# Patient Record
Sex: FEMALE | Race: BLACK OR AFRICAN AMERICAN | NOT HISPANIC OR LATINO | URBAN - METROPOLITAN AREA
[De-identification: names, ages, dates, MRNs, and addresses within clinical notes are randomized per-mention and may not be internally consistent; named-entity substitution may affect disease eponyms.]

---

## 2018-03-30 VITALS
HEIGHT: 63 IN | OXYGEN SATURATION: 100 % | DIASTOLIC BLOOD PRESSURE: 63 MMHG | SYSTOLIC BLOOD PRESSURE: 133 MMHG | TEMPERATURE: 97 F | HEART RATE: 56 BPM | RESPIRATION RATE: 16 BRPM | WEIGHT: 169.98 LBS

## 2018-03-30 RX ORDER — CHLORHEXIDINE GLUCONATE 213 G/1000ML
1 SOLUTION TOPICAL ONCE
Qty: 0 | Refills: 0 | Status: COMPLETED | OUTPATIENT
Start: 2018-04-03 | End: 2018-04-04

## 2018-03-30 NOTE — H&P ADULT - FAMILY HISTORY
Father  Still living? Unknown  Family history of coronary artery disease, Age at diagnosis: Age Unknown     Mother  Still living? Unknown  Family history of coronary artery disease, Age at diagnosis: Age Unknown     Sibling  Still living? Unknown  Family history of coronary artery disease, Age at diagnosis: Age Unknown     Grandparent  Still living? Unknown  Family history of coronary artery disease, Age at diagnosis: Age Unknown

## 2018-03-30 NOTE — H&P ADULT - PSH
History of percutaneous coronary intervention H/O shoulder surgery    History of percutaneous coronary intervention

## 2018-03-30 NOTE — H&P ADULT - NSHPLABSRESULTS_GEN_ALL_CORE
12.2   4.8   )-----------( 264      ( 03 Apr 2018 12:00 )             37.4   PT/INR - ( 03 Apr 2018 12:00 )   PT: 13.2 sec;   INR: 1.19          PTT - ( 03 Apr 2018 12:00 )  PTT:33.6 sec    EKG: Sinus bradycardia @ 55 BPM with Q waves in III, no ST-T wave abnormalities noted 12.2   4.8   )-----------( 264      ( 03 Apr 2018 12:00 )             37.4   PT/INR - ( 03 Apr 2018 12:00 )   PT: 13.2 sec;   INR: 1.19          PTT - ( 03 Apr 2018 12:00 )  PTT:33.6 sec    04-03    139  |  102  |  15  ----------------------------<  102<H>  4.2   |  26  |  0.89    Ca    9.8      03 Apr 2018 12:00        EKG: Sinus bradycardia @ 55 BPM with Q waves in III, no ST-T wave abnormalities noted

## 2018-03-30 NOTE — H&P ADULT - ASSESSMENT
Patient is a 66 y/o female strong FHX of CAD w/ PMHx of HTN, Hyperlipidemia, known CAD s/p PCI CHENG mLAD (99%) 7/16/13 at [facility] who presents for cardiac catheterization w/ possible intervention if clinically indicated to r/o progression of CAD in light of pts risk factors, known CAD, CCS III anginal symptoms, and abnormal stress test.    ASA: III   Mallampati: IV    Precath/Consented  IVF NS @ 75 cc/hr  Pt pre-loaded at home with ASA 81 mg PO x 1 and Plavix 75 mg PO x 1 this AM. Pt reports strict compliance with ASA/Plavix regiment.    Risks & benefits of procedure and alternative therapy have been explained to the patient including but not limited to: allergic reaction, bleeding w/possible need for blood transfusion, infection, renal and vascular compromise, limb damage, arrhythmia, stroke, vessel dissection/perforation, Myocardial infarction, emergent CABG. Informed consent obtained and in chart

## 2018-04-03 ENCOUNTER — INPATIENT (INPATIENT)
Facility: HOSPITAL | Age: 68
LOS: 8 days | Discharge: HOME HEALTH | DRG: 234 | End: 2018-04-12
Attending: THORACIC SURGERY (CARDIOTHORACIC VASCULAR SURGERY) | Admitting: THORACIC SURGERY (CARDIOTHORACIC VASCULAR SURGERY)
Payer: COMMERCIAL

## 2018-04-03 DIAGNOSIS — Z98.890 OTHER SPECIFIED POSTPROCEDURAL STATES: Chronic | ICD-10-CM

## 2018-04-03 DIAGNOSIS — Z29.9 ENCOUNTER FOR PROPHYLACTIC MEASURES, UNSPECIFIED: ICD-10-CM

## 2018-04-03 DIAGNOSIS — I20.9 ANGINA PECTORIS, UNSPECIFIED: ICD-10-CM

## 2018-04-03 DIAGNOSIS — R63.8 OTHER SYMPTOMS AND SIGNS CONCERNING FOOD AND FLUID INTAKE: ICD-10-CM

## 2018-04-03 DIAGNOSIS — E78.5 HYPERLIPIDEMIA, UNSPECIFIED: ICD-10-CM

## 2018-04-03 DIAGNOSIS — R73.03 PREDIABETES: ICD-10-CM

## 2018-04-03 DIAGNOSIS — I25.10 ATHEROSCLEROTIC HEART DISEASE OF NATIVE CORONARY ARTERY WITHOUT ANGINA PECTORIS: ICD-10-CM

## 2018-04-03 DIAGNOSIS — I10 ESSENTIAL (PRIMARY) HYPERTENSION: ICD-10-CM

## 2018-04-03 LAB
ALBUMIN SERPL ELPH-MCNC: 4.3 G/DL — SIGNIFICANT CHANGE UP (ref 3.3–5)
ALBUMIN SERPL ELPH-MCNC: 4.5 G/DL — SIGNIFICANT CHANGE UP (ref 3.3–5)
ALP SERPL-CCNC: 61 U/L — SIGNIFICANT CHANGE UP (ref 40–120)
ALP SERPL-CCNC: 69 U/L — SIGNIFICANT CHANGE UP (ref 40–120)
ALT FLD-CCNC: 14 U/L — SIGNIFICANT CHANGE UP (ref 10–45)
ALT FLD-CCNC: 16 U/L — SIGNIFICANT CHANGE UP (ref 10–45)
ANION GAP SERPL CALC-SCNC: 11 MMOL/L — SIGNIFICANT CHANGE UP (ref 5–17)
ANION GAP SERPL CALC-SCNC: 12 MMOL/L — SIGNIFICANT CHANGE UP (ref 5–17)
APPEARANCE UR: CLEAR — SIGNIFICANT CHANGE UP
APTT BLD: 33.6 SEC — SIGNIFICANT CHANGE UP (ref 27.5–37.4)
APTT BLD: 44.3 SEC — HIGH (ref 27.5–37.4)
AST SERPL-CCNC: 38 U/L — SIGNIFICANT CHANGE UP (ref 10–40)
AST SERPL-CCNC: 43 U/L — HIGH (ref 10–40)
BASOPHILS NFR BLD AUTO: 0.6 % — SIGNIFICANT CHANGE UP (ref 0–2)
BILIRUB DIRECT SERPL-MCNC: <0.2 MG/DL — SIGNIFICANT CHANGE UP (ref 0–0.2)
BILIRUB INDIRECT FLD-MCNC: >0.1 MG/DL — LOW (ref 0.2–1)
BILIRUB SERPL-MCNC: 0.3 MG/DL — SIGNIFICANT CHANGE UP (ref 0.2–1.2)
BILIRUB SERPL-MCNC: 0.3 MG/DL — SIGNIFICANT CHANGE UP (ref 0.2–1.2)
BILIRUB UR-MCNC: NEGATIVE — SIGNIFICANT CHANGE UP
BUN SERPL-MCNC: 13 MG/DL — SIGNIFICANT CHANGE UP (ref 7–23)
BUN SERPL-MCNC: 15 MG/DL — SIGNIFICANT CHANGE UP (ref 7–23)
CALCIUM SERPL-MCNC: 9.4 MG/DL — SIGNIFICANT CHANGE UP (ref 8.4–10.5)
CALCIUM SERPL-MCNC: 9.8 MG/DL — SIGNIFICANT CHANGE UP (ref 8.4–10.5)
CHLORIDE SERPL-SCNC: 100 MMOL/L — SIGNIFICANT CHANGE UP (ref 96–108)
CHLORIDE SERPL-SCNC: 102 MMOL/L — SIGNIFICANT CHANGE UP (ref 96–108)
CHOLEST SERPL-MCNC: 199 MG/DL — SIGNIFICANT CHANGE UP (ref 10–199)
CK MB CFR SERPL CALC: 5.6 NG/ML — SIGNIFICANT CHANGE UP (ref 0–6.7)
CK SERPL-CCNC: 345 U/L — HIGH (ref 25–170)
CO2 SERPL-SCNC: 23 MMOL/L — SIGNIFICANT CHANGE UP (ref 22–31)
CO2 SERPL-SCNC: 26 MMOL/L — SIGNIFICANT CHANGE UP (ref 22–31)
COLOR SPEC: YELLOW — SIGNIFICANT CHANGE UP
CREAT SERPL-MCNC: 0.71 MG/DL — SIGNIFICANT CHANGE UP (ref 0.5–1.3)
CREAT SERPL-MCNC: 0.89 MG/DL — SIGNIFICANT CHANGE UP (ref 0.5–1.3)
CRP SERPL-MCNC: 0.76 MG/DL — HIGH (ref 0–0.4)
DIFF PNL FLD: NEGATIVE — SIGNIFICANT CHANGE UP
EOSINOPHIL NFR BLD AUTO: 7.3 % — HIGH (ref 0–6)
GLUCOSE SERPL-MCNC: 102 MG/DL — HIGH (ref 70–99)
GLUCOSE SERPL-MCNC: 90 MG/DL — SIGNIFICANT CHANGE UP (ref 70–99)
GLUCOSE UR QL: NEGATIVE — SIGNIFICANT CHANGE UP
HBA1C BLD-MCNC: 5.9 % — HIGH (ref 4–5.6)
HCT VFR BLD CALC: 37.4 % — SIGNIFICANT CHANGE UP (ref 34.5–45)
HCT VFR BLD CALC: 38.3 % — SIGNIFICANT CHANGE UP (ref 34.5–45)
HDLC SERPL-MCNC: 62 MG/DL — SIGNIFICANT CHANGE UP (ref 40–125)
HGB BLD-MCNC: 12.2 G/DL — SIGNIFICANT CHANGE UP (ref 11.5–15.5)
HGB BLD-MCNC: 12.4 G/DL — SIGNIFICANT CHANGE UP (ref 11.5–15.5)
INR BLD: 1.19 — HIGH (ref 0.88–1.16)
INR BLD: 1.25 — HIGH (ref 0.88–1.16)
KETONES UR-MCNC: NEGATIVE — SIGNIFICANT CHANGE UP
LEUKOCYTE ESTERASE UR-ACNC: NEGATIVE — SIGNIFICANT CHANGE UP
LIPID PNL WITH DIRECT LDL SERPL: 122 MG/DL — SIGNIFICANT CHANGE UP
LYMPHOCYTES # BLD AUTO: 49.4 % — HIGH (ref 13–44)
MAGNESIUM SERPL-MCNC: 2 MG/DL — SIGNIFICANT CHANGE UP (ref 1.6–2.6)
MCHC RBC-ENTMCNC: 26.6 PG — LOW (ref 27–34)
MCHC RBC-ENTMCNC: 27 PG — SIGNIFICANT CHANGE UP (ref 27–34)
MCHC RBC-ENTMCNC: 32.4 G/DL — SIGNIFICANT CHANGE UP (ref 32–36)
MCHC RBC-ENTMCNC: 32.6 G/DL — SIGNIFICANT CHANGE UP (ref 32–36)
MCV RBC AUTO: 82 FL — SIGNIFICANT CHANGE UP (ref 80–100)
MCV RBC AUTO: 82.7 FL — SIGNIFICANT CHANGE UP (ref 80–100)
MONOCYTES NFR BLD AUTO: 7.5 % — SIGNIFICANT CHANGE UP (ref 2–14)
NEUTROPHILS NFR BLD AUTO: 35.2 % — LOW (ref 43–77)
NITRITE UR-MCNC: NEGATIVE — SIGNIFICANT CHANGE UP
NT-PROBNP SERPL-SCNC: 62 PG/ML — SIGNIFICANT CHANGE UP (ref 0–300)
PH UR: 7 — SIGNIFICANT CHANGE UP (ref 5–8)
PLATELET # BLD AUTO: 264 K/UL — SIGNIFICANT CHANGE UP (ref 150–400)
PLATELET # BLD AUTO: 279 K/UL — SIGNIFICANT CHANGE UP (ref 150–400)
POTASSIUM SERPL-MCNC: 4.2 MMOL/L — SIGNIFICANT CHANGE UP (ref 3.5–5.3)
POTASSIUM SERPL-MCNC: SIGNIFICANT CHANGE UP MMOL/L (ref 3.5–5.3)
POTASSIUM SERPL-SCNC: 4.2 MMOL/L — SIGNIFICANT CHANGE UP (ref 3.5–5.3)
POTASSIUM SERPL-SCNC: SIGNIFICANT CHANGE UP MMOL/L (ref 3.5–5.3)
PROT SERPL-MCNC: 7.9 G/DL — SIGNIFICANT CHANGE UP (ref 6–8.3)
PROT SERPL-MCNC: 8 G/DL — SIGNIFICANT CHANGE UP (ref 6–8.3)
PROT UR-MCNC: NEGATIVE MG/DL — SIGNIFICANT CHANGE UP
PROTHROM AB SERPL-ACNC: 13.2 SEC — HIGH (ref 9.8–12.7)
PROTHROM AB SERPL-ACNC: 13.9 SEC — HIGH (ref 9.8–12.7)
RBC # BLD: 4.52 M/UL — SIGNIFICANT CHANGE UP (ref 3.8–5.2)
RBC # BLD: 4.67 M/UL — SIGNIFICANT CHANGE UP (ref 3.8–5.2)
RBC # FLD: 13.4 % — SIGNIFICANT CHANGE UP (ref 10.3–16.9)
RBC # FLD: 13.4 % — SIGNIFICANT CHANGE UP (ref 10.3–16.9)
SODIUM SERPL-SCNC: 135 MMOL/L — SIGNIFICANT CHANGE UP (ref 135–145)
SODIUM SERPL-SCNC: 139 MMOL/L — SIGNIFICANT CHANGE UP (ref 135–145)
SP GR SPEC: 1.01 — SIGNIFICANT CHANGE UP (ref 1–1.03)
TOTAL CHOLESTEROL/HDL RATIO MEASUREMENT: 3.2 RATIO — LOW (ref 3.3–7.1)
TRIGL SERPL-MCNC: 77 MG/DL — SIGNIFICANT CHANGE UP (ref 10–149)
TROPONIN T SERPL-MCNC: <0.01 NG/ML — SIGNIFICANT CHANGE UP (ref 0–0.01)
TSH SERPL-MCNC: 0.9 UIU/ML — SIGNIFICANT CHANGE UP (ref 0.35–4.94)
UROBILINOGEN FLD QL: 0.2 E.U./DL — SIGNIFICANT CHANGE UP
WBC # BLD: 3.9 K/UL — SIGNIFICANT CHANGE UP (ref 3.8–10.5)
WBC # BLD: 4.8 K/UL — SIGNIFICANT CHANGE UP (ref 3.8–10.5)
WBC # FLD AUTO: 3.9 K/UL — SIGNIFICANT CHANGE UP (ref 3.8–10.5)
WBC # FLD AUTO: 4.8 K/UL — SIGNIFICANT CHANGE UP (ref 3.8–10.5)

## 2018-04-03 PROCEDURE — 93306 TTE W/DOPPLER COMPLETE: CPT | Mod: 26

## 2018-04-03 PROCEDURE — 93571 IV DOP VEL&/PRESS C FLO 1ST: CPT | Mod: 26,LM

## 2018-04-03 PROCEDURE — 93010 ELECTROCARDIOGRAM REPORT: CPT

## 2018-04-03 PROCEDURE — 71045 X-RAY EXAM CHEST 1 VIEW: CPT | Mod: 26

## 2018-04-03 PROCEDURE — 99223 1ST HOSP IP/OBS HIGH 75: CPT

## 2018-04-03 PROCEDURE — 93458 L HRT ARTERY/VENTRICLE ANGIO: CPT | Mod: 26

## 2018-04-03 RX ORDER — NITROGLYCERIN 6.5 MG
10 CAPSULE, EXTENDED RELEASE ORAL
Qty: 50 | Refills: 0 | Status: DISCONTINUED | OUTPATIENT
Start: 2018-04-03 | End: 2018-04-03

## 2018-04-03 RX ORDER — ASPIRIN/CALCIUM CARB/MAGNESIUM 324 MG
81 TABLET ORAL DAILY
Qty: 0 | Refills: 0 | Status: DISCONTINUED | OUTPATIENT
Start: 2018-04-03 | End: 2018-04-05

## 2018-04-03 RX ORDER — METOPROLOL TARTRATE 50 MG
50 TABLET ORAL DAILY
Qty: 0 | Refills: 0 | Status: DISCONTINUED | OUTPATIENT
Start: 2018-04-03 | End: 2018-04-05

## 2018-04-03 RX ORDER — CLOPIDOGREL BISULFATE 75 MG/1
75 TABLET, FILM COATED ORAL DAILY
Qty: 0 | Refills: 0 | Status: DISCONTINUED | OUTPATIENT
Start: 2018-04-03 | End: 2018-04-03

## 2018-04-03 RX ORDER — ATORVASTATIN CALCIUM 80 MG/1
80 TABLET, FILM COATED ORAL AT BEDTIME
Qty: 0 | Refills: 0 | Status: DISCONTINUED | OUTPATIENT
Start: 2018-04-03 | End: 2018-04-05

## 2018-04-03 RX ORDER — SODIUM CHLORIDE 9 MG/ML
500 INJECTION INTRAMUSCULAR; INTRAVENOUS; SUBCUTANEOUS
Qty: 0 | Refills: 0 | Status: DISCONTINUED | OUTPATIENT
Start: 2018-04-03 | End: 2018-04-03

## 2018-04-03 RX ORDER — AMLODIPINE BESYLATE 2.5 MG/1
2.5 TABLET ORAL DAILY
Qty: 0 | Refills: 0 | Status: DISCONTINUED | OUTPATIENT
Start: 2018-04-03 | End: 2018-04-03

## 2018-04-03 RX ORDER — MORPHINE SULFATE 50 MG/1
2 CAPSULE, EXTENDED RELEASE ORAL ONCE
Qty: 0 | Refills: 0 | Status: DISCONTINUED | OUTPATIENT
Start: 2018-04-03 | End: 2018-04-03

## 2018-04-03 RX ORDER — HEPARIN SODIUM 5000 [USP'U]/ML
950 INJECTION INTRAVENOUS; SUBCUTANEOUS
Qty: 25000 | Refills: 0 | Status: DISCONTINUED | OUTPATIENT
Start: 2018-04-03 | End: 2018-04-04

## 2018-04-03 RX ORDER — ISOSORBIDE MONONITRATE 60 MG/1
120 TABLET, EXTENDED RELEASE ORAL DAILY
Qty: 0 | Refills: 0 | Status: DISCONTINUED | OUTPATIENT
Start: 2018-04-03 | End: 2018-04-05

## 2018-04-03 RX ORDER — LOSARTAN POTASSIUM 100 MG/1
50 TABLET, FILM COATED ORAL DAILY
Qty: 0 | Refills: 0 | Status: DISCONTINUED | OUTPATIENT
Start: 2018-04-03 | End: 2018-04-03

## 2018-04-03 RX ORDER — NITROGLYCERIN 6.5 MG
0.4 CAPSULE, EXTENDED RELEASE ORAL ONCE
Qty: 0 | Refills: 0 | Status: DISCONTINUED | OUTPATIENT
Start: 2018-04-03 | End: 2018-04-03

## 2018-04-03 RX ORDER — RANOLAZINE 500 MG/1
500 TABLET, FILM COATED, EXTENDED RELEASE ORAL
Qty: 0 | Refills: 0 | Status: DISCONTINUED | OUTPATIENT
Start: 2018-04-03 | End: 2018-04-05

## 2018-04-03 RX ORDER — FAMOTIDINE 10 MG/ML
40 INJECTION INTRAVENOUS ONCE
Qty: 0 | Refills: 0 | Status: COMPLETED | OUTPATIENT
Start: 2018-04-03 | End: 2018-04-03

## 2018-04-03 RX ADMIN — ATORVASTATIN CALCIUM 80 MILLIGRAM(S): 80 TABLET, FILM COATED ORAL at 22:22

## 2018-04-03 RX ADMIN — Medication 30 MILLILITER(S): at 18:09

## 2018-04-03 RX ADMIN — FAMOTIDINE 40 MILLIGRAM(S): 10 INJECTION INTRAVENOUS at 18:09

## 2018-04-03 RX ADMIN — SODIUM CHLORIDE 75 MILLILITER(S): 9 INJECTION INTRAMUSCULAR; INTRAVENOUS; SUBCUTANEOUS at 12:22

## 2018-04-03 RX ADMIN — MORPHINE SULFATE 2 MILLIGRAM(S): 50 CAPSULE, EXTENDED RELEASE ORAL at 18:31

## 2018-04-03 RX ADMIN — RANOLAZINE 500 MILLIGRAM(S): 500 TABLET, FILM COATED, EXTENDED RELEASE ORAL at 18:09

## 2018-04-03 RX ADMIN — HEPARIN SODIUM 9.5 UNIT(S)/HR: 5000 INJECTION INTRAVENOUS; SUBCUTANEOUS at 22:23

## 2018-04-03 RX ADMIN — MORPHINE SULFATE 2 MILLIGRAM(S): 50 CAPSULE, EXTENDED RELEASE ORAL at 18:09

## 2018-04-03 NOTE — PROGRESS NOTE ADULT - PROBLEM SELECTOR PLAN 1
Patient with h/o CAD s/p CHENG to mLAD in 2013 now with one year history of progressively worsening exertional angina, who is now found on cardiac catheterization.  Findings were LM 70%, mLAD 50%, oLCx 70%, RCA 50% EF 60%.  -- Dr. Murrieta of CT surgery was consulted for possible surgical intervention and hybrid procedure.  Troponins negative, EKG without ischemic changes.    -- Holding home Plavix in setting of possible intervention  -- Heparin gtt for AC  -- C/w metoprolol succinate 50 mg PO daily  -- Initiate atorvastatin 80 mg PO daily  -- Hold home losartan 50 mg PO daily in setting of cath and planned procedure

## 2018-04-03 NOTE — PROGRESS NOTE ADULT - PROBLEM SELECTOR PLAN 2
Patient experiencing chest pain likely anginal in nature.    -- C/w morphine 2 mg IV PRN for chest pain  -- Management for CAD as above

## 2018-04-03 NOTE — PROGRESS NOTE ADULT - ASSESSMENT
Ms. Snell is a 67 year-old female with a PMH of CAD (s/p CHENG to mLAD in 2013), HTN, HLD, and FH significant for CAD, who presented to her cardiologist for exertional chest pain, underwent cardiac catheterization and was found to have multi-vessel disease, planned for possible intervention, being monitored in CCU.

## 2018-04-03 NOTE — PROGRESS NOTE ADULT - PROBLEM SELECTOR PLAN 7
#DVT PPX - on heparin gtt  #Code - full  #Dispo - CCU for monitoring after cath and in advance of planned procedure

## 2018-04-03 NOTE — PROGRESS NOTE ADULT - SUBJECTIVE AND OBJECTIVE BOX
Event Note:    Pt endorsing 4/10 chest burning post cath. EKG nonischemic and unchanged from prior. Dr. Cortes recommended Nitro drip titrated to pt's comfort. Nitro drip at 3ml/hr initiated and pt's SBP went from 150s to 110s and Nitro drip discontinued. Pt stated chest pain is around 2/10 and pt is resting comfortably with no associated symptoms.

## 2018-04-03 NOTE — CONSULT NOTE ADULT - SUBJECTIVE AND OBJECTIVE BOX
Surgeon: Dr. Murrieta     Requesting Physician: Dr. Cortes     HISTORY OF PRESENT ILLNESS:  67 year old female, with strong FHx CAD and PMHx HTN, HLD, known CAD s/p PCI CHENG mLAD 13, possible hx of TIA presented to her cardiologist Dr. Cortes complaining of exertional chest pain after ambulating less than 1/2 block. Parient decscribes the pain as burning and pressure radiating to her bilateral arms and left jaw. Patient states her pain does not resolve with rest and mostly occurs when she ambulates 3-4 stairs. Also states that she experiences palpitations and shortness of breath with the chest pain but denies any diaphoresis, nausea or vomiting. She also complains of increasing LE edema and 2 pillow orthopnea that has been progressively getting worse. Patient subsequently underwent abnormal NST and was referred for elective cardiac cath today. Cardiac cath revealed LM 70%, mLAD 50%, oLCx 70%, RCA 50% EF 60%. CT Surgery Dr. Murrieta consulted for possible surgical intervention and hybrid procedure.     PAST MEDICAL & SURGICAL HISTORY:  Coronary artery disease  Hyperlipidemia  Hypertension  H/O shoulder surgery  History of percutaneous coronary intervention      MEDICATIONS  (STANDING):  amLODIPine   Tablet 2.5 milliGRAM(s) Oral daily  aspirin enteric coated 81 milliGRAM(s) Oral daily  chlorhexidine 4% Liquid 1 Application(s) Topical once  clopidogrel Tablet 75 milliGRAM(s) Oral daily  isosorbide   mononitrate ER Tablet (IMDUR) 120 milliGRAM(s) Oral daily  losartan 50 milliGRAM(s) Oral daily  metoprolol succinate ER 50 milliGRAM(s) Oral daily  nitroglycerin  Infusion 10 MICROgram(s)/Min (3 mL/Hr) IV Continuous <Continuous>  ranolazine 500 milliGRAM(s) Oral two times a day  sodium chloride 0.9%. 500 milliLiter(s) (75 mL/Hr) IV Continuous <Continuous>    MEDICATIONS  (PRN):      Allergies    No Known Allergies    Intolerances        SOCIAL HISTORY:  Smoker: No  ETOH use: No  Ilicit Drug use: No  Occupation: OT assistant   Assisted device use (Cane / Walker): None     FAMILY HISTORY:  Family history of coronary artery disease (Father, Mother, Sibling, Grandparent)    Review of Systems  CONSTITUTIONAL:  Denies Fevers / chills, sweats, fatigue, weight loss, weight gain                                      NEURO:  Denies parathesias, seizures, syncope, confusion                                                                                EYES:  Denies Blurry vision, discharge, pain, loss of vision                                                                                    ENMT:  Denies Difficulty hearing, vertigo, dysphagia, epistaxis, recent dental work                                       CV:  Denies Chest pain, palpitations, SANTOS, orthopnea                                                                                          RESPIRATORY:  Denies Wheezing, SOB, cough / sputum, hemoptysis                                                                GI:  Denies Nausea, vommiting, diarrhea, constipation, melena, difficulty swallowing                                               : Denies Hematuria, dysuria, urgency, incontinence                                                                                         MUSKULOSKELETAL:  Denies arthritis, joint swelling, muscle weakness                                                             SKIN/BREAST:  Denies rash, itching, kristie loss, masses                                                                                            PSYCH:  Denies depresion, anxiety, suicidal ideation                                                                                               HEME/LYMPH:  Denies bruises easily, enlarged lymph nodes, tender lymph nodes                                        ENDOCRINE:  Denies cold intolerance, heat intolerance, polydipsia       PHYSICAL EXAM  Vital Signs Last 24 Hrs  T(C): --  T(F): --  HR: --  BP: --  BP(mean): --  RR: --  SpO2: --    General: Patient lying comfortably in bed, no acute distress     Neurological: Alert and oriented. No focal neurological deficits     HEENT: PERRL, no abnormal findings     Cardiovascular: S1S2, RRR, no murmurs appreciated on exam     Respiratory: Clear to ausculation bilaterally     Gastrointestinal: Abdomen soft, non tender, non distended     Extremities: Warm and well perfused. No edema or calf tenderness     Skin/Breast: WNL     Vascular: Peripheral pulses 2+ bilaterally     Incision Sites: R radial cath site C/D/I                                                           LABS:                        12.2   4.8   )-----------( 264      ( 2018 12:00 )             37.4     139  |  102  |  15  ----------------------------<  102<H>  4.2   |  26  |  0.89    Ca    9.8      2018 12:00      PT/INR - ( 2018 12:00 )   PT: 13.2 sec;   INR: 1.19          PTT - ( 2018 12:00 )  PTT:33.6 sec    CARDIAC MARKERS ( 2018 12:00 )  x     / x     / 345 U/L / x     / 5.6 ng/mL        Hemoglobin A1C, Whole Blood: 5.9 % (04-03 @ 12:00)    RADIOLOGY & ADDITIONAL STUDIES:  CAROTID U/S: Pending     CXR: Pending     CT Scan: Pending     EK-03  < from: 12 Lead ECG (18 @ 12:26) >  Sinus bradycardia  Possible Inferior infarct , age undetermined    TTE / MACY: Pending     Cardiac Cath:  LM 70%, mLAD 50%, oLCx 70%, RCA 50% EF 60%.

## 2018-04-03 NOTE — PROGRESS NOTE ADULT - PROBLEM SELECTOR PLAN 3
Patient with h/o HTN  -- holding home losartan 50 mg PO daily in setting of cath and planned procedure  -- holding home amlodipine   -- telemetry monitoring of hemodynamics, consider PRN hydralazine if patient becomes hypertensive.

## 2018-04-03 NOTE — PROGRESS NOTE ADULT - SUBJECTIVE AND OBJECTIVE BOX
CCU TRANSFER ACCEPT NOTE    HOSPITAL COURSE:    SUBJECTIVE / INTERVAL HPI: Patient seen and examined at bedside.     VITAL SIGNS:  Vital Signs Last 24 Hrs  T(C): --  T(F): --  HR: --  BP: --  BP(mean): --  RR: --  SpO2: --    PHYSICAL EXAM:    General: WDWN  HEENT: NC/AT; PERRL, anicteric sclera; MMM  Neck: supple  Cardiovascular: +S1/S2; RRR  Respiratory: CTA B/L; no W/R/R  Gastrointestinal: soft, NT/ND; +BSx4  Extremities: WWP; no edema, clubbing or cyanosis  Vascular: 2+ radial, DP/PT pulses B/L  Neurological: AAOx3; no focal deficits    MEDICATIONS:  MEDICATIONS  (STANDING):  amLODIPine   Tablet 2.5 milliGRAM(s) Oral daily  aspirin enteric coated 81 milliGRAM(s) Oral daily  chlorhexidine 4% Liquid 1 Application(s) Topical once  isosorbide   mononitrate ER Tablet (IMDUR) 120 milliGRAM(s) Oral daily  losartan 50 milliGRAM(s) Oral daily  metoprolol succinate ER 50 milliGRAM(s) Oral daily  ranolazine 500 milliGRAM(s) Oral two times a day  sodium chloride 0.9%. 500 milliLiter(s) (75 mL/Hr) IV Continuous <Continuous>    MEDICATIONS  (PRN):      ALLERGIES:  Allergies    No Known Allergies    Intolerances        LABS:                        12.2   4.8   )-----------( 264      ( 03 Apr 2018 12:00 )             37.4     04-03    139  |  102  |  15  ----------------------------<  102<H>  4.2   |  26  |  0.89    Ca    9.8      03 Apr 2018 12:00    TPro  8.0  /  Alb  4.5  /  TBili  0.3  /  DBili  <0.2  /  AST  38  /  ALT  14  /  AlkPhos  69  04-03    PT/INR - ( 03 Apr 2018 12:00 )   PT: 13.2 sec;   INR: 1.19          PTT - ( 03 Apr 2018 12:00 )  PTT:33.6 sec    CAPILLARY BLOOD GLUCOSE          RADIOLOGY & ADDITIONAL TESTS: Reviewed.    ASSESSMENT:    PLAN: CCU TRANSFER ACCEPT NOTE    HOSPITAL COURSE:  Ms. Snell is a 67 year-old female with a PMH of CAD (s/p CHENG to mLAD in 2013), HTN, HLD, and FH significant for CAD, who presented to her cardiologist, Dr. Cortes, for exertional chest pain.  She was in her usual state of health until _____, when.  She describes the pain as a tightness that radiates to her neck and ear.  When present, it is approximately an 8/10 in severity.  Associated symptoms include palptiations.  She says that rest and deep breathing make the pain better.  She says it is worsened by exertion, and can only walk one block before developing chest pain.  She denies syncope, dizziness, orthopnea, and LE edema.      Of note, the patient underwent a nuclear stress test on 12/21/2017 that demonstrated an EF of 67%, J point depression 0.5 to 1mm in inferolateral leads w/ upsloping ST segment, and normal myocardial perfusion.  However, patient experienced chest pain at peak exercise, which resolved during recovery. The stress test was terminated due to chest pain.  The patient previously underwent cardiac catheterization at [facility] on 7/16/2013 that demonstrated the following: LVEF 65%. No AS. No MR. LM: large sized with minor luminal irregularities. CSA IVUS 7.0 mm2. LAD: large sized, proximal 50% IVUS CSA 5.3 mm2, lesion extends to mid LAD with 4.0 mm2 (proximal to 99 % lesion in mid LAD). Promus CHENG mLAD 99% with residual 0% stenosis. LCx: (dominant), large sized, minor luminal irregularities. RCA: minor luminal irregularities.    In light of pts risk factors, known CAD, CCS III anginal symptoms and abnormal stress test pt is referred for cardiac catheterization w/ possible intervention if clinically indicated to r/o progression of CAD        SUBJECTIVE / INTERVAL HPI: Patient seen and examined at bedside.     VITAL SIGNS:  Vital Signs Last 24 Hrs  T(C): --  T(F): --  HR: --  BP: --  BP(mean): --  RR: --  SpO2: --    PHYSICAL EXAM:    General: WDWN  HEENT: NC/AT; PERRL, anicteric sclera; MMM  Neck: supple  Cardiovascular: +S1/S2; RRR  Respiratory: CTA B/L; no W/R/R  Gastrointestinal: soft, NT/ND; +BSx4  Extremities: WWP; no edema, clubbing or cyanosis  Vascular: 2+ radial, DP/PT pulses B/L  Neurological: AAOx3; no focal deficits    MEDICATIONS:  MEDICATIONS  (STANDING):  amLODIPine   Tablet 2.5 milliGRAM(s) Oral daily  aspirin enteric coated 81 milliGRAM(s) Oral daily  chlorhexidine 4% Liquid 1 Application(s) Topical once  isosorbide   mononitrate ER Tablet (IMDUR) 120 milliGRAM(s) Oral daily  losartan 50 milliGRAM(s) Oral daily  metoprolol succinate ER 50 milliGRAM(s) Oral daily  ranolazine 500 milliGRAM(s) Oral two times a day  sodium chloride 0.9%. 500 milliLiter(s) (75 mL/Hr) IV Continuous <Continuous>    MEDICATIONS  (PRN):      ALLERGIES:  Allergies    No Known Allergies    Intolerances        LABS:                        12.2   4.8   )-----------( 264      ( 03 Apr 2018 12:00 )             37.4     04-03    139  |  102  |  15  ----------------------------<  102<H>  4.2   |  26  |  0.89    Ca    9.8      03 Apr 2018 12:00    TPro  8.0  /  Alb  4.5  /  TBili  0.3  /  DBili  <0.2  /  AST  38  /  ALT  14  /  AlkPhos  69  04-03    PT/INR - ( 03 Apr 2018 12:00 )   PT: 13.2 sec;   INR: 1.19          PTT - ( 03 Apr 2018 12:00 )  PTT:33.6 sec    CAPILLARY BLOOD GLUCOSE          RADIOLOGY & ADDITIONAL TESTS: Reviewed.    ASSESSMENT:    PLAN: CCU TRANSFER ACCEPT NOTE    HOSPITAL COURSE:  Ms. Snell is a 67 year-old female with a PMH of CAD (s/p CHENG to mLAD in 2013), HTN, HLD, and FH significant for CAD, who presented to her cardiologist, Dr. Cortes, for exertional chest pain.  She states that the onset of her symptoms were gradual over the course of the past year.  She states that she decided to seek medical attention because her activity tolerance before developing chest pain has decreased significantly over that period of time.  She describes the pain as a tightness that radiates to her neck and ear.  When present, it is approximately an 8/10 in severity.  Associated symptoms include palpitations  She says that rest and deep breathing make the pain better.  She says it is worsened by exertion, and can only walk one block before developing chest pain.  She denies syncope, dizziness, orthopnea, and LE edema.      Of note, the patient underwent a nuclear stress test on 12/21/2017 that demonstrated an EF of 67%, J point depression 0.5 to 1mm in inferolateral leads w/ upsloping ST segment, and normal myocardial perfusion.  However, patient experienced chest pain at peak exercise, which resolved during recovery. The stress test was terminated due to chest pain.  The patient previously underwent cardiac catheterization at [facility] on 7/16/2013 that demonstrated the following: LVEF 65%. No AS. No MR. LM: large sized with minor luminal irregularities. CSA IVUS 7.0 mm2. LAD: large sized, proximal 50% IVUS CSA 5.3 mm2, lesion extends to mid LAD with 4.0 mm2 (proximal to 99 % lesion in mid LAD). Promus CHENG mLAD 99% with residual 0% stenosis. LCx: (dominant), large sized, minor luminal irregularities. RCA: minor luminal irregularities.    In light of pts risk factors, known CAD, CCS III anginal symptoms and abnormal stress test pt is referred for cardiac catheterization w/ possible intervention if clinically indicated to r/o progression of CAD        SUBJECTIVE / INTERVAL HPI: Patient seen and examined at bedside.     Vital Signs Last 24 Hrs  T(C): 36.2 (03 Apr 2018 17:18), Max: 36.2 (03 Apr 2018 17:18)  T(F): 97.2 (03 Apr 2018 17:18), Max: 97.2 (03 Apr 2018 17:18)  HR: 60 (03 Apr 2018 17:30) (58 - 60)  BP: 138/74 (03 Apr 2018 17:30) (138/61 - 140/65)  BP(mean): 115 (03 Apr 2018 17:30) (8 - 115)  RR: 17 (03 Apr 2018 17:30) (14 - 17)  SpO2: 100% (03 Apr 2018 17:30) (99% - 100%)    PHYSICAL EXAM:    General: WDWN  HEENT: NC/AT; PERRL, anicteric sclera; MMM  Neck: supple  Cardiovascular: +S1/S2; RRR  Respiratory: CTA B/L; no W/R/R  Gastrointestinal: soft, NT/ND; +BSx4  Extremities: WWP; no edema, clubbing or cyanosis  Vascular: 2+ radial, DP/PT pulses B/L  Neurological: AAOx3; no focal deficits    MEDICATIONS:  MEDICATIONS  (STANDING):  amLODIPine   Tablet 2.5 milliGRAM(s) Oral daily  aspirin enteric coated 81 milliGRAM(s) Oral daily  chlorhexidine 4% Liquid 1 Application(s) Topical once  isosorbide   mononitrate ER Tablet (IMDUR) 120 milliGRAM(s) Oral daily  losartan 50 milliGRAM(s) Oral daily  metoprolol succinate ER 50 milliGRAM(s) Oral daily  ranolazine 500 milliGRAM(s) Oral two times a day  sodium chloride 0.9%. 500 milliLiter(s) (75 mL/Hr) IV Continuous <Continuous>    MEDICATIONS  (PRN):      ALLERGIES:  Allergies    No Known Allergies    Intolerances        LABS:                        12.2   4.8   )-----------( 264      ( 03 Apr 2018 12:00 )             37.4     04-03    139  |  102  |  15  ----------------------------<  102<H>  4.2   |  26  |  0.89    Ca    9.8      03 Apr 2018 12:00    TPro  8.0  /  Alb  4.5  /  TBili  0.3  /  DBili  <0.2  /  AST  38  /  ALT  14  /  AlkPhos  69  04-03    PT/INR - ( 03 Apr 2018 12:00 )   PT: 13.2 sec;   INR: 1.19          PTT - ( 03 Apr 2018 12:00 )  PTT:33.6 sec    CAPILLARY BLOOD GLUCOSE          RADIOLOGY & ADDITIONAL TESTS: Reviewed.    ASSESSMENT:    PLAN: CCU TRANSFER ACCEPT NOTE    HOSPITAL COURSE:  Ms. Snell is a 67 year-old female with a PMH of CAD (s/p CHENG to mLAD in 2013), HTN, HLD, and FH significant for CAD, who presented to her cardiologist, Dr. Cortes, for exertional chest pain.  She states that the onset of her symptoms were gradual over the course of the past year.  She states that she decided to seek medical attention because her activity tolerance before developing chest pain has decreased significantly over that period of time.  She describes the pain as a tightness that radiates to her neck and ear.  When present, it is approximately an 8/10 in severity.  Associated symptoms include palpitations  She says that rest and deep breathing make the pain better.  She says it is worsened by exertion, and can only walk one block before developing chest pain.  She denies syncope, dizziness, orthopnea, and LE edema.      Of note, the patient underwent a nuclear stress test on 12/21/2017 that demonstrated an EF of 67%, J point depression 0.5 to 1mm in inferolateral leads w/ upsloping ST segment, and normal myocardial perfusion.  However, patient experienced chest pain at peak exercise, which resolved during recovery. The stress test was terminated due to chest pain.  The patient previously underwent cardiac catheterization at [facility] on 7/16/2013 that demonstrated the following: LVEF 65%. No AS. No MR. LM: large sized with minor luminal irregularities. CSA IVUS 7.0 mm2. LAD: large sized, proximal 50% IVUS CSA 5.3 mm2, lesion extends to mid LAD with 4.0 mm2 (proximal to 99 % lesion in mid LAD). Promus CHENG mLAD 99% with residual 0% stenosis. LCx: (dominant), large sized, minor luminal irregularities. RCA: minor luminal irregularities.    Because of the patient's risk factors, known CAD, angina and abnormal stress test, she was referred for cardiac catheterization.  Cardiac cath revealed LM 70%, mLAD 50%, oLCx 70%, RCA 50% EF 60%.  Dr. Murrieta of CT surgery was consulted for possible surgical intervention and hybrid procedure.      SUBJECTIVE / INTERVAL HPI: Patient seen and examined at bedside upon arrival in CCU.  She reports that she is doing well after her procedure.  She does admit to mild chest pain, 2/10, central and nonradiating, consistent with her known prior episodes of chest pain.  No shortness of breath or distress.  She currently denies palpitations, but admits on ROS that she intermittently does get palpitations, intermittent dizziness and leg pain while walking.  She endorses LE edema worse at the end of the day.  Denies nausea, vomiting, diaphoresis fever, chills, diarrhea.    Vital Signs Last 24 Hrs  T(C): 36.2 (03 Apr 2018 17:18), Max: 36.2 (03 Apr 2018 17:18)  T(F): 97.2 (03 Apr 2018 17:18), Max: 97.2 (03 Apr 2018 17:18)  HR: 60 (03 Apr 2018 17:30) (58 - 60)  BP: 138/74 (03 Apr 2018 17:30) (138/61 - 140/65)  BP(mean): 115 (03 Apr 2018 17:30) (8 - 115)  RR: 17 (03 Apr 2018 17:30) (14 - 17)  SpO2: 100% (03 Apr 2018 17:30) (99% - 100%)    PHYSICAL EXAM:    General: well-developed, well-nourished, comfortable, no acute distress  HEENT: normocephalic, atraumatic, anicteric sclera; no oropharyngeal erythema or exudates, moist mucous membranes  Neck: supple, no cervical or supraclavicular lymphadenopathy, no JVD appreciated  Cardiovascular: regular rate and rhythm, no murmurs, gallops, or rubs  Respiratory: normal air movement, good inspiratory effort, lungs clear to auscultation bilaterally, no wheezes, crackles, or rhonchi  Gastrointestinal: +bowel sounds, soft, mild lower quadrant tenderness b/l, nondistended, no organomegaly  Extremities: warm, well-perfused, 2+ radial pulses.  R radial access site mildly tender, but with good distal pulses and sensation intact.  There is b/l 1+ LE pitting edema to mid-shin  Neurological: A&O x3, moving all four extremities spontaneously    MEDICATIONS:  MEDICATIONS  (STANDING):  amLODIPine   Tablet 2.5 milliGRAM(s) Oral daily  aspirin enteric coated 81 milliGRAM(s) Oral daily  chlorhexidine 4% Liquid 1 Application(s) Topical once  isosorbide   mononitrate ER Tablet (IMDUR) 120 milliGRAM(s) Oral daily  losartan 50 milliGRAM(s) Oral daily  metoprolol succinate ER 50 milliGRAM(s) Oral daily  ranolazine 500 milliGRAM(s) Oral two times a day  sodium chloride 0.9%. 500 milliLiter(s) (75 mL/Hr) IV Continuous <Continuous>    MEDICATIONS  (PRN):      ALLERGIES:  Allergies    No Known Allergies    Intolerances        LABS:                        12.2   4.8   )-----------( 264      ( 03 Apr 2018 12:00 )             37.4     04-03    139  |  102  |  15  ----------------------------<  102<H>  4.2   |  26  |  0.89    Ca    9.8      03 Apr 2018 12:00    TPro  8.0  /  Alb  4.5  /  TBili  0.3  /  DBili  <0.2  /  AST  38  /  ALT  14  /  AlkPhos  69  04-03    PT/INR - ( 03 Apr 2018 12:00 )   PT: 13.2 sec;   INR: 1.19          PTT - ( 03 Apr 2018 12:00 )  PTT:33.6 sec    CAPILLARY BLOOD GLUCOSE      < from: Echocardiogram (04.03.18 @ 16:57) >  EXAM:  ECHOCARDIOGRAM (CARDIOL)                          PROCEDURE DATE:  04/03/2018      INTERPRETATION:  Patient Height: 160.0 cm  Patient Weight: 77.1 kg  Heart Rate: 61 bpm  Systolic Pressure: 155 mmHg  Diastolic Pressure: 57 mmHg  BSA: 1.8 m^2  Interpretation Summary  There is borderline concentric left ventricular hypertrophy.The left   ventricular wall motion is normal.The left ventricular ejection fraction   is   estimated to be 65-70%The left atrial size is normal.Right atrial size is   normal.The right ventricle is normal in size and function.Structurally   normal   aortic valve.No aortic regurgitation noted.Structurally normal mitral   valve.There is trace mitral regurgitation.Structurally normal tricuspid   valve.There is trace tricuspid regurgitation.There is no   echocardiographic   evidence for pulmonary hypertension.Structurally normal pulmonic valve.No   aortic root dilatation.There is no pericardial effusion.    < end of copied text >

## 2018-04-04 PROBLEM — E78.5 HYPERLIPIDEMIA, UNSPECIFIED: Chronic | Status: ACTIVE | Noted: 2018-03-30

## 2018-04-04 PROBLEM — I10 ESSENTIAL (PRIMARY) HYPERTENSION: Chronic | Status: ACTIVE | Noted: 2018-03-30

## 2018-04-04 PROBLEM — I25.10 ATHEROSCLEROTIC HEART DISEASE OF NATIVE CORONARY ARTERY WITHOUT ANGINA PECTORIS: Chronic | Status: ACTIVE | Noted: 2018-03-30

## 2018-04-04 LAB
ALBUMIN SERPL ELPH-MCNC: 3.9 G/DL — SIGNIFICANT CHANGE UP (ref 3.3–5)
ALP SERPL-CCNC: 62 U/L — SIGNIFICANT CHANGE UP (ref 40–120)
ALT FLD-CCNC: 12 U/L — SIGNIFICANT CHANGE UP (ref 10–45)
ANION GAP SERPL CALC-SCNC: 10 MMOL/L — SIGNIFICANT CHANGE UP (ref 5–17)
APTT BLD: 73.3 SEC — HIGH (ref 27.5–37.4)
APTT BLD: 75.7 SEC — HIGH (ref 27.5–37.4)
APTT BLD: 89.4 SEC — HIGH (ref 27.5–37.4)
AST SERPL-CCNC: 26 U/L — SIGNIFICANT CHANGE UP (ref 10–40)
BILIRUB SERPL-MCNC: 0.2 MG/DL — SIGNIFICANT CHANGE UP (ref 0.2–1.2)
BLD GP AB SCN SERPL QL: NEGATIVE — SIGNIFICANT CHANGE UP
BUN SERPL-MCNC: 14 MG/DL — SIGNIFICANT CHANGE UP (ref 7–23)
CALCIUM SERPL-MCNC: 9.5 MG/DL — SIGNIFICANT CHANGE UP (ref 8.4–10.5)
CHLORIDE SERPL-SCNC: 102 MMOL/L — SIGNIFICANT CHANGE UP (ref 96–108)
CO2 SERPL-SCNC: 25 MMOL/L — SIGNIFICANT CHANGE UP (ref 22–31)
CREAT SERPL-MCNC: 0.85 MG/DL — SIGNIFICANT CHANGE UP (ref 0.5–1.3)
GLUCOSE SERPL-MCNC: 104 MG/DL — HIGH (ref 70–99)
HCT VFR BLD CALC: 36.6 % — SIGNIFICANT CHANGE UP (ref 34.5–45)
HGB BLD-MCNC: 11.9 G/DL — SIGNIFICANT CHANGE UP (ref 11.5–15.5)
MAGNESIUM SERPL-MCNC: 2 MG/DL — SIGNIFICANT CHANGE UP (ref 1.6–2.6)
MCHC RBC-ENTMCNC: 26.4 PG — LOW (ref 27–34)
MCHC RBC-ENTMCNC: 32.5 G/DL — SIGNIFICANT CHANGE UP (ref 32–36)
MCV RBC AUTO: 81.3 FL — SIGNIFICANT CHANGE UP (ref 80–100)
PLATELET # BLD AUTO: 268 K/UL — SIGNIFICANT CHANGE UP (ref 150–400)
POTASSIUM SERPL-MCNC: 4.2 MMOL/L — SIGNIFICANT CHANGE UP (ref 3.5–5.3)
POTASSIUM SERPL-SCNC: 4.2 MMOL/L — SIGNIFICANT CHANGE UP (ref 3.5–5.3)
PROT SERPL-MCNC: 7 G/DL — SIGNIFICANT CHANGE UP (ref 6–8.3)
RBC # BLD: 4.5 M/UL — SIGNIFICANT CHANGE UP (ref 3.8–5.2)
RBC # FLD: 13.3 % — SIGNIFICANT CHANGE UP (ref 10.3–16.9)
RH IG SCN BLD-IMP: NEGATIVE — SIGNIFICANT CHANGE UP
SODIUM SERPL-SCNC: 137 MMOL/L — SIGNIFICANT CHANGE UP (ref 135–145)
WBC # BLD: 4.8 K/UL — SIGNIFICANT CHANGE UP (ref 3.8–10.5)
WBC # FLD AUTO: 4.8 K/UL — SIGNIFICANT CHANGE UP (ref 3.8–10.5)

## 2018-04-04 PROCEDURE — 93880 EXTRACRANIAL BILAT STUDY: CPT | Mod: 26

## 2018-04-04 PROCEDURE — 99291 CRITICAL CARE FIRST HOUR: CPT

## 2018-04-04 PROCEDURE — 94010 BREATHING CAPACITY TEST: CPT | Mod: 26

## 2018-04-04 PROCEDURE — 70450 CT HEAD/BRAIN W/O DYE: CPT | Mod: 26

## 2018-04-04 PROCEDURE — 93010 ELECTROCARDIOGRAM REPORT: CPT

## 2018-04-04 PROCEDURE — 71046 X-RAY EXAM CHEST 2 VIEWS: CPT | Mod: 26

## 2018-04-04 RX ORDER — HEPARIN SODIUM 5000 [USP'U]/ML
800 INJECTION INTRAVENOUS; SUBCUTANEOUS
Qty: 25000 | Refills: 0 | Status: DISCONTINUED | OUTPATIENT
Start: 2018-04-04 | End: 2018-04-05

## 2018-04-04 RX ORDER — CHLORHEXIDINE GLUCONATE 213 G/1000ML
5 SOLUTION TOPICAL ONCE
Qty: 0 | Refills: 0 | Status: COMPLETED | OUTPATIENT
Start: 2018-04-04 | End: 2018-04-05

## 2018-04-04 RX ORDER — AMLODIPINE BESYLATE 2.5 MG/1
5 TABLET ORAL DAILY
Qty: 0 | Refills: 0 | Status: DISCONTINUED | OUTPATIENT
Start: 2018-04-04 | End: 2018-04-05

## 2018-04-04 RX ADMIN — Medication 81 MILLIGRAM(S): at 11:36

## 2018-04-04 RX ADMIN — RANOLAZINE 500 MILLIGRAM(S): 500 TABLET, FILM COATED, EXTENDED RELEASE ORAL at 06:07

## 2018-04-04 RX ADMIN — CHLORHEXIDINE GLUCONATE 1 APPLICATION(S): 213 SOLUTION TOPICAL at 23:50

## 2018-04-04 RX ADMIN — HEPARIN SODIUM 8 UNIT(S)/HR: 5000 INJECTION INTRAVENOUS; SUBCUTANEOUS at 21:10

## 2018-04-04 RX ADMIN — ATORVASTATIN CALCIUM 80 MILLIGRAM(S): 80 TABLET, FILM COATED ORAL at 21:10

## 2018-04-04 RX ADMIN — RANOLAZINE 500 MILLIGRAM(S): 500 TABLET, FILM COATED, EXTENDED RELEASE ORAL at 18:12

## 2018-04-04 RX ADMIN — Medication 50 MILLIGRAM(S): at 06:07

## 2018-04-04 RX ADMIN — ISOSORBIDE MONONITRATE 120 MILLIGRAM(S): 60 TABLET, EXTENDED RELEASE ORAL at 11:36

## 2018-04-04 RX ADMIN — AMLODIPINE BESYLATE 5 MILLIGRAM(S): 2.5 TABLET ORAL at 11:36

## 2018-04-04 NOTE — PROGRESS NOTE ADULT - PROBLEM SELECTOR PLAN 6
#Fluids - no IVF, tolerating PO  #Electrolytes - monitor & replete for K>4 Mg>2  #Diet - DASH/TLC #Fluids - no IVF, tolerating PO  #Electrolytes - monitor & replete for K>4 Mg>2  #Diet - DASH/TLC; NPO after midnight for surgery tomorrow

## 2018-04-04 NOTE — PROGRESS NOTE ADULT - PROBLEM SELECTOR PLAN 1
Patient with h/o CAD s/p CHENG to mLAD in 2013 now with one year history of progressively worsening exertional angina, who is now found on cardiac catheterization.  Findings were LM 70%, mLAD 50%, oLCx 70%, RCA 50% EF 60%.  -- Dr. Murrieta of CT surgery was consulted for possible surgical intervention and hybrid procedure.  Troponins negative, EKG without ischemic changes.    -- Holding home Plavix in setting of possible intervention  -- Heparin gtt for AC  -- C/w metoprolol succinate 50 mg PO daily  -- Initiate atorvastatin 80 mg PO daily  -- Hold home losartan 50 mg PO daily in setting of cath and planned procedure Patient with h/o CAD s/p CHENG to mLAD in 2013 now with one year history of progressively worsening exertional angina, who is now found on cardiac catheterization.  Findings were LM 70%, mLAD 50%, oLCx 70%, RCA 50% EF 60%.  -- Dr. Murrieta of CT surgery was consulted for possible surgical intervention and hybrid procedure.  Troponins negative, EKG without ischemic changes.    -- Holding home Plavix in setting of possible intervention  -- Heparin gtt for AC  -- C/w metoprolol succinate 50 mg PO daily  -- Initiate atorvastatin 80 mg PO daily  -- Hold home losartan 50 mg PO daily in setting of cath and planned procedure  -- CTH, CXR PA/Lateral, PFT, carotid ultrasounds per CT surgery request  -- Preop labs

## 2018-04-04 NOTE — PROGRESS NOTE ADULT - PROBLEM SELECTOR PLAN 3
Patient with h/o HTN  -- holding home losartan 50 mg PO daily in setting of cath and planned procedure  -- holding home amlodipine   -- telemetry monitoring of hemodynamics, consider PRN hydralazine if patient becomes hypertensive. Patient with h/o HTN  -- holding home losartan 50 mg PO daily in setting of cath and planned procedure  -- isosorbide mononitrate ER 120mg PO q24h  -- starting amlodipine 5mg q24h   -- telemetry monitoring of hemodynamics, consider PRN hydralazine if patient becomes hypertensive.

## 2018-04-04 NOTE — PRE-OP CHECKLIST - SELECT TESTS ORDERED
CMP/INR/Spirometry/EKG/Type and Cross/Type and Screen/BMP/CBC/PT/PTT BMP/PT/PTT/INR/Type and Cross/Urinalysis/Type and Screen/Spirometry/EKG/CXR/CBC/CMP

## 2018-04-04 NOTE — PROGRESS NOTE ADULT - PROBLEM SELECTOR PLAN 7
#DVT PPX - on heparin gtt  #Code - full  #Dispo - CCU for monitoring after cath and in advance of planned procedure #DVT PPX - on heparin gtt  #Code - full  #Dispo - CCU for monitoring after cath and in advance of planned surgery

## 2018-04-04 NOTE — PROGRESS NOTE ADULT - SUBJECTIVE AND OBJECTIVE BOX
Planned Date of Surgery: 18                                                                                                              Surgeon: Dr. Murrieta     Procedure: MIDCAB     HPI:  67 year-old female with a PMH of CAD (s/p CHENG to mLAD in ), HTN, HLD, and FH significant for CAD, who presented to her cardiologist, Dr. Cortes, for exertional chest pain. the patient underwent a nuclear stress test on 2017 that demonstrated an EF of 67%, J point depression 0.5 to 1mm in inferolateral leads w/ upsloping ST segment, and normal myocardial perfusion.  However, patient experienced chest pain at peak exercise, which resolved during recovery. The stress test was terminated due to chest pain.  The patient previously underwent cardiac catheterization at [facility] on 2013 that demonstrated the following: LVEF 65%. No AS. No MR. LM: large sized with minor luminal irregularities. CSA IVUS 7.0 mm2. LAD: large sized, proximal 50% IVUS CSA 5.3 mm2, lesion extends to mid LAD with 4.0 mm2 (proximal to 99 % lesion in mid LAD). Promus CHENG mLAD 99% with residual 0% stenosis. LCx: (dominant), large sized, minor luminal irregularities. RCA: minor luminal irregularities. Because of the patient's risk factors, known CAD, angina and abnormal stress test, she was referred for cardiac catheterization.  Cardiac cath revealed LM 70%, mLAD 50%, oLCx 70%, RCA 50% EF 60%.  Pt now preop for planned MIDCAB and staged PCI      PAST MEDICAL & SURGICAL HISTORY:  Coronary artery disease  Hyperlipidemia  Hypertension  H/O shoulder surgery  History of percutaneous coronary intervention      No Known Allergies      MEDICATIONS  (STANDING):  amLODIPine   Tablet 5 milliGRAM(s) Oral daily  aspirin enteric coated 81 milliGRAM(s) Oral daily  atorvastatin 80 milliGRAM(s) Oral at bedtime  chlorhexidine 0.12% Liquid 5 milliLiter(s) Swish and Spit once  chlorhexidine 4% Liquid 1 Application(s) Topical once  heparin  Infusion 950 Unit(s)/Hr (9.5 mL/Hr) IV Continuous <Continuous>  isosorbide   mononitrate ER Tablet (IMDUR) 120 milliGRAM(s) Oral daily  metoprolol succinate ER 50 milliGRAM(s) Oral daily  ranolazine 500 milliGRAM(s) Oral two times a day    MEDICATIONS  (PRN):      On Beta Blocker? YES     Labs:                        11.9   4.8   )-----------( 268      ( 2018 04:50 )             36.6         137  |  102  |  14  ----------------------------<  104<H>  4.2   |  25  |  0.85    Ca    9.5      2018 04:52  Mg     2.0     -    TPro  7.0  /  Alb  3.9  /  TBili  0.2  /  DBili  x   /  AST  26  /  ALT  12  /  AlkPhos  62  -04    PT/INR - ( 2018 19:00 )   PT: 13.9 sec;   INR: 1.25          PTT - ( 2018 04:51 )  PTT:73.3 sec  Urinalysis Basic - ( 2018 23:10 )    Color: Yellow / Appearance: Clear / S.010 / pH: x  Gluc: x / Ketone: NEGATIVE  / Bili: Negative / Urobili: 0.2 E.U./dL   Blood: x / Protein: NEGATIVE mg/dL / Nitrite: NEGATIVE   Leuk Esterase: NEGATIVE / RBC: x / WBC x   Sq Epi: x / Non Sq Epi: x / Bacteria: x      ABO Interpretation: A (18 @ 05:30)      CARDIAC MARKERS ( 2018 12:00 )  x     / <0.01 ng/mL / 345 U/L / x     / 5.6 ng/mL          Hgb A1C: 5.9    EKG:  Diagnosis Line Sinus bradycardia  Inferior infarct , age undetermined  Cannot rule out Anterior infarct , age undetermined    CXR:  There is no focal pulmonary consolidation. There is no pleural   effusion The heart is not enlarged. Multilevel degenerative changes   throughout the spine .    CT Scans:      Cath Report:    Echo:    PFT's:    Carotid Duplex:    Consult in Chart?  YES / NO  Consent in Chart? YES / NO  Pre-op Orders Placed? YES / NO  Blood Prodeucts Ordered? YES / NO  NPO ordered? YES / NO Planned Date of Surgery: 18                                                                                                              Surgeon: Dr. Murrieta     Procedure: MIDCAB     HPI:  67 year-old female with a PMH of CAD (s/p CHENG to mLAD in ), HTN, HLD, and FH significant for CAD, who presented to her cardiologist, Dr. Cortes, for exertional chest pain. the patient underwent a nuclear stress test on 2017 that demonstrated an EF of 67%, J point depression 0.5 to 1mm in inferolateral leads w/ upsloping ST segment, and normal myocardial perfusion.  However, patient experienced chest pain at peak exercise, which resolved during recovery. The stress test was terminated due to chest pain.  The patient previously underwent cardiac catheterization at [facility] on 2013 that demonstrated the following: LVEF 65%. No AS. No MR. LM: large sized with minor luminal irregularities. CSA IVUS 7.0 mm2. LAD: large sized, proximal 50% IVUS CSA 5.3 mm2, lesion extends to mid LAD with 4.0 mm2 (proximal to 99 % lesion in mid LAD). Promus CHENG mLAD 99% with residual 0% stenosis. LCx: (dominant), large sized, minor luminal irregularities. RCA: minor luminal irregularities. Because of the patient's risk factors, known CAD, angina and abnormal stress test, she was referred for cardiac catheterization.  Cardiac cath revealed LM 70%, mLAD 50%, oLCx 70%, RCA 50% EF 60%.  Pt now preop for planned MIDCAB and staged PCI      PAST MEDICAL & SURGICAL HISTORY:  Coronary artery disease  Hyperlipidemia  Hypertension  H/O shoulder surgery  History of percutaneous coronary intervention      No Known Allergies      MEDICATIONS  (STANDING):  amLODIPine   Tablet 5 milliGRAM(s) Oral daily  aspirin enteric coated 81 milliGRAM(s) Oral daily  atorvastatin 80 milliGRAM(s) Oral at bedtime  chlorhexidine 0.12% Liquid 5 milliLiter(s) Swish and Spit once  chlorhexidine 4% Liquid 1 Application(s) Topical once  heparin  Infusion 950 Unit(s)/Hr (9.5 mL/Hr) IV Continuous <Continuous>  isosorbide   mononitrate ER Tablet (IMDUR) 120 milliGRAM(s) Oral daily  metoprolol succinate ER 50 milliGRAM(s) Oral daily  ranolazine 500 milliGRAM(s) Oral two times a day    MEDICATIONS  (PRN):      On Beta Blocker? YES     Labs:                        11.9   4.8   )-----------( 268      ( 2018 04:50 )             36.6         137  |  102  |  14  ----------------------------<  104<H>  4.2   |  25  |  0.85    Ca    9.5      2018 04:52  Mg     2.0     -    TPro  7.0  /  Alb  3.9  /  TBili  0.2  /  DBili  x   /  AST  26  /  ALT  12  /  AlkPhos  62  04-04    PT/INR - ( 2018 19:00 )   PT: 13.9 sec;   INR: 1.25          PTT - ( 2018 04:51 )  PTT:73.3 sec  Urinalysis Basic - ( 2018 23:10 )    Color: Yellow / Appearance: Clear / S.010 / pH: x  Gluc: x / Ketone: NEGATIVE  / Bili: Negative / Urobili: 0.2 E.U./dL   Blood: x / Protein: NEGATIVE mg/dL / Nitrite: NEGATIVE   Leuk Esterase: NEGATIVE / RBC: x / WBC x   Sq Epi: x / Non Sq Epi: x / Bacteria: x      ABO Interpretation: A (18 @ 05:30)      CARDIAC MARKERS ( 2018 12:00 )  x     / <0.01 ng/mL / 345 U/L / x     / 5.6 ng/mL          Hgb A1C: 5.9    EKG:  Diagnosis Line Sinus bradycardia  Inferior infarct , age undetermined  Cannot rule out Anterior infarct , age undetermined    CXR:  There is no focal pulmonary consolidation. There is no pleural   effusion The heart is not enlarged. Multilevel degenerative changes   throughout the spine .    CT Scans:  Extensive areas of hypodensity in the cerebral white matter, as above.        Cath Report:  as above    Echo:    There is borderline concentric left ventricular hypertrophy.The left   ventricular wall motion is normal.The left ventricular ejection fraction   is estimated to be 65-70%The left atrial size is normal.Right atrial size is   normal.The right ventricle is normal in size and function.Structurally   normal aortic valve.No aortic regurgitation noted.Structurally normal mitral   valve.There is trace mitral regurgitation.Structurally normal tricuspid   valve.There is trace tricuspid regurgitation.There is no   echocardiographic evidence for pulmonary hypertension.Structurally normal pulmonic valve.No   aortic root dilatation.There is no pericardial effusion.        PFT's:    Carotid Duplex:    No hemodynamically significant stenosi        Consult in Chart?  YES   Consent in Chart? YES   Pre-op Orders Placed? YES   Blood Prodeucts Ordered? YES   NPO ordered? YES

## 2018-04-04 NOTE — PROGRESS NOTE ADULT - PROBLEM SELECTOR PLAN 4
Patient with h/o HLD, unclear history of statin use  -- Initiate atorvastatin 80 mg PO daily Patient with h/o HLD, unclear history of statin use  -- atorvastatin 80 mg PO daily

## 2018-04-04 NOTE — PROGRESS NOTE ADULT - PROBLEM SELECTOR PLAN 2
Patient experiencing chest pain likely anginal in nature.    -- C/w morphine 2 mg IV PRN for chest pain  -- Management for CAD as above Resolved. Patient experiencing chest pain likely anginal in nature.    -- Management for CAD as above  -- Ranolazine 500mg BID

## 2018-04-05 ENCOUNTER — APPOINTMENT (OUTPATIENT)
Dept: CARDIOTHORACIC SURGERY | Facility: HOSPITAL | Age: 68
End: 2018-04-05

## 2018-04-05 DIAGNOSIS — I25.110 ATHEROSCLEROTIC HEART DISEASE OF NATIVE CORONARY ARTERY WITH UNSTABLE ANGINA PECTORIS: ICD-10-CM

## 2018-04-05 DIAGNOSIS — E78.5 HYPERLIPIDEMIA, UNSPECIFIED: ICD-10-CM

## 2018-04-05 DIAGNOSIS — I10 ESSENTIAL (PRIMARY) HYPERTENSION: ICD-10-CM

## 2018-04-05 LAB
ALBUMIN SERPL ELPH-MCNC: 3.8 G/DL — SIGNIFICANT CHANGE UP (ref 3.3–5)
ALBUMIN SERPL ELPH-MCNC: 4.2 G/DL — SIGNIFICANT CHANGE UP (ref 3.3–5)
ALBUMIN SERPL ELPH-MCNC: 4.3 G/DL — SIGNIFICANT CHANGE UP (ref 3.3–5)
ALP SERPL-CCNC: 58 U/L — SIGNIFICANT CHANGE UP (ref 40–120)
ALP SERPL-CCNC: 65 U/L — SIGNIFICANT CHANGE UP (ref 40–120)
ALP SERPL-CCNC: 68 U/L — SIGNIFICANT CHANGE UP (ref 40–120)
ALT FLD-CCNC: 13 U/L — SIGNIFICANT CHANGE UP (ref 10–45)
ALT FLD-CCNC: 14 U/L — SIGNIFICANT CHANGE UP (ref 10–45)
ALT FLD-CCNC: 16 U/L — SIGNIFICANT CHANGE UP (ref 10–45)
ANION GAP SERPL CALC-SCNC: 12 MMOL/L — SIGNIFICANT CHANGE UP (ref 5–17)
ANION GAP SERPL CALC-SCNC: 13 MMOL/L — SIGNIFICANT CHANGE UP (ref 5–17)
ANION GAP SERPL CALC-SCNC: 16 MMOL/L — SIGNIFICANT CHANGE UP (ref 5–17)
APTT BLD: 30.7 SEC — SIGNIFICANT CHANGE UP (ref 27.5–37.4)
APTT BLD: 77.7 SEC — HIGH (ref 27.5–37.4)
APTT BLD: 83.1 SEC — HIGH (ref 27.5–37.4)
AST SERPL-CCNC: 26 U/L — SIGNIFICANT CHANGE UP (ref 10–40)
AST SERPL-CCNC: 30 U/L — SIGNIFICANT CHANGE UP (ref 10–40)
AST SERPL-CCNC: 38 U/L — SIGNIFICANT CHANGE UP (ref 10–40)
BASE EXCESS BLDA CALC-SCNC: -1.7 MMOL/L — SIGNIFICANT CHANGE UP (ref -2–3)
BASE EXCESS BLDV CALC-SCNC: 0.3 MMOL/L — SIGNIFICANT CHANGE UP
BASOPHILS NFR BLD AUTO: 0.1 % — SIGNIFICANT CHANGE UP (ref 0–2)
BILIRUB DIRECT SERPL-MCNC: <0.2 MG/DL — SIGNIFICANT CHANGE UP (ref 0–0.2)
BILIRUB INDIRECT FLD-MCNC: >0.1 MG/DL — LOW (ref 0.2–1)
BILIRUB SERPL-MCNC: 0.3 MG/DL — SIGNIFICANT CHANGE UP (ref 0.2–1.2)
BUN SERPL-MCNC: 12 MG/DL — SIGNIFICANT CHANGE UP (ref 7–23)
BUN SERPL-MCNC: 13 MG/DL — SIGNIFICANT CHANGE UP (ref 7–23)
BUN SERPL-MCNC: 15 MG/DL — SIGNIFICANT CHANGE UP (ref 7–23)
CALCIUM SERPL-MCNC: 10.2 MG/DL — SIGNIFICANT CHANGE UP (ref 8.4–10.5)
CALCIUM SERPL-MCNC: 8.9 MG/DL — SIGNIFICANT CHANGE UP (ref 8.4–10.5)
CALCIUM SERPL-MCNC: 9.2 MG/DL — SIGNIFICANT CHANGE UP (ref 8.4–10.5)
CHLORIDE SERPL-SCNC: 101 MMOL/L — SIGNIFICANT CHANGE UP (ref 96–108)
CHLORIDE SERPL-SCNC: 96 MMOL/L — SIGNIFICANT CHANGE UP (ref 96–108)
CHLORIDE SERPL-SCNC: 99 MMOL/L — SIGNIFICANT CHANGE UP (ref 96–108)
CO2 SERPL-SCNC: 22 MMOL/L — SIGNIFICANT CHANGE UP (ref 22–31)
CO2 SERPL-SCNC: 24 MMOL/L — SIGNIFICANT CHANGE UP (ref 22–31)
CO2 SERPL-SCNC: 27 MMOL/L — SIGNIFICANT CHANGE UP (ref 22–31)
CREAT SERPL-MCNC: 0.72 MG/DL — SIGNIFICANT CHANGE UP (ref 0.5–1.3)
CREAT SERPL-MCNC: 0.78 MG/DL — SIGNIFICANT CHANGE UP (ref 0.5–1.3)
CREAT SERPL-MCNC: 0.91 MG/DL — SIGNIFICANT CHANGE UP (ref 0.5–1.3)
EOSINOPHIL NFR BLD AUTO: 0.8 % — SIGNIFICANT CHANGE UP (ref 0–6)
GAS PNL BLDA: SIGNIFICANT CHANGE UP
GAS PNL BLDV: SIGNIFICANT CHANGE UP
GLUCOSE BLDC GLUCOMTR-MCNC: 119 MG/DL — HIGH (ref 70–99)
GLUCOSE BLDC GLUCOMTR-MCNC: 124 MG/DL — HIGH (ref 70–99)
GLUCOSE BLDC GLUCOMTR-MCNC: 128 MG/DL — HIGH (ref 70–99)
GLUCOSE BLDC GLUCOMTR-MCNC: 132 MG/DL — HIGH (ref 70–99)
GLUCOSE SERPL-MCNC: 158 MG/DL — HIGH (ref 70–99)
GLUCOSE SERPL-MCNC: 175 MG/DL — HIGH (ref 70–99)
GLUCOSE SERPL-MCNC: 98 MG/DL — SIGNIFICANT CHANGE UP (ref 70–99)
HCO3 BLDA-SCNC: 24 MMOL/L — SIGNIFICANT CHANGE UP (ref 21–28)
HCO3 BLDV-SCNC: 27 MMOL/L — SIGNIFICANT CHANGE UP (ref 20–27)
HCT VFR BLD CALC: 35.2 % — SIGNIFICANT CHANGE UP (ref 34.5–45)
HCT VFR BLD CALC: 37.3 % — SIGNIFICANT CHANGE UP (ref 34.5–45)
HCT VFR BLD CALC: 39 % — SIGNIFICANT CHANGE UP (ref 34.5–45)
HGB BLD-MCNC: 11.3 G/DL — LOW (ref 11.5–15.5)
HGB BLD-MCNC: 12.3 G/DL — SIGNIFICANT CHANGE UP (ref 11.5–15.5)
HGB BLD-MCNC: 12.7 G/DL — SIGNIFICANT CHANGE UP (ref 11.5–15.5)
INR BLD: 1.18 — HIGH (ref 0.88–1.16)
INR BLD: 1.37 — HIGH (ref 0.88–1.16)
LACTATE SERPL-SCNC: 2.3 MMOL/L — HIGH (ref 0.5–2)
LYMPHOCYTES # BLD AUTO: 21.3 % — SIGNIFICANT CHANGE UP (ref 13–44)
MAGNESIUM SERPL-MCNC: 1.7 MG/DL — SIGNIFICANT CHANGE UP (ref 1.6–2.6)
MAGNESIUM SERPL-MCNC: 2 MG/DL — SIGNIFICANT CHANGE UP (ref 1.6–2.6)
MCHC RBC-ENTMCNC: 26.1 PG — LOW (ref 27–34)
MCHC RBC-ENTMCNC: 26.3 PG — LOW (ref 27–34)
MCHC RBC-ENTMCNC: 26.3 PG — LOW (ref 27–34)
MCHC RBC-ENTMCNC: 32.1 G/DL — SIGNIFICANT CHANGE UP (ref 32–36)
MCHC RBC-ENTMCNC: 32.6 G/DL — SIGNIFICANT CHANGE UP (ref 32–36)
MCHC RBC-ENTMCNC: 33 G/DL — SIGNIFICANT CHANGE UP (ref 32–36)
MCV RBC AUTO: 79 FL — LOW (ref 80–100)
MCV RBC AUTO: 80.9 FL — SIGNIFICANT CHANGE UP (ref 80–100)
MCV RBC AUTO: 81.9 FL — SIGNIFICANT CHANGE UP (ref 80–100)
MONOCYTES NFR BLD AUTO: 3.4 % — SIGNIFICANT CHANGE UP (ref 2–14)
NEUTROPHILS NFR BLD AUTO: 74.4 % — SIGNIFICANT CHANGE UP (ref 43–77)
PCO2 BLDA: 42 MMHG — SIGNIFICANT CHANGE UP (ref 32–45)
PCO2 BLDV: 51 MMHG — SIGNIFICANT CHANGE UP (ref 41–51)
PH BLDA: 7.37 — SIGNIFICANT CHANGE UP (ref 7.35–7.45)
PH BLDV: 7.34 — SIGNIFICANT CHANGE UP (ref 7.32–7.43)
PHOSPHATE SERPL-MCNC: 3.4 MG/DL — SIGNIFICANT CHANGE UP (ref 2.5–4.5)
PLATELET # BLD AUTO: 245 K/UL — SIGNIFICANT CHANGE UP (ref 150–400)
PLATELET # BLD AUTO: 293 K/UL — SIGNIFICANT CHANGE UP (ref 150–400)
PLATELET # BLD AUTO: 303 K/UL — SIGNIFICANT CHANGE UP (ref 150–400)
PO2 BLDA: 131 MMHG — HIGH (ref 83–108)
PO2 BLDV: 40 MMHG — SIGNIFICANT CHANGE UP
POTASSIUM SERPL-MCNC: 4 MMOL/L — SIGNIFICANT CHANGE UP (ref 3.5–5.3)
POTASSIUM SERPL-MCNC: 4.4 MMOL/L — SIGNIFICANT CHANGE UP (ref 3.5–5.3)
POTASSIUM SERPL-MCNC: 4.8 MMOL/L — SIGNIFICANT CHANGE UP (ref 3.5–5.3)
POTASSIUM SERPL-SCNC: 4 MMOL/L — SIGNIFICANT CHANGE UP (ref 3.5–5.3)
POTASSIUM SERPL-SCNC: 4.4 MMOL/L — SIGNIFICANT CHANGE UP (ref 3.5–5.3)
POTASSIUM SERPL-SCNC: 4.8 MMOL/L — SIGNIFICANT CHANGE UP (ref 3.5–5.3)
PROT SERPL-MCNC: 6.9 G/DL — SIGNIFICANT CHANGE UP (ref 6–8.3)
PROT SERPL-MCNC: 7.8 G/DL — SIGNIFICANT CHANGE UP (ref 6–8.3)
PROT SERPL-MCNC: 7.8 G/DL — SIGNIFICANT CHANGE UP (ref 6–8.3)
PROTHROM AB SERPL-ACNC: 13.1 SEC — HIGH (ref 9.8–12.7)
PROTHROM AB SERPL-ACNC: 15.3 SEC — HIGH (ref 9.8–12.7)
RBC # BLD: 4.3 M/UL — SIGNIFICANT CHANGE UP (ref 3.8–5.2)
RBC # BLD: 4.72 M/UL — SIGNIFICANT CHANGE UP (ref 3.8–5.2)
RBC # BLD: 4.82 M/UL — SIGNIFICANT CHANGE UP (ref 3.8–5.2)
RBC # FLD: 13.1 % — SIGNIFICANT CHANGE UP (ref 10.3–16.9)
RBC # FLD: 13.2 % — SIGNIFICANT CHANGE UP (ref 10.3–16.9)
RBC # FLD: 13.3 % — SIGNIFICANT CHANGE UP (ref 10.3–16.9)
SAO2 % BLDA: 99 % — SIGNIFICANT CHANGE UP (ref 95–100)
SAO2 % BLDV: 69 % — SIGNIFICANT CHANGE UP
SODIUM SERPL-SCNC: 134 MMOL/L — LOW (ref 135–145)
SODIUM SERPL-SCNC: 136 MMOL/L — SIGNIFICANT CHANGE UP (ref 135–145)
SODIUM SERPL-SCNC: 140 MMOL/L — SIGNIFICANT CHANGE UP (ref 135–145)
WBC # BLD: 10 K/UL — SIGNIFICANT CHANGE UP (ref 3.8–10.5)
WBC # BLD: 5.4 K/UL — SIGNIFICANT CHANGE UP (ref 3.8–10.5)
WBC # BLD: 7.6 K/UL — SIGNIFICANT CHANGE UP (ref 3.8–10.5)
WBC # FLD AUTO: 10 K/UL — SIGNIFICANT CHANGE UP (ref 3.8–10.5)
WBC # FLD AUTO: 5.4 K/UL — SIGNIFICANT CHANGE UP (ref 3.8–10.5)
WBC # FLD AUTO: 7.6 K/UL — SIGNIFICANT CHANGE UP (ref 3.8–10.5)

## 2018-04-05 PROCEDURE — 99292 CRITICAL CARE ADDL 30 MIN: CPT

## 2018-04-05 PROCEDURE — 93010 ELECTROCARDIOGRAM REPORT: CPT | Mod: 77

## 2018-04-05 PROCEDURE — 99291 CRITICAL CARE FIRST HOUR: CPT

## 2018-04-05 PROCEDURE — 76998 US GUIDE INTRAOP: CPT | Mod: 26,AS

## 2018-04-05 PROCEDURE — 33533 CABG ARTERIAL SINGLE: CPT | Mod: AS

## 2018-04-05 PROCEDURE — 71045 X-RAY EXAM CHEST 1 VIEW: CPT | Mod: 26

## 2018-04-05 PROCEDURE — 33533 CABG ARTERIAL SINGLE: CPT

## 2018-04-05 PROCEDURE — 76998 US GUIDE INTRAOP: CPT | Mod: 26,59

## 2018-04-05 RX ORDER — ACETAMINOPHEN 500 MG
1000 TABLET ORAL ONCE
Qty: 0 | Refills: 0 | Status: COMPLETED | OUTPATIENT
Start: 2018-04-05 | End: 2018-04-05

## 2018-04-05 RX ORDER — ASPIRIN/CALCIUM CARB/MAGNESIUM 324 MG
81 TABLET ORAL DAILY
Qty: 0 | Refills: 0 | Status: DISCONTINUED | OUTPATIENT
Start: 2018-04-05 | End: 2018-04-12

## 2018-04-05 RX ORDER — CLOPIDOGREL BISULFATE 75 MG/1
75 TABLET, FILM COATED ORAL DAILY
Qty: 0 | Refills: 0 | Status: DISCONTINUED | OUTPATIENT
Start: 2018-04-05 | End: 2018-04-12

## 2018-04-05 RX ORDER — FENTANYL CITRATE 50 UG/ML
25 INJECTION INTRAVENOUS ONCE
Qty: 0 | Refills: 0 | Status: DISCONTINUED | OUTPATIENT
Start: 2018-04-05 | End: 2018-04-05

## 2018-04-05 RX ORDER — ALBUMIN HUMAN 25 %
250 VIAL (ML) INTRAVENOUS ONCE
Qty: 0 | Refills: 0 | Status: COMPLETED | OUTPATIENT
Start: 2018-04-05 | End: 2018-04-05

## 2018-04-05 RX ORDER — HEPARIN SODIUM 5000 [USP'U]/ML
700 INJECTION INTRAVENOUS; SUBCUTANEOUS
Qty: 25000 | Refills: 0 | Status: DISCONTINUED | OUTPATIENT
Start: 2018-04-05 | End: 2018-04-05

## 2018-04-05 RX ORDER — MAGNESIUM SULFATE 500 MG/ML
2 VIAL (ML) INJECTION ONCE
Qty: 0 | Refills: 0 | Status: COMPLETED | OUTPATIENT
Start: 2018-04-05 | End: 2018-04-05

## 2018-04-05 RX ORDER — INSULIN HUMAN 100 [IU]/ML
1 INJECTION, SOLUTION SUBCUTANEOUS
Qty: 100 | Refills: 0 | Status: DISCONTINUED | OUTPATIENT
Start: 2018-04-05 | End: 2018-04-06

## 2018-04-05 RX ORDER — SODIUM CHLORIDE 9 MG/ML
1000 INJECTION, SOLUTION INTRAVENOUS
Qty: 0 | Refills: 0 | Status: DISCONTINUED | OUTPATIENT
Start: 2018-04-05 | End: 2018-04-12

## 2018-04-05 RX ORDER — MEPERIDINE HYDROCHLORIDE 50 MG/ML
25 INJECTION INTRAMUSCULAR; INTRAVENOUS; SUBCUTANEOUS ONCE
Qty: 0 | Refills: 0 | Status: DISCONTINUED | OUTPATIENT
Start: 2018-04-05 | End: 2018-04-05

## 2018-04-05 RX ORDER — BUPIVACAINE 13.3 MG/ML
20 INJECTION, SUSPENSION, LIPOSOMAL INFILTRATION ONCE
Qty: 0 | Refills: 0 | Status: DISCONTINUED | OUTPATIENT
Start: 2018-04-05 | End: 2018-04-05

## 2018-04-05 RX ORDER — PANTOPRAZOLE SODIUM 20 MG/1
40 TABLET, DELAYED RELEASE ORAL ONCE
Qty: 0 | Refills: 0 | Status: COMPLETED | OUTPATIENT
Start: 2018-04-05 | End: 2018-04-05

## 2018-04-05 RX ORDER — FENTANYL CITRATE 50 UG/ML
12.5 INJECTION INTRAVENOUS
Qty: 0 | Refills: 0 | Status: DISCONTINUED | OUTPATIENT
Start: 2018-04-05 | End: 2018-04-06

## 2018-04-05 RX ORDER — LABETALOL HCL 100 MG
10 TABLET ORAL ONCE
Qty: 0 | Refills: 0 | Status: COMPLETED | OUTPATIENT
Start: 2018-04-05 | End: 2018-04-05

## 2018-04-05 RX ORDER — SODIUM CHLORIDE 9 MG/ML
1000 INJECTION, SOLUTION INTRAVENOUS
Qty: 0 | Refills: 0 | Status: DISCONTINUED | OUTPATIENT
Start: 2018-04-05 | End: 2018-04-06

## 2018-04-05 RX ORDER — CEFAZOLIN SODIUM 1 G
2000 VIAL (EA) INJECTION EVERY 8 HOURS
Qty: 0 | Refills: 0 | Status: COMPLETED | OUTPATIENT
Start: 2018-04-05 | End: 2018-04-07

## 2018-04-05 RX ORDER — ACETAMINOPHEN 500 MG
1000 TABLET ORAL ONCE
Qty: 0 | Refills: 0 | Status: COMPLETED | OUTPATIENT
Start: 2018-04-05 | End: 2018-04-06

## 2018-04-05 RX ORDER — HEPARIN SODIUM 5000 [USP'U]/ML
750 INJECTION INTRAVENOUS; SUBCUTANEOUS
Qty: 25000 | Refills: 0 | Status: DISCONTINUED | OUTPATIENT
Start: 2018-04-05 | End: 2018-04-05

## 2018-04-05 RX ORDER — METOPROLOL TARTRATE 50 MG
25 TABLET ORAL EVERY 12 HOURS
Qty: 0 | Refills: 0 | Status: DISCONTINUED | OUTPATIENT
Start: 2018-04-05 | End: 2018-04-06

## 2018-04-05 RX ORDER — HEPARIN SODIUM 5000 [USP'U]/ML
5000 INJECTION INTRAVENOUS; SUBCUTANEOUS EVERY 8 HOURS
Qty: 0 | Refills: 0 | Status: DISCONTINUED | OUTPATIENT
Start: 2018-04-05 | End: 2018-04-12

## 2018-04-05 RX ADMIN — FENTANYL CITRATE 25 MICROGRAM(S): 50 INJECTION INTRAVENOUS at 20:50

## 2018-04-05 RX ADMIN — HEPARIN SODIUM 5000 UNIT(S): 5000 INJECTION INTRAVENOUS; SUBCUTANEOUS at 21:12

## 2018-04-05 RX ADMIN — INSULIN HUMAN 1 UNIT(S)/HR: 100 INJECTION, SOLUTION SUBCUTANEOUS at 21:30

## 2018-04-05 RX ADMIN — FENTANYL CITRATE 25 MICROGRAM(S): 50 INJECTION INTRAVENOUS at 22:40

## 2018-04-05 RX ADMIN — Medication 10 MILLIGRAM(S): at 23:33

## 2018-04-05 RX ADMIN — RANOLAZINE 500 MILLIGRAM(S): 500 TABLET, FILM COATED, EXTENDED RELEASE ORAL at 06:10

## 2018-04-05 RX ADMIN — Medication 125 MILLILITER(S): at 23:40

## 2018-04-05 RX ADMIN — ISOSORBIDE MONONITRATE 120 MILLIGRAM(S): 60 TABLET, EXTENDED RELEASE ORAL at 10:15

## 2018-04-05 RX ADMIN — FENTANYL CITRATE 25 MICROGRAM(S): 50 INJECTION INTRAVENOUS at 22:25

## 2018-04-05 RX ADMIN — Medication 100 MILLIGRAM(S): at 21:13

## 2018-04-05 RX ADMIN — SODIUM CHLORIDE 999 MILLILITER(S): 9 INJECTION, SOLUTION INTRAVENOUS at 23:07

## 2018-04-05 RX ADMIN — Medication 125 MILLILITER(S): at 23:06

## 2018-04-05 RX ADMIN — Medication 81 MILLIGRAM(S): at 21:34

## 2018-04-05 RX ADMIN — HEPARIN SODIUM 7.5 UNIT(S)/HR: 5000 INJECTION INTRAVENOUS; SUBCUTANEOUS at 01:04

## 2018-04-05 RX ADMIN — CLOPIDOGREL BISULFATE 75 MILLIGRAM(S): 75 TABLET, FILM COATED ORAL at 21:34

## 2018-04-05 RX ADMIN — Medication 81 MILLIGRAM(S): at 10:15

## 2018-04-05 RX ADMIN — Medication 50 GRAM(S): at 20:59

## 2018-04-05 RX ADMIN — CHLORHEXIDINE GLUCONATE 5 MILLILITER(S): 213 SOLUTION TOPICAL at 10:20

## 2018-04-05 RX ADMIN — FENTANYL CITRATE 25 MICROGRAM(S): 50 INJECTION INTRAVENOUS at 20:35

## 2018-04-05 RX ADMIN — Medication 400 MILLIGRAM(S): at 18:58

## 2018-04-05 RX ADMIN — AMLODIPINE BESYLATE 5 MILLIGRAM(S): 2.5 TABLET ORAL at 06:10

## 2018-04-05 RX ADMIN — PANTOPRAZOLE SODIUM 40 MILLIGRAM(S): 20 TABLET, DELAYED RELEASE ORAL at 21:12

## 2018-04-05 RX ADMIN — HEPARIN SODIUM 7 UNIT(S)/HR: 5000 INJECTION INTRAVENOUS; SUBCUTANEOUS at 06:42

## 2018-04-05 RX ADMIN — Medication 1000 MILLIGRAM(S): at 20:12

## 2018-04-05 RX ADMIN — Medication 50 GRAM(S): at 19:11

## 2018-04-05 RX ADMIN — Medication 50 MILLIGRAM(S): at 06:10

## 2018-04-05 NOTE — PROGRESS NOTE ADULT - ATTENDING COMMENTS
67F with CAD s/p CHENG mLAD 2013, Essential HTN, Hyperlipidemia who presented with UA. Patient now s/p LHC 4/3 notable for LM 70%, oLAD 80%, oLCX 70%. Patient admitted to CCU for close HD monitoring while undergoing surgical evaluation by Dr. King CTsx team.      Vitals, labs, EKG and imaging reviewed 4/4  TTE 4/3: LVEF 65%  Exam notable for elderly female sitting in chair in NAD, JVP 2cm above clavicle, RRR, no MGR detected, clear lungs, abd NTTP, trace non pitting ORALIA, skin WWP, A&Ox3    -Carotid Duplex pending  -PFTs pending  -CTB pending  -CXR PA/Lat pending  -Cont hep gtt with PTT goal 50-70  -Cont ASA81/Atorva 80  -Holding Plavix perioperatively  -Cont Metoprolol 50XL daily   -Holding home ARB to avoid periop hypotension  -Amlodipine 5mg daily for BP control  -Will initiate Hydralazine po if needing additional Bp control  -Pt to remain in CCU while undergoing presurgical eval    MD Graeme  CCU Attending
67F with CAD s/p CHENG mLAD 2013, Essential HTN, Hyperlipidemia who presented with UA. Patient now s/p LHC 4/3 notable for LM 70%, oLAD 80%, oLCX 70%. Patient admitted to CCU for close HD monitoring and pre operative testing prior to CTSx with Dr. Fernando Murrieta    Vitals, labs, EKG and imaging reviewed 4/5  TTE 4/3: LVEF 65%  Exam notable for elderly female laying flat in bed, JVP 3cm above clavicle, RRR, no MGR detected, clear lungs, abd NTTP, trace ankle edema, skin WWP, A&Ox3    -All pre operative testing is complete  -Cont hep gtt with PTT goal 50-70, hold when called to OR  -Cont ASA81/Atorva 80  -Holding Plavix perioperatively  -Cont Metoprolol 50XL daily   -Holding home ARB perioperatively  -Amlodipine 5mg daily   -NPO for OR today 4/5    MD Graeme  CCU Attending

## 2018-04-05 NOTE — PROGRESS NOTE ADULT - PROBLEM SELECTOR PROBLEM 1
Coronary artery disease
Coronary artery disease involving native coronary artery of native heart with unstable angina pectoris
Coronary artery disease

## 2018-04-05 NOTE — PROGRESS NOTE ADULT - PROBLEM SELECTOR PLAN 3
-Continue Norvasc 5mg Qd  -Continue Metoprolol XL 50mg Qd  -Continue isosorbide mononitrate ER 120mg PO Qd  -Hold Home Losartan 50mg Qd pending surgery

## 2018-04-05 NOTE — PROGRESS NOTE ADULT - ASSESSMENT
67F with CAD s/p CHENG to mLAD in 2013, HTN, HLD, who presented with unstable angina. Was found to have LM 70%. oLAD 80%. mLAD 50%. oLCX 70% (dominant). RCA 50% on 4/3 cardiac cath. Currently in CCU for continued close HD monitoring pending midCAB with CTS today.

## 2018-04-05 NOTE — PROGRESS NOTE ADULT - PROBLEM SELECTOR PLAN 1
-Pending OR today for MidCAB; all pre-op studies performed   -Continue Metoprolol XL 50mg Qd  -Hold home Losartan 50mg Qd  -Continue ASA 81mg Qd  -Continue Atorvastatin 80mg QHS  -Stop Heparin gtt when called from OR

## 2018-04-05 NOTE — BRIEF OPERATIVE NOTE - PROCEDURE
<<-----Click on this checkbox to enter Procedure CABG, minimally invasive, robot assisted  04/05/2018  left anterior mini thoracotomy, LIMA to LAD  Active  [facility]

## 2018-04-05 NOTE — PROGRESS NOTE ADULT - SUBJECTIVE AND OBJECTIVE BOX
CTICU  CRITICAL  CARE  attending     Hand off received 					   Pertinent clinical, laboratory, radiographic, hemodynamic, echocardiographic, respiratory data, microbiologic data and chart were reviewed and analyzed frequently throughout the course of the day and night  Patient seen and examined with CTS/ SH attending at bedside    Pt is a 67y , Female, s/p robotic assisted midCAB  arrived to ICU; extubated  hypoxemia    Hyperlipidemia, unspecified hyperlipidemia type: Hyperlipidemia, unspecified hyperlipidemia type  Essential hypertension: Essential hypertension  Coronary artery disease involving native coronary artery of native heart with unstable angina pectoris: Coronary artery disease involving native coronary artery of native heart with unstable angina pectoris  Need for prophylactic measure: Need for prophylactic measure  Nutrition, metabolism, and development symptoms: Nutrition, metabolism, and development symptoms  Prediabetes: Prediabetes  Hyperlipidemia: Hyperlipidemia  Hypertension: Hypertension  Angina pectoris: Angina pectoris  Coronary artery disease: Coronary artery disease      , FAMILY HISTORY:  Family history of coronary artery disease (Father, Mother, Sibling, Grandparent)  PAST MEDICAL & SURGICAL HISTORY:  Coronary artery disease  Hyperlipidemia  Hypertension  H/O shoulder surgery  History of percutaneous coronary intervention    Patient is a 67y old  Female who presents with a chief complaint of     14 system review was unremarkable  acute changes include acute respiratory failure  Vital signs, hemodynamic and respiratory parameters were reviewed from the bedside nursing flowsheet.  ICU Vital Signs Last 24 Hrs  T(C): 35.8 (2018 18:13), Max: 36.9 (2018 05:00)  T(F): 96.4 (2018 18:13), Max: 98.5 (2018 05:00)  HR: 74 (2018 18:00) (52 - 78)  BP: 171/88 (2018 11:55) (107/64 - 171/88)  BP(mean): 85 (2018 11:00) (75 - 106)  ABP: 152/60 (2018 18:00) (148/60 - 158/64)  ABP(mean): 88 (2018 18:00) (84 - 94)  RR: 10 (2018 18:00) (10 - 26)  SpO2: 97% (2018 18:00) (93% - 100%)    Adult Advanced Hemodynamics Last 24 Hrs  CVP(mm Hg): --  CVP(cm H2O): --  CO: --  CI: --  PA: --  PA(mean): --  PCWP: --  SVR: --  SVRI: --  PVR: --  PVRI: --, ABG - ( 2018 16:27 )  pH: 7.37  /  pCO2: 42    /  pO2: 131   / HCO3: 24    / Base Excess: -1.7  /  SaO2: 99                  Intake and output was reviewed and the fluid balance was calculated  Daily     Daily Weight in k.9 (2018 06:00)  I&O's Summary    2018 07:01  -  2018 07:00  --------------------------------------------------------  IN: 686 mL / OUT: 1900 mL / NET: -1214 mL    2018 07:01  -  2018 18:33  --------------------------------------------------------  IN: 168 mL / OUT: 935 mL / NET: -767 mL        All lines and drain sites were assessed  [facility]      POCT Blood Glucose.: 119 mg/dL (2018 18:08)    No acute change in mental status  Auscultation of the chest reveals equal bs  Abdomen is soft  Extremities are warm and well perfused  Wounds appear clean and unremarkable  Antibiotics are periop    labs  CBC Full  -  ( 2018 16:30 )  WBC Count : 7.6 K/uL  Hemoglobin : 11.3 g/dL  Hematocrit : 35.2 %  Platelet Count - Automated : 245 K/uL  Mean Cell Volume : 81.9 fL  Mean Cell Hemoglobin : 26.3 pg  Mean Cell Hemoglobin Concentration : 32.1 g/dL  Auto Neutrophil # : x  Auto Lymphocyte # : x  Auto Monocyte # : x  Auto Eosinophil # : x  Auto Basophil # : x  Auto Neutrophil % : 74.4 %  Auto Lymphocyte % : 21.3 %  Auto Monocyte % : 3.4 %  Auto Eosinophil % : 0.8 %  Auto Basophil % : 0.1 %    05    134<L>  |  99  |  12  ----------------------------<  175<H>  4.4   |  22  |  0.72    Ca    8.9      2018 16:30  Phos  3.4     04-05  Mg     1.7     04-05    TPro  6.9  /  Alb  3.8  /  TBili  0.3  /  DBili  x   /  AST  30  /  ALT  13  /  AlkPhos  58  04-05    PT/INR - ( 2018 16:30 )   PT: 15.3 sec;   INR: 1.37          PTT - ( 2018 16:30 )  PTT:30.7 sec  The current medications were reviewed   MEDICATIONS  (STANDING):  aspirin enteric coated 81 milliGRAM(s) Oral daily  ceFAZolin   IVPB 2000 milliGRAM(s) IV Intermittent every 8 hours  clopidogrel Tablet 75 milliGRAM(s) Oral daily  heparin  Injectable 5000 Unit(s) SubCutaneous every 8 hours  insulin Infusion 1 Unit(s)/Hr (1 mL/Hr) IV Continuous <Continuous>  magnesium sulfate  IVPB 2 Gram(s) IV Intermittent once  magnesium sulfate  IVPB 2 Gram(s) IV Intermittent once  pantoprazole  Injectable 40 milliGRAM(s) IV Push once  sodium chloride 0.45%. 1000 milliLiter(s) (10 mL/Hr) IV Continuous <Continuous>    MEDICATIONS  (PRN):  meperidine     Injectable 25 milliGRAM(s) IV Push once PRN For Shivering       PROBLEM LIST/ ASSESSMENT:  HEALTH ISSUES - PROBLEM Dx:  Hyperlipidemia, unspecified hyperlipidemia type: Hyperlipidemia, unspecified hyperlipidemia type  Essential hypertension: Essential hypertension  Coronary artery disease involving native coronary artery of native heart with unstable angina pectoris: Coronary artery disease involving native coronary artery of native heart with unstable angina pectoris  Need for prophylactic measure: Need for prophylactic measure  Nutrition, metabolism, and development symptoms: Nutrition, metabolism, and development symptoms  Prediabetes: Prediabetes  Hyperlipidemia: Hyperlipidemia  Hypertension: Hypertension  Angina pectoris: Angina pectoris  Coronary artery disease: Coronary artery disease      ,   Patient is a 67y old  Female who presents with a chief complaint of    s/p MidCAB  acute changes include acute respiratory failure    My plan includes :  close hemodynamic, ventilatory and drain monitoring and management per post op routine    Monitor for arrhythmias and monitor parameters for organ perfusion  monitor neurologic status  Head of the bed should remain elevated to 45 deg .   chest PT and IS will be encouraged  monitor adequacy of oxygenation and ventilation and attempt to wean oxygen  Nutritional goals will be met using po eventually , ensure adequate caloric intake and montior the same  Stress ulcer and VTE prophylaxis will be achieved    Glycemic control is satisfactory  Electrolytes have been repleted as necessary and wound care has been carried out. Pain control has been achieved.   agressive physical therapy and early mobility and ambulation goals will be met   The family was updated about the course and plan  CRITICAL CARE TIME SPENT in evaluation and management, reassessments, review and interpretation of labs and x-rays, ventilator and hemodynamic management, formulating a plan and coordinating care: ___90____ MIN.  Time does not include procedural time.  CTICU ATTENDING     					    Eric Harding MD

## 2018-04-05 NOTE — PROGRESS NOTE ADULT - SUBJECTIVE AND OBJECTIVE BOX
O/N Events:    Subjective:    VITALS    Vital Signs Last 24 Hrs  T(C): 36.9 (2018 05:00), Max: 36.9 (2018 05:00)  T(F): 98.5 (2018 05:00), Max: 98.5 (2018 05:00)  HR: 54 (2018 07:00) (52 - 78)  BP: 128/65 (2018 06:00) (107/64 - 154/71)  BP(mean): 92 (2018 06:00) (75 - 106)  RR: 6 (2018 07:00) (6 - 29)  SpO2: 97% (2018 07:00) (96% - 99%)    I&O's Summary    2018 07:01  -  2018 07:00  --------------------------------------------------------  IN: 686 mL / OUT: 1900 mL / NET: -1214 mL    CAPILLARY BLOOD GLUCOSE    PHYSICAL EXAM  General: A&Ox3; NAD  Head: NC/AT;   Eyes: PERRL; EOMI; anicteric sclera  Neck: Supple; no JVD  Respiratory: CTA B/L; no wheezes/crackles/rales auscultated w/ good air movement  Cardiovascular: Regular rhythm/rate; S1/S2; no gallops or murmurs auscultated  Gastrointestinal: Soft; NTND w/out rebound tenderness or guarding; bowel sounds normal  Extremities: WWP; no edema or cyanosis; radial/pedal pulses palpable  Neurological:  CNII-XII grossly intact; no obvious focal deficits    MEDICATIONS  (STANDING):  amLODIPine   Tablet 5 milliGRAM(s) Oral daily  aspirin enteric coated 81 milliGRAM(s) Oral daily  atorvastatin 80 milliGRAM(s) Oral at bedtime  chlorhexidine 0.12% Liquid 5 milliLiter(s) Swish and Spit once  heparin  Infusion 700 Unit(s)/Hr (7 mL/Hr) IV Continuous <Continuous>  isosorbide   mononitrate ER Tablet (IMDUR) 120 milliGRAM(s) Oral daily  metoprolol succinate ER 50 milliGRAM(s) Oral daily  ranolazine 500 milliGRAM(s) Oral two times a day    MEDICATIONS  (PRN):      LABS                        12.7   5.4   )-----------( 293      ( 2018 05:50 )             39.0     04-05    140  |  101  |  15  ----------------------------<  98  4.8   |  27  |  0.91    Ca    10.2      2018 05:50  Mg     2.0     -    TPro  7.8  /  Alb  4.2  /  TBili  0.3  /  DBili  x   /  AST  26  /  ALT  14  /  AlkPhos  68  04-05    PT/INR - ( 2018 05:50 )   PT: 13.1 sec;   INR: 1.18          PTT - ( 2018 05:50 )  PTT:77.7 sec  Urinalysis Basic - ( 2018 23:10 )    Color: Yellow / Appearance: Clear / S.010 / pH: x  Gluc: x / Ketone: NEGATIVE  / Bili: Negative / Urobili: 0.2 E.U./dL   Blood: x / Protein: NEGATIVE mg/dL / Nitrite: NEGATIVE   Leuk Esterase: NEGATIVE / RBC: x / WBC x   Sq Epi: x / Non Sq Epi: x / Bacteria: x      CARDIAC MARKERS ( 2018 12:00 )  x     / <0.01 ng/mL / 345 U/L / x     / 5.6 ng/mL      IMAGING/EKG/ETC  EKG:   XR:  CT:   ECHO: O/N Events: Heparin drip was adjusted overnight to maintain PTT between 59-90. No other acute events.   Subjective: Patient has no complaints. Denies any current CP or SOB. 12pt ROS negative unless otherwise stated.     VITALS  Vital Signs Last 24 Hrs  T(C): 36.9 (2018 05:00), Max: 36.9 (2018 05:00)  T(F): 98.5 (2018 05:00), Max: 98.5 (2018 05:00)  HR: 54 (2018 07:00) (52 - 78)  BP: 128/65 (2018 06:00) (107/64 - 154/71)  BP(mean): 92 (2018 06:00) (75 - 106)  RR: 6 (2018 07:00) (6 - 29)  SpO2: 97% (2018 07:00) (96% - 99%)    I&O's Summary    2018 07:01  -  2018 07:00  --------------------------------------------------------  IN: 686 mL / OUT: 1900 mL / NET: -1214 mL    CAPILLARY BLOOD GLUCOSE    PHYSICAL EXAM  General: NAD, lying comfortably in bed.   Head: NC/AT;   Eyes: PERRL; EOMI; anicteric sclera  Neck: Supple; no JVD  Respiratory: CTA B/L; no wheezes/crackles/rales auscultated w/ good air movement  Cardiovascular: Bradycardic; S1/S2; no gallops or murmurs auscultated  Gastrointestinal: Soft; NTND w/out rebound tenderness or guarding; bowel sounds normal  Extremities: WWP; trace non-pitting edema or cyanosis; radial/pedal pulses palpable  Neurological:  AAOx3, CNII-XII grossly intact; no obvious focal deficits    MEDICATIONS  (STANDING):  amLODIPine   Tablet 5 milliGRAM(s) Oral daily  aspirin enteric coated 81 milliGRAM(s) Oral daily  atorvastatin 80 milliGRAM(s) Oral at bedtime  chlorhexidine 0.12% Liquid 5 milliLiter(s) Swish and Spit once  heparin  Infusion 700 Unit(s)/Hr (7 mL/Hr) IV Continuous <Continuous>  isosorbide   mononitrate ER Tablet (IMDUR) 120 milliGRAM(s) Oral daily  metoprolol succinate ER 50 milliGRAM(s) Oral daily  ranolazine 500 milliGRAM(s) Oral two times a day    MEDICATIONS  (PRN):      LABS                        12.7   5.4   )-----------( 293      ( 2018 05:50 )             39.0     04-05    140  |  101  |  15  ----------------------------<  98  4.8   |  27  |  0.91    Ca    10.2      2018 05:50  Mg     2.0     04-05    TPro  7.8  /  Alb  4.2  /  TBili  0.3  /  DBili  x   /  AST  26  /  ALT  14  /  AlkPhos  68  04-05    PT/INR - ( 2018 05:50 )   PT: 13.1 sec;   INR: 1.18          PTT - ( 2018 05:50 )  PTT:77.7 sec  Urinalysis Basic - ( 2018 23:10 )    Color: Yellow / Appearance: Clear / S.010 / pH: x  Gluc: x / Ketone: NEGATIVE  / Bili: Negative / Urobili: 0.2 E.U./dL   Blood: x / Protein: NEGATIVE mg/dL / Nitrite: NEGATIVE   Leuk Esterase: NEGATIVE / RBC: x / WBC x   Sq Epi: x / Non Sq Epi: x / Bacteria: x      CARDIAC MARKERS ( 2018 12:00 )  x     / <0.01 ng/mL / 345 U/L / x     / 5.6 ng/mL      IMAGING/EKG/ETC  EKG: Sinus bradycardia, Normal Axis at -30, Poor R-wave progression, borderline Left-Atrial Enlargement  XR:   ECHO: LVH, EF>65%, Normal Wall motion, Trace MR and TR.   Carotid U/S: No carotid Artery Stenosis O/N Events: Heparin drip was adjusted overnight to maintain PTT between 59-90. No other acute events.   Subjective: Patient has no complaints. Denies any current CP or SOB. 12pt ROS negative unless otherwise stated.     VITALS  Vital Signs Last 24 Hrs  T(C): 36.9 (2018 05:00), Max: 36.9 (2018 05:00)  T(F): 98.5 (2018 05:00), Max: 98.5 (2018 05:00)  HR: 54 (2018 07:00) (52 - 78)  BP: 128/65 (2018 06:00) (107/64 - 154/71)  BP(mean): 92 (2018 06:00) (75 - 106)  RR: 6 (2018 07:00) (6 - 29)  SpO2: 97% (2018 07:00) (96% - 99%)    I&O's Summary    2018 07:01  -  2018 07:00  --------------------------------------------------------  IN: 686 mL / OUT: 1900 mL / NET: -1214 mL    CAPILLARY BLOOD GLUCOSE    PHYSICAL EXAM  General: NAD, lying comfortably in bed.   Head: NC/AT;   Eyes: PERRL; EOMI; anicteric sclera  Neck: Supple; no JVD  Respiratory: CTA B/L; no wheezes/crackles/rales auscultated w/ good air movement  Cardiovascular: Bradycardic; S1/S2; no gallops or murmurs auscultated  Gastrointestinal: Soft; NTND w/out rebound tenderness or guarding; bowel sounds normal  Extremities: WWP; trace non-pitting edema or cyanosis; radial/pedal pulses palpable  Neurological:  AAOx3, CNII-XII grossly intact; no obvious focal deficits    MEDICATIONS  (STANDING):  amLODIPine   Tablet 5 milliGRAM(s) Oral daily  aspirin enteric coated 81 milliGRAM(s) Oral daily  atorvastatin 80 milliGRAM(s) Oral at bedtime  chlorhexidine 0.12% Liquid 5 milliLiter(s) Swish and Spit once  heparin  Infusion 700 Unit(s)/Hr (7 mL/Hr) IV Continuous <Continuous>  isosorbide   mononitrate ER Tablet (IMDUR) 120 milliGRAM(s) Oral daily  metoprolol succinate ER 50 milliGRAM(s) Oral daily  ranolazine 500 milliGRAM(s) Oral two times a day    MEDICATIONS  (PRN):      LABS                        12.7   5.4   )-----------( 293      ( 2018 05:50 )             39.0     04-05    140  |  101  |  15  ----------------------------<  98  4.8   |  27  |  0.91    Ca    10.2      2018 05:50  Mg     2.0     04-05    TPro  7.8  /  Alb  4.2  /  TBili  0.3  /  DBili  x   /  AST  26  /  ALT  14  /  AlkPhos  68  04-05    PT/INR - ( 2018 05:50 )   PT: 13.1 sec;   INR: 1.18          PTT - ( 2018 05:50 )  PTT:77.7 sec  Urinalysis Basic - ( 2018 23:10 )    Color: Yellow / Appearance: Clear / S.010 / pH: x  Gluc: x / Ketone: NEGATIVE  / Bili: Negative / Urobili: 0.2 E.U./dL   Blood: x / Protein: NEGATIVE mg/dL / Nitrite: NEGATIVE   Leuk Esterase: NEGATIVE / RBC: x / WBC x   Sq Epi: x / Non Sq Epi: x / Bacteria: x      CARDIAC MARKERS ( 2018 12:00 )  x     / <0.01 ng/mL / 345 U/L / x     / 5.6 ng/mL      IMAGING/EKG/ETC  EKG: Sinus bradycardia, Normal Axis at -30, Poor R-wave progression, borderline Left-Atrial Enlargement  CXR: Clear CXR without signs of pulmonary vascular congestion  ECHO: LVH, EF>65%, Normal Wall motion, Trace MR and TR.   Carotid U/S: No carotid Artery Stenosis  CTH: Patchy areas of nonspecific changes most likely chronic ischemic microangiopathy  PFTs: Showed no sings of Restrictive or Obstructive pattern

## 2018-04-05 NOTE — PROGRESS NOTE ADULT - PROBLEM SELECTOR PLAN 6
F: No Fluids  E: Replete K>4, Mg>2  N: Currently NPO pending procedure  A: Bed Rest pending OR  L: Peripheral IV

## 2018-04-06 LAB
ALBUMIN SERPL ELPH-MCNC: 4.5 G/DL — SIGNIFICANT CHANGE UP (ref 3.3–5)
ALP SERPL-CCNC: 54 U/L — SIGNIFICANT CHANGE UP (ref 40–120)
ALT FLD-CCNC: 13 U/L — SIGNIFICANT CHANGE UP (ref 10–45)
ANION GAP SERPL CALC-SCNC: 13 MMOL/L — SIGNIFICANT CHANGE UP (ref 5–17)
ANION GAP SERPL CALC-SCNC: 14 MMOL/L — SIGNIFICANT CHANGE UP (ref 5–17)
ANION GAP SERPL CALC-SCNC: 15 MMOL/L — SIGNIFICANT CHANGE UP (ref 5–17)
APTT BLD: 27.5 SEC — SIGNIFICANT CHANGE UP (ref 27.5–37.4)
APTT BLD: 30.7 SEC — SIGNIFICANT CHANGE UP (ref 27.5–37.4)
AST SERPL-CCNC: 35 U/L — SIGNIFICANT CHANGE UP (ref 10–40)
BASE EXCESS BLDA CALC-SCNC: -1.4 MMOL/L — SIGNIFICANT CHANGE UP (ref -2–3)
BILIRUB SERPL-MCNC: 0.2 MG/DL — SIGNIFICANT CHANGE UP (ref 0.2–1.2)
BUN SERPL-MCNC: 14 MG/DL — SIGNIFICANT CHANGE UP (ref 7–23)
BUN SERPL-MCNC: 14 MG/DL — SIGNIFICANT CHANGE UP (ref 7–23)
BUN SERPL-MCNC: 16 MG/DL — SIGNIFICANT CHANGE UP (ref 7–23)
CALCIUM SERPL-MCNC: 8.4 MG/DL — SIGNIFICANT CHANGE UP (ref 8.4–10.5)
CALCIUM SERPL-MCNC: 8.8 MG/DL — SIGNIFICANT CHANGE UP (ref 8.4–10.5)
CALCIUM SERPL-MCNC: 9.1 MG/DL — SIGNIFICANT CHANGE UP (ref 8.4–10.5)
CHLORIDE SERPL-SCNC: 94 MMOL/L — LOW (ref 96–108)
CHLORIDE SERPL-SCNC: 96 MMOL/L — SIGNIFICANT CHANGE UP (ref 96–108)
CHLORIDE SERPL-SCNC: 96 MMOL/L — SIGNIFICANT CHANGE UP (ref 96–108)
CO2 SERPL-SCNC: 22 MMOL/L — SIGNIFICANT CHANGE UP (ref 22–31)
CO2 SERPL-SCNC: 22 MMOL/L — SIGNIFICANT CHANGE UP (ref 22–31)
CO2 SERPL-SCNC: 24 MMOL/L — SIGNIFICANT CHANGE UP (ref 22–31)
CREAT SERPL-MCNC: 0.76 MG/DL — SIGNIFICANT CHANGE UP (ref 0.5–1.3)
CREAT SERPL-MCNC: 0.78 MG/DL — SIGNIFICANT CHANGE UP (ref 0.5–1.3)
CREAT SERPL-MCNC: 0.85 MG/DL — SIGNIFICANT CHANGE UP (ref 0.5–1.3)
GAS PNL BLDA: SIGNIFICANT CHANGE UP
GLUCOSE BLDC GLUCOMTR-MCNC: 102 MG/DL — HIGH (ref 70–99)
GLUCOSE BLDC GLUCOMTR-MCNC: 105 MG/DL — HIGH (ref 70–99)
GLUCOSE BLDC GLUCOMTR-MCNC: 111 MG/DL — HIGH (ref 70–99)
GLUCOSE BLDC GLUCOMTR-MCNC: 113 MG/DL — HIGH (ref 70–99)
GLUCOSE BLDC GLUCOMTR-MCNC: 117 MG/DL — HIGH (ref 70–99)
GLUCOSE BLDC GLUCOMTR-MCNC: 126 MG/DL — HIGH (ref 70–99)
GLUCOSE BLDC GLUCOMTR-MCNC: 129 MG/DL — HIGH (ref 70–99)
GLUCOSE BLDC GLUCOMTR-MCNC: 134 MG/DL — HIGH (ref 70–99)
GLUCOSE BLDC GLUCOMTR-MCNC: 146 MG/DL — HIGH (ref 70–99)
GLUCOSE BLDC GLUCOMTR-MCNC: 87 MG/DL — SIGNIFICANT CHANGE UP (ref 70–99)
GLUCOSE SERPL-MCNC: 117 MG/DL — HIGH (ref 70–99)
GLUCOSE SERPL-MCNC: 157 MG/DL — HIGH (ref 70–99)
GLUCOSE SERPL-MCNC: 163 MG/DL — HIGH (ref 70–99)
HCO3 BLDA-SCNC: 23 MMOL/L — SIGNIFICANT CHANGE UP (ref 21–28)
HCT VFR BLD CALC: 31.5 % — LOW (ref 34.5–45)
HCT VFR BLD CALC: 32.2 % — LOW (ref 34.5–45)
HCT VFR BLD CALC: 33.9 % — LOW (ref 34.5–45)
HGB BLD-MCNC: 10.4 G/DL — LOW (ref 11.5–15.5)
HGB BLD-MCNC: 10.5 G/DL — LOW (ref 11.5–15.5)
HGB BLD-MCNC: 11.1 G/DL — LOW (ref 11.5–15.5)
INR BLD: 1.24 — HIGH (ref 0.88–1.16)
INR BLD: 1.46 — HIGH (ref 0.88–1.16)
LACTATE SERPL-SCNC: 1.4 MMOL/L — SIGNIFICANT CHANGE UP (ref 0.5–2)
LACTATE SERPL-SCNC: 2.7 MMOL/L — HIGH (ref 0.5–2)
MAGNESIUM SERPL-MCNC: 2.1 MG/DL — SIGNIFICANT CHANGE UP (ref 1.6–2.6)
MAGNESIUM SERPL-MCNC: 2.1 MG/DL — SIGNIFICANT CHANGE UP (ref 1.6–2.6)
MAGNESIUM SERPL-MCNC: 2.7 MG/DL — HIGH (ref 1.6–2.6)
MCHC RBC-ENTMCNC: 26 PG — LOW (ref 27–34)
MCHC RBC-ENTMCNC: 26.4 PG — LOW (ref 27–34)
MCHC RBC-ENTMCNC: 26.8 PG — LOW (ref 27–34)
MCHC RBC-ENTMCNC: 32.6 G/DL — SIGNIFICANT CHANGE UP (ref 32–36)
MCHC RBC-ENTMCNC: 32.7 G/DL — SIGNIFICANT CHANGE UP (ref 32–36)
MCHC RBC-ENTMCNC: 33 G/DL — SIGNIFICANT CHANGE UP (ref 32–36)
MCV RBC AUTO: 79.4 FL — LOW (ref 80–100)
MCV RBC AUTO: 81.1 FL — SIGNIFICANT CHANGE UP (ref 80–100)
MCV RBC AUTO: 81.2 FL — SIGNIFICANT CHANGE UP (ref 80–100)
PCO2 BLDA: 37 MMHG — SIGNIFICANT CHANGE UP (ref 32–45)
PH BLDA: 7.41 — SIGNIFICANT CHANGE UP (ref 7.35–7.45)
PHOSPHATE SERPL-MCNC: 3 MG/DL — SIGNIFICANT CHANGE UP (ref 2.5–4.5)
PHOSPHATE SERPL-MCNC: 3.5 MG/DL — SIGNIFICANT CHANGE UP (ref 2.5–4.5)
PLATELET # BLD AUTO: 229 K/UL — SIGNIFICANT CHANGE UP (ref 150–400)
PLATELET # BLD AUTO: 251 K/UL — SIGNIFICANT CHANGE UP (ref 150–400)
PLATELET # BLD AUTO: 281 K/UL — SIGNIFICANT CHANGE UP (ref 150–400)
PO2 BLDA: 66 MMHG — LOW (ref 83–108)
POTASSIUM SERPL-MCNC: 3.1 MMOL/L — LOW (ref 3.5–5.3)
POTASSIUM SERPL-MCNC: 3.7 MMOL/L — SIGNIFICANT CHANGE UP (ref 3.5–5.3)
POTASSIUM SERPL-MCNC: 5.2 MMOL/L — SIGNIFICANT CHANGE UP (ref 3.5–5.3)
POTASSIUM SERPL-SCNC: 3.1 MMOL/L — LOW (ref 3.5–5.3)
POTASSIUM SERPL-SCNC: 3.7 MMOL/L — SIGNIFICANT CHANGE UP (ref 3.5–5.3)
POTASSIUM SERPL-SCNC: 5.2 MMOL/L — SIGNIFICANT CHANGE UP (ref 3.5–5.3)
PROT SERPL-MCNC: 7.6 G/DL — SIGNIFICANT CHANGE UP (ref 6–8.3)
PROTHROM AB SERPL-ACNC: 13.8 SEC — HIGH (ref 9.8–12.7)
PROTHROM AB SERPL-ACNC: 16.3 SEC — HIGH (ref 9.8–12.7)
RBC # BLD: 3.88 M/UL — SIGNIFICANT CHANGE UP (ref 3.8–5.2)
RBC # BLD: 3.97 M/UL — SIGNIFICANT CHANGE UP (ref 3.8–5.2)
RBC # BLD: 4.27 M/UL — SIGNIFICANT CHANGE UP (ref 3.8–5.2)
RBC # FLD: 13 % — SIGNIFICANT CHANGE UP (ref 10.3–16.9)
RBC # FLD: 13.5 % — SIGNIFICANT CHANGE UP (ref 10.3–16.9)
RBC # FLD: 13.6 % — SIGNIFICANT CHANGE UP (ref 10.3–16.9)
SAO2 % BLDA: 92 % — LOW (ref 95–100)
SODIUM SERPL-SCNC: 131 MMOL/L — LOW (ref 135–145)
SODIUM SERPL-SCNC: 131 MMOL/L — LOW (ref 135–145)
SODIUM SERPL-SCNC: 134 MMOL/L — LOW (ref 135–145)
WBC # BLD: 8.2 K/UL — SIGNIFICANT CHANGE UP (ref 3.8–10.5)
WBC # BLD: 8.9 K/UL — SIGNIFICANT CHANGE UP (ref 3.8–10.5)
WBC # BLD: 9.1 K/UL — SIGNIFICANT CHANGE UP (ref 3.8–10.5)
WBC # FLD AUTO: 8.2 K/UL — SIGNIFICANT CHANGE UP (ref 3.8–10.5)
WBC # FLD AUTO: 8.9 K/UL — SIGNIFICANT CHANGE UP (ref 3.8–10.5)
WBC # FLD AUTO: 9.1 K/UL — SIGNIFICANT CHANGE UP (ref 3.8–10.5)

## 2018-04-06 PROCEDURE — 71045 X-RAY EXAM CHEST 1 VIEW: CPT | Mod: 26,76

## 2018-04-06 RX ORDER — ACETAMINOPHEN 500 MG
650 TABLET ORAL EVERY 6 HOURS
Qty: 0 | Refills: 0 | Status: DISCONTINUED | OUTPATIENT
Start: 2018-04-06 | End: 2018-04-12

## 2018-04-06 RX ORDER — ALBUMIN HUMAN 25 %
250 VIAL (ML) INTRAVENOUS ONCE
Qty: 0 | Refills: 0 | Status: COMPLETED | OUTPATIENT
Start: 2018-04-06 | End: 2018-04-06

## 2018-04-06 RX ORDER — KETOROLAC TROMETHAMINE 30 MG/ML
15 SYRINGE (ML) INJECTION ONCE
Qty: 0 | Refills: 0 | Status: DISCONTINUED | OUTPATIENT
Start: 2018-04-06 | End: 2018-04-06

## 2018-04-06 RX ORDER — SODIUM CHLORIDE 9 MG/ML
1000 INJECTION, SOLUTION INTRAVENOUS
Qty: 0 | Refills: 0 | Status: DISCONTINUED | OUTPATIENT
Start: 2018-04-06 | End: 2018-04-08

## 2018-04-06 RX ORDER — DEXTROSE 50 % IN WATER 50 %
1 SYRINGE (ML) INTRAVENOUS ONCE
Qty: 0 | Refills: 0 | Status: DISCONTINUED | OUTPATIENT
Start: 2018-04-06 | End: 2018-04-08

## 2018-04-06 RX ORDER — LABETALOL HCL 100 MG
10 TABLET ORAL ONCE
Qty: 0 | Refills: 0 | Status: COMPLETED | OUTPATIENT
Start: 2018-04-06 | End: 2018-04-06

## 2018-04-06 RX ORDER — SODIUM CHLORIDE 9 MG/ML
3 INJECTION INTRAMUSCULAR; INTRAVENOUS; SUBCUTANEOUS EVERY 8 HOURS
Qty: 0 | Refills: 0 | Status: DISCONTINUED | OUTPATIENT
Start: 2018-04-06 | End: 2018-04-12

## 2018-04-06 RX ORDER — LIDOCAINE 4 G/100G
1 CREAM TOPICAL DAILY
Qty: 0 | Refills: 0 | Status: DISCONTINUED | OUTPATIENT
Start: 2018-04-06 | End: 2018-04-12

## 2018-04-06 RX ORDER — DEXTROSE 50 % IN WATER 50 %
12.5 SYRINGE (ML) INTRAVENOUS ONCE
Qty: 0 | Refills: 0 | Status: DISCONTINUED | OUTPATIENT
Start: 2018-04-06 | End: 2018-04-08

## 2018-04-06 RX ORDER — POTASSIUM CHLORIDE 20 MEQ
20 PACKET (EA) ORAL
Qty: 0 | Refills: 0 | Status: DISCONTINUED | OUTPATIENT
Start: 2018-04-06 | End: 2018-04-06

## 2018-04-06 RX ORDER — METOPROLOL TARTRATE 50 MG
50 TABLET ORAL EVERY 6 HOURS
Qty: 0 | Refills: 0 | Status: DISCONTINUED | OUTPATIENT
Start: 2018-04-06 | End: 2018-04-07

## 2018-04-06 RX ORDER — FUROSEMIDE 40 MG
20 TABLET ORAL ONCE
Qty: 0 | Refills: 0 | Status: COMPLETED | OUTPATIENT
Start: 2018-04-06 | End: 2018-04-06

## 2018-04-06 RX ORDER — HYDROMORPHONE HYDROCHLORIDE 2 MG/ML
0.5 INJECTION INTRAMUSCULAR; INTRAVENOUS; SUBCUTANEOUS ONCE
Qty: 0 | Refills: 0 | Status: DISCONTINUED | OUTPATIENT
Start: 2018-04-06 | End: 2018-04-06

## 2018-04-06 RX ORDER — HYDROMORPHONE HYDROCHLORIDE 2 MG/ML
0.25 INJECTION INTRAMUSCULAR; INTRAVENOUS; SUBCUTANEOUS ONCE
Qty: 0 | Refills: 0 | Status: DISCONTINUED | OUTPATIENT
Start: 2018-04-06 | End: 2018-04-06

## 2018-04-06 RX ORDER — OXYCODONE AND ACETAMINOPHEN 5; 325 MG/1; MG/1
1 TABLET ORAL EVERY 6 HOURS
Qty: 0 | Refills: 0 | Status: DISCONTINUED | OUTPATIENT
Start: 2018-04-06 | End: 2018-04-10

## 2018-04-06 RX ORDER — ACETAMINOPHEN 500 MG
1000 TABLET ORAL ONCE
Qty: 0 | Refills: 0 | Status: COMPLETED | OUTPATIENT
Start: 2018-04-06 | End: 2018-04-06

## 2018-04-06 RX ORDER — KETOROLAC TROMETHAMINE 30 MG/ML
15 SYRINGE (ML) INJECTION EVERY 8 HOURS
Qty: 0 | Refills: 0 | Status: DISCONTINUED | OUTPATIENT
Start: 2018-04-06 | End: 2018-04-06

## 2018-04-06 RX ORDER — METOCLOPRAMIDE HCL 10 MG
10 TABLET ORAL ONCE
Qty: 0 | Refills: 0 | Status: COMPLETED | OUTPATIENT
Start: 2018-04-06 | End: 2018-04-06

## 2018-04-06 RX ORDER — GLUCAGON INJECTION, SOLUTION 0.5 MG/.1ML
1 INJECTION, SOLUTION SUBCUTANEOUS ONCE
Qty: 0 | Refills: 0 | Status: DISCONTINUED | OUTPATIENT
Start: 2018-04-06 | End: 2018-04-08

## 2018-04-06 RX ORDER — SENNA PLUS 8.6 MG/1
2 TABLET ORAL AT BEDTIME
Qty: 0 | Refills: 0 | Status: DISCONTINUED | OUTPATIENT
Start: 2018-04-06 | End: 2018-04-12

## 2018-04-06 RX ORDER — POTASSIUM CHLORIDE 20 MEQ
20 PACKET (EA) ORAL
Qty: 0 | Refills: 0 | Status: COMPLETED | OUTPATIENT
Start: 2018-04-06 | End: 2018-04-06

## 2018-04-06 RX ORDER — OXYCODONE AND ACETAMINOPHEN 5; 325 MG/1; MG/1
2 TABLET ORAL EVERY 6 HOURS
Qty: 0 | Refills: 0 | Status: DISCONTINUED | OUTPATIENT
Start: 2018-04-06 | End: 2018-04-10

## 2018-04-06 RX ORDER — DOCUSATE SODIUM 100 MG
100 CAPSULE ORAL THREE TIMES A DAY
Qty: 0 | Refills: 0 | Status: DISCONTINUED | OUTPATIENT
Start: 2018-04-06 | End: 2018-04-06

## 2018-04-06 RX ORDER — DEXTROSE 50 % IN WATER 50 %
25 SYRINGE (ML) INTRAVENOUS ONCE
Qty: 0 | Refills: 0 | Status: DISCONTINUED | OUTPATIENT
Start: 2018-04-06 | End: 2018-04-08

## 2018-04-06 RX ORDER — METOPROLOL TARTRATE 50 MG
25 TABLET ORAL EVERY 6 HOURS
Qty: 0 | Refills: 0 | Status: DISCONTINUED | OUTPATIENT
Start: 2018-04-06 | End: 2018-04-06

## 2018-04-06 RX ORDER — HYDRALAZINE HCL 50 MG
5 TABLET ORAL ONCE
Qty: 0 | Refills: 0 | Status: COMPLETED | OUTPATIENT
Start: 2018-04-06 | End: 2018-04-06

## 2018-04-06 RX ORDER — ONDANSETRON 8 MG/1
8 TABLET, FILM COATED ORAL ONCE
Qty: 0 | Refills: 0 | Status: COMPLETED | OUTPATIENT
Start: 2018-04-06 | End: 2018-04-06

## 2018-04-06 RX ORDER — AMLODIPINE BESYLATE 2.5 MG/1
2.5 TABLET ORAL DAILY
Qty: 0 | Refills: 0 | Status: DISCONTINUED | OUTPATIENT
Start: 2018-04-06 | End: 2018-04-06

## 2018-04-06 RX ORDER — DOCUSATE SODIUM 100 MG
100 CAPSULE ORAL THREE TIMES A DAY
Qty: 0 | Refills: 0 | Status: DISCONTINUED | OUTPATIENT
Start: 2018-04-06 | End: 2018-04-12

## 2018-04-06 RX ORDER — INSULIN LISPRO 100/ML
VIAL (ML) SUBCUTANEOUS
Qty: 0 | Refills: 0 | Status: DISCONTINUED | OUTPATIENT
Start: 2018-04-06 | End: 2018-04-08

## 2018-04-06 RX ORDER — PANTOPRAZOLE SODIUM 20 MG/1
40 TABLET, DELAYED RELEASE ORAL
Qty: 0 | Refills: 0 | Status: DISCONTINUED | OUTPATIENT
Start: 2018-04-06 | End: 2018-04-12

## 2018-04-06 RX ORDER — AMLODIPINE BESYLATE 2.5 MG/1
2.5 TABLET ORAL DAILY
Qty: 0 | Refills: 0 | Status: DISCONTINUED | OUTPATIENT
Start: 2018-04-07 | End: 2018-04-07

## 2018-04-06 RX ORDER — DEXTROSE 50 % IN WATER 50 %
25 SYRINGE (ML) INTRAVENOUS ONCE
Qty: 0 | Refills: 0 | Status: DISCONTINUED | OUTPATIENT
Start: 2018-04-06 | End: 2018-04-06

## 2018-04-06 RX ADMIN — HYDROMORPHONE HYDROCHLORIDE 0.5 MILLIGRAM(S): 2 INJECTION INTRAMUSCULAR; INTRAVENOUS; SUBCUTANEOUS at 04:45

## 2018-04-06 RX ADMIN — Medication 20 MILLIGRAM(S): at 10:47

## 2018-04-06 RX ADMIN — SENNA PLUS 2 TABLET(S): 8.6 TABLET ORAL at 21:22

## 2018-04-06 RX ADMIN — CLOPIDOGREL BISULFATE 75 MILLIGRAM(S): 75 TABLET, FILM COATED ORAL at 11:59

## 2018-04-06 RX ADMIN — Medication 100 MILLIGRAM(S): at 21:23

## 2018-04-06 RX ADMIN — Medication 100 MILLIEQUIVALENT(S): at 14:23

## 2018-04-06 RX ADMIN — OXYCODONE AND ACETAMINOPHEN 2 TABLET(S): 5; 325 TABLET ORAL at 13:00

## 2018-04-06 RX ADMIN — Medication 125 MILLILITER(S): at 04:46

## 2018-04-06 RX ADMIN — Medication 15 MILLIGRAM(S): at 09:11

## 2018-04-06 RX ADMIN — Medication 25 MILLIGRAM(S): at 05:26

## 2018-04-06 RX ADMIN — Medication 15 MILLIGRAM(S): at 09:46

## 2018-04-06 RX ADMIN — Medication 10 MILLIGRAM(S): at 09:46

## 2018-04-06 RX ADMIN — HYDROMORPHONE HYDROCHLORIDE 0.25 MILLIGRAM(S): 2 INJECTION INTRAMUSCULAR; INTRAVENOUS; SUBCUTANEOUS at 14:17

## 2018-04-06 RX ADMIN — Medication 81 MILLIGRAM(S): at 11:59

## 2018-04-06 RX ADMIN — Medication 125 MILLILITER(S): at 04:15

## 2018-04-06 RX ADMIN — SODIUM CHLORIDE 3 MILLILITER(S): 9 INJECTION INTRAMUSCULAR; INTRAVENOUS; SUBCUTANEOUS at 21:23

## 2018-04-06 RX ADMIN — Medication 100 MILLIGRAM(S): at 14:28

## 2018-04-06 RX ADMIN — AMLODIPINE BESYLATE 2.5 MILLIGRAM(S): 2.5 TABLET ORAL at 12:08

## 2018-04-06 RX ADMIN — HEPARIN SODIUM 5000 UNIT(S): 5000 INJECTION INTRAVENOUS; SUBCUTANEOUS at 21:22

## 2018-04-06 RX ADMIN — Medication 15 MILLIGRAM(S): at 09:47

## 2018-04-06 RX ADMIN — Medication 10 MILLIGRAM(S): at 09:09

## 2018-04-06 RX ADMIN — Medication 100 MILLIEQUIVALENT(S): at 15:33

## 2018-04-06 RX ADMIN — Medication 5 MILLIGRAM(S): at 12:48

## 2018-04-06 RX ADMIN — LIDOCAINE 1 PATCH: 4 CREAM TOPICAL at 12:47

## 2018-04-06 RX ADMIN — HEPARIN SODIUM 5000 UNIT(S): 5000 INJECTION INTRAVENOUS; SUBCUTANEOUS at 05:26

## 2018-04-06 RX ADMIN — Medication 400 MILLIGRAM(S): at 14:27

## 2018-04-06 RX ADMIN — Medication 1000 MILLIGRAM(S): at 14:00

## 2018-04-06 RX ADMIN — OXYCODONE AND ACETAMINOPHEN 2 TABLET(S): 5; 325 TABLET ORAL at 14:00

## 2018-04-06 RX ADMIN — Medication 10 MILLIGRAM(S): at 11:59

## 2018-04-06 RX ADMIN — Medication 25 MILLIGRAM(S): at 11:59

## 2018-04-06 RX ADMIN — Medication 100 MILLIGRAM(S): at 21:22

## 2018-04-06 RX ADMIN — PANTOPRAZOLE SODIUM 40 MILLIGRAM(S): 20 TABLET, DELAYED RELEASE ORAL at 12:08

## 2018-04-06 RX ADMIN — HYDROMORPHONE HYDROCHLORIDE 0.25 MILLIGRAM(S): 2 INJECTION INTRAMUSCULAR; INTRAVENOUS; SUBCUTANEOUS at 15:00

## 2018-04-06 RX ADMIN — Medication 15 MILLIGRAM(S): at 09:09

## 2018-04-06 RX ADMIN — Medication 100 MILLIEQUIVALENT(S): at 12:45

## 2018-04-06 RX ADMIN — Medication 100 MILLIGRAM(S): at 05:25

## 2018-04-06 RX ADMIN — HYDROMORPHONE HYDROCHLORIDE 0.5 MILLIGRAM(S): 2 INJECTION INTRAMUSCULAR; INTRAVENOUS; SUBCUTANEOUS at 05:00

## 2018-04-06 RX ADMIN — HEPARIN SODIUM 5000 UNIT(S): 5000 INJECTION INTRAVENOUS; SUBCUTANEOUS at 14:27

## 2018-04-06 RX ADMIN — Medication 1000 MILLIGRAM(S): at 05:00

## 2018-04-06 RX ADMIN — ONDANSETRON 8 MILLIGRAM(S): 8 TABLET, FILM COATED ORAL at 06:20

## 2018-04-06 RX ADMIN — HYDROMORPHONE HYDROCHLORIDE 0.5 MILLIGRAM(S): 2 INJECTION INTRAMUSCULAR; INTRAVENOUS; SUBCUTANEOUS at 23:45

## 2018-04-06 RX ADMIN — Medication 100 MILLIEQUIVALENT(S): at 12:00

## 2018-04-06 RX ADMIN — Medication 400 MILLIGRAM(S): at 02:48

## 2018-04-06 NOTE — PHYSICAL THERAPY INITIAL EVALUATION ADULT - ACTIVE RANGE OF MOTION EXAMINATION, REHAB EVAL
bilateral  lower extremity Active ROM was WFL (within functional limits)/Left shoulder flexion/abduction greater than or equal to 90 degrees not tested secondary to thoracotomy precautions/bilateral upper extremity Active ROM was WFL (within functional limits)

## 2018-04-06 NOTE — PHYSICAL THERAPY INITIAL EVALUATION ADULT - GAIT DEVIATIONS NOTED, PT EVAL
decreased arron/fairly steady gait, no LOB noted, no complaints of dizziness/SOB (ambulated on 6LO2NC, SpO2=94% throughout); **gait distance limited 2/2 complaints of increased incisional pain and "legs feeling heavy"/decreased step length

## 2018-04-06 NOTE — PHYSICAL THERAPY INITIAL EVALUATION ADULT - ADDITIONAL COMMENTS
Patient reports independence with all ADLs/IADLs prior to admission. No HHA. Denies history of mechanical falls prior to admission.

## 2018-04-06 NOTE — PHYSICAL THERAPY INITIAL EVALUATION ADULT - GENERAL OBSERVATIONS, REHAB EVAL
Chart reviewed. IE Completed. Patient with 6/10 surgical pain at rest (GIOVANA Dalton aware), however agreeable to PT. Patient received OOB in chair, NAD, +tele, +6LO2NC, +central TLC, +(L)radial a-line, +1 CT to wall suction (cleared for ambulation on self seal by JONNA Kaur), +silva,  at bedside, GIOVANA Dalton cleared patient for treatment.

## 2018-04-06 NOTE — PROGRESS NOTE ADULT - ASSESSMENT
67 year old F strong FHX of CAD w/ PMHx of HTN, Hyperlipidemia, known CAD s/p PCI CHENG mLAD (99%) 7/16/13 at [facility] who presented to cardiologist Dr. Cortes for recurrent exertional chest pain described as a tightness radiating to neck and ear, 8/10 severity associated w/ palpitations and which is relieved with rest and deep breath. In addition pt endorses SANTOS upon walking one city block and is subsequently resolved with 10 minutes of rest. Pt denies syncope, dizziness, PND/orthopnea or LE edema. NST 12/21/2017: EF: 67%, J point depression 0.5 to 1mm in inferolateral leads w/ upsloping ST segment, normal myocardial perfusion however patient experienced chest pain at peak exercise which resolved during recovery. Stress test terminated due to chest pain. 67 year old F strong FHX of CAD w/ PMHx of HTN, HLD, known CAD s/p PCI CHENG mLAD (99%) 7/16/13 at [facility] who presented to cardiologist Dr. Cortes for recurrent chest pain and SANTOS. Patient subsequently underwent abnormal NST and was referred for elective cardiac cath today. Cardiac cath revealed LM 70%, mLAD 50%, oLCx 70%, RCA 50% EF 60%. CT Surgery Dr. Murrieta consulted for possible surgical intervention and hybrid procedure.  On 4/5/18 patient underwent uncomplicated MIDCAB,  arrived to CT ICU extubated. Uncomplicated ICU course transferred to  POD#1.     A/P:  Neurovascular: No delirium. Pain well controlled with current regimen.  -Cont tylenol/percocet prn pain, lidoderm patch    Cardiovascular: Hemodynamically stable. HR controlled.  -Hx HTN, HLD, CAD s/p stents, s/p MIDCAB ef nl  -continue asa 81mg daily, plavix 75mg daily, metoprolol 50mg q6, statin  -monitor HR/BP/tele    Respiratory: 02 Sat = 96% on RA.  -Encourage ambulation, C+DB and Use of IS 10x / hr while awake.  -CXR stable, f/u AM CXR    GI: Stable.  -coont protonix for GI PPX.  -cont bowel regimen   -PO Diet.    Renal / : BUN/cr 14/0.76  -Monitor renal function.  -Monitor I/O's.    Endocrine:    -A1c 5.9- continue to monitor FS and ISS  -TSH WNL    Hematologic: H&H 10/31  -f/u AM CBC.    ID:afebrile, WBC 8.9  -complete periop abx  -Observe for SIRS/Sepsis Syndrome.    Prophylaxis:  -DVT prophylaxis with 5000 SubQ Heparin q8h.  -SCD's    Disposition:  -Home when ready 67 year old F strong FHX of CAD w/ PMHx of HTN, HLD, known CAD s/p PCI CHENG mLAD (99%) 7/16/13 at [facility] who presented to cardiologist Dr. Cortes for recurrent chest pain and SANTOS. Patient subsequently underwent abnormal NST and was referred for elective cardiac cath today. Cardiac cath revealed LM 70%, mLAD 50%, oLCx 70%, RCA 50% EF 60%. CT Surgery Dr. Murrieta consulted for possible surgical intervention and hybrid procedure.  On 4/5/18 patient underwent uncomplicated MIDCAB,  arrived to CT ICU extubated. Uncomplicated ICU course transferred to  POD#1.     A/P:  Neurovascular: No delirium. Pain well controlled with current regimen.  -Cont tylenol/percocet prn pain, lidoderm patch    Cardiovascular: Hemodynamically stable. HR controlled.  -Hx HTN, HLD, CAD s/p stents, s/p MIDCAB ef nl  -continue asa 81mg daily, plavix 75mg daily, metoprolol 50mg q6, statin  -monitor HR/BP/tele    Respiratory: 02 Sat = 96% on RA.  -Encourage ambulation, C+DB and Use of IS 10x / hr while awake.  -CXR stable, f/u AM CXR    GI: Stable.  -coont protonix for GI PPX.  -cont bowel regimen   -PO Diet.    Renal / : BUN/cr 14/0.76  -Monitor renal function.  -Monitor I/O's.  -K this AM 3.1, K repleted IV in unit, f/u BMP    Endocrine:    -A1c 5.9- continue to monitor FS and ISS  -TSH WNL    Hematologic: H&H 10/31  -f/u AM CBC.    ID:afebrile, WBC 8.9  -complete periop abx  -Observe for SIRS/Sepsis Syndrome.    Prophylaxis:  -DVT prophylaxis with 5000 SubQ Heparin q8h.  -SCD's    Disposition:  -Home when ready 67 year old F strong FHX of CAD w/ PMHx of HTN, HLD, known CAD s/p PCI CHENG mLAD (99%) 7/16/13 at [facility] who presented to cardiologist Dr. Cortes for recurrent chest pain and SANTOS. Patient subsequently underwent abnormal NST and was referred for elective cardiac cath today. Cardiac cath revealed LM 70%, mLAD 50%, oLCx 70%, RCA 50% EF 60%. CT Surgery Dr. Murrieta consulted for possible surgical intervention and hybrid procedure.  On 4/5/18 patient underwent uncomplicated MIDCAB,  arrived to CT ICU extubated. Uncomplicated ICU course transferred to 9 POD#1.     A/P:  Neurovascular: No delirium. Pain well controlled with current regimen.  -Cont tylenol/percocet prn pain, lidoderm patch    Cardiovascular: Hemodynamically stable. HR controlled.  -Hx HTN, HLD, CAD s/p stents, s/p MIDCAB ef nl. Per Dr. Murrieta will only have PCI in future if experiences chest pain from residual Cx disease.   -continue asa 81mg daily, plavix 75mg daily, metoprolol 50mg q6, statin  -monitor HR/BP/tele    Respiratory: 02 Sat = 96% on RA.  -Encourage ambulation, C+DB and Use of IS 10x / hr while awake.  -CXR stable, f/u AM CXR    GI: Stable.  -coont protonix for GI PPX.  -cont bowel regimen   -PO Diet.    Renal / : BUN/cr 14/0.76  -Monitor renal function.  -Monitor I/O's.  -K this AM 3.1, K repleted IV in unit, f/u BMP    Endocrine:    -A1c 5.9- continue to monitor FS and ISS  -TSH WNL    Hematologic: H&H 10/31  -f/u AM CBC.    ID:afebrile, WBC 8.9  -complete periop abx  -Observe for SIRS/Sepsis Syndrome.    Prophylaxis:  -DVT prophylaxis with 5000 SubQ Heparin q8h.  -SCD's    Disposition:  -Home when ready

## 2018-04-06 NOTE — DIETITIAN INITIAL EVALUATION ADULT. - ENERGY NEEDS
IBW 52.3Kg  %%  BMI 32.3    Utilized IBW to calculate needs, pt >120% of IBW. Adjusted for post-op MIDCAB.

## 2018-04-06 NOTE — PROGRESS NOTE ADULT - SUBJECTIVE AND OBJECTIVE BOX
Transfer from E    Operation / Date: MIDCAB EF nl      SUBJECTIVE ASSESSMENT:  67y Female         Vital Signs Last 24 Hrs  T(C): 37.2 (06 Apr 2018 13:34), Max: 37.2 (06 Apr 2018 13:34)  T(F): 98.9 (06 Apr 2018 13:34), Max: 98.9 (06 Apr 2018 13:34)  HR: 58 (06 Apr 2018 15:00) (58 - 92)  BP: 117/59 (06 Apr 2018 15:00) (117/59 - 174/63)  BP(mean): 84 (06 Apr 2018 15:00) (84 - 102)  RR: 20 (06 Apr 2018 10:20) (10 - 22)  SpO2: 96% (06 Apr 2018 15:00) (92% - 100%)  I&O's Detail    05 Apr 2018 07:01  -  06 Apr 2018 07:00  --------------------------------------------------------  IN:    Albumin 5%  - 250 mL: 1000 mL    heparin Infusion: 28 mL    insulin Infusion: 19 mL    IV PiggyBack: 300 mL    lactated ringers.: 500 mL    Oral Fluid: 175 mL    sodium chloride 0.45%.: 160 mL  Total IN: 2182 mL    OUT:    Chest Tube: 220 mL    Indwelling Catheter - Urethral: 1305 mL    Voided: 400 mL  Total OUT: 1925 mL    Total NET: 257 mL      06 Apr 2018 07:01  -  06 Apr 2018 15:38  --------------------------------------------------------  IN:    IV PiggyBack: 450 mL    Oral Fluid: 240 mL    sodium chloride 0.45%.: 70 mL  Total IN: 760 mL    OUT:    Chest Tube: 50 mL    Indwelling Catheter - Urethral: 945 mL  Total OUT: 995 mL    Total NET: -235 mL          CHEST TUBE:  Yes/No. AIR LEAKS: Yes/No. Suction / H2O SEAL.   ANASTASIA DRAIN:  Yes/No.  EPICARDIAL WIRES: Yes/No.  TIE DOWNS: Yes/No.  GARY: Yes/No.    PHYSICAL EXAM:    General:     Neurological:    Cardiovascular:    Respiratory:    Gastrointestinal:    Extremities:    Vascular:    Incision Sites:    LABS:                        10.4   8.9   )-----------( 229      ( 06 Apr 2018 11:00 )             31.5       COUMADIN:  No    PT/INR - ( 06 Apr 2018 02:40 )   PT: 13.8 sec;   INR: 1.24          PTT - ( 06 Apr 2018 02:40 )  PTT:30.7 sec    04-06    131<L>  |  94<L>  |  14  ----------------------------<  163<H>  3.1<L>   |  22  |  0.76    Ca    8.4      06 Apr 2018 11:00  Phos  3.0     04-06  Mg     2.1     04-06    TPro  7.6  /  Alb  4.5  /  TBili  0.2  /  DBili  x   /  AST  35  /  ALT  13  /  AlkPhos  54  04-06          MEDICATIONS  (STANDING):  aspirin enteric coated 81 milliGRAM(s) Oral daily  ceFAZolin   IVPB 2000 milliGRAM(s) IV Intermittent every 8 hours  clopidogrel Tablet 75 milliGRAM(s) Oral daily  dextrose 5%. 1000 milliLiter(s) (50 mL/Hr) IV Continuous <Continuous>  dextrose 50% Injectable 12.5 Gram(s) IV Push once  dextrose 50% Injectable 25 Gram(s) IV Push once  docusate sodium 100 milliGRAM(s) Oral three times a day  heparin  Injectable 5000 Unit(s) SubCutaneous every 8 hours  insulin lispro (HumaLOG) corrective regimen sliding scale   SubCutaneous Before meals and at bedtime  lidocaine   Patch 1 Patch Transdermal daily  metoprolol tartrate 50 milliGRAM(s) Oral every 6 hours  pantoprazole    Tablet 40 milliGRAM(s) Oral before breakfast  senna 2 Tablet(s) Oral at bedtime  sodium chloride 0.45%. 1000 milliLiter(s) (10 mL/Hr) IV Continuous <Continuous>  sodium chloride 0.9% lock flush 3 milliLiter(s) IV Push every 8 hours    MEDICATIONS  (PRN):  acetaminophen   Tablet. 650 milliGRAM(s) Oral every 6 hours PRN Mild Pain (1 - 3)  dextrose Gel 1 Dose(s) Oral once PRN Blood Glucose LESS THAN 70 milliGRAM(s)/deciliter  glucagon  Injectable 1 milliGRAM(s) IntraMuscular once PRN Glucose LESS THAN 70 milligrams/deciliter  oxyCODONE    5 mG/acetaminophen 325 mG 2 Tablet(s) Oral every 6 hours PRN Severe Pain (7 - 10)        RADIOLOGY & ADDITIONAL TESTS:  CXR mild congestion, no obvious ptx/effusion/atelectasis Transfer from     Operation / Date: 4/5/18 MIDCAB EF nl      SUBJECTIVE ASSESSMENT:  67y Female seen and examined, c/o incisional pain.  Denies fever, chest pain, palpitations, SOB, abdominal pain, n/v.  Ambulating, using IS pulling 500cc, tolerating PO diet.          Vital Signs Last 24 Hrs  T(C): 37.2 (06 Apr 2018 13:34), Max: 37.2 (06 Apr 2018 13:34)  T(F): 98.9 (06 Apr 2018 13:34), Max: 98.9 (06 Apr 2018 13:34)  HR: 58 (06 Apr 2018 15:00) (58 - 92)  BP: 117/59 (06 Apr 2018 15:00) (117/59 - 174/63)  BP(mean): 84 (06 Apr 2018 15:00) (84 - 102)  RR: 20 (06 Apr 2018 10:20) (10 - 22)  SpO2: 96% (06 Apr 2018 15:00) (92% - 100%)  I&O's Detail    05 Apr 2018 07:01  -  06 Apr 2018 07:00  --------------------------------------------------------  IN:    Albumin 5%  - 250 mL: 1000 mL    heparin Infusion: 28 mL    insulin Infusion: 19 mL    IV PiggyBack: 300 mL    lactated ringers.: 500 mL    Oral Fluid: 175 mL    sodium chloride 0.45%.: 160 mL  Total IN: 2182 mL    OUT:    Chest Tube: 220 mL    Indwelling Catheter - Urethral: 1305 mL    Voided: 400 mL  Total OUT: 1925 mL    Total NET: 257 mL      06 Apr 2018 07:01  -  06 Apr 2018 15:38  --------------------------------------------------------  IN:    IV PiggyBack: 450 mL    Oral Fluid: 240 mL    sodium chloride 0.45%.: 70 mL  Total IN: 760 mL    OUT:    Chest Tube: 50 mL    Indwelling Catheter - Urethral: 945 mL  Total OUT: 995 mL    Total NET: -235 mL          CHEST TUBE:  No  ANASTASIA DRAIN:  No.  EPICARDIAL WIRES: No.  TIE DOWNS: Yes  GARY: No.    PHYSICAL EXAM:    General: Patient lying comfortably in bed, no acute distress     Neurological: Alert and oriented. No focal neurological deficits     Cardiovascular: S1S2, RRR, no murmurs appreciated on exam     Respiratory: Poor effort, CTA no wheeze/rhonchi    Gastrointestinal: Abdomen soft, non tender, non distended     Extremities: Warm and well perfused. No edema or calf tenderness     Vascular: 1+ Peripheral pulses     Incision Sites: L thoracotomy: CDI, no dehiscence/drainage/infection.      LABS:                        10.4   8.9   )-----------( 229      ( 06 Apr 2018 11:00 )             31.5       COUMADIN:  No    PT/INR - ( 06 Apr 2018 02:40 )   PT: 13.8 sec;   INR: 1.24          PTT - ( 06 Apr 2018 02:40 )  PTT:30.7 sec    04-06    131<L>  |  94<L>  |  14  ----------------------------<  163<H>  3.1<L>   |  22  |  0.76    Ca    8.4      06 Apr 2018 11:00  Phos  3.0     04-06  Mg     2.1     04-06    TPro  7.6  /  Alb  4.5  /  TBili  0.2  /  DBili  x   /  AST  35  /  ALT  13  /  AlkPhos  54  04-06          MEDICATIONS  (STANDING):  aspirin enteric coated 81 milliGRAM(s) Oral daily  ceFAZolin   IVPB 2000 milliGRAM(s) IV Intermittent every 8 hours  clopidogrel Tablet 75 milliGRAM(s) Oral daily  dextrose 5%. 1000 milliLiter(s) (50 mL/Hr) IV Continuous <Continuous>  dextrose 50% Injectable 12.5 Gram(s) IV Push once  dextrose 50% Injectable 25 Gram(s) IV Push once  docusate sodium 100 milliGRAM(s) Oral three times a day  heparin  Injectable 5000 Unit(s) SubCutaneous every 8 hours  insulin lispro (HumaLOG) corrective regimen sliding scale   SubCutaneous Before meals and at bedtime  lidocaine   Patch 1 Patch Transdermal daily  metoprolol tartrate 50 milliGRAM(s) Oral every 6 hours  pantoprazole    Tablet 40 milliGRAM(s) Oral before breakfast  senna 2 Tablet(s) Oral at bedtime  sodium chloride 0.45%. 1000 milliLiter(s) (10 mL/Hr) IV Continuous <Continuous>  sodium chloride 0.9% lock flush 3 milliLiter(s) IV Push every 8 hours    MEDICATIONS  (PRN):  acetaminophen   Tablet. 650 milliGRAM(s) Oral every 6 hours PRN Mild Pain (1 - 3)  dextrose Gel 1 Dose(s) Oral once PRN Blood Glucose LESS THAN 70 milliGRAM(s)/deciliter  glucagon  Injectable 1 milliGRAM(s) IntraMuscular once PRN Glucose LESS THAN 70 milligrams/deciliter  oxyCODONE    5 mG/acetaminophen 325 mG 2 Tablet(s) Oral every 6 hours PRN Severe Pain (7 - 10)        RADIOLOGY & ADDITIONAL TESTS:  CXR mild congestion, no obvious ptx/effusion/atelectasis

## 2018-04-06 NOTE — PHYSICAL THERAPY INITIAL EVALUATION ADULT - PERTINENT HX OF CURRENT PROBLEM, REHAB EVAL
Patient is a 68 y/o female strong FHX of CAD w/ PMHx of HTN, Hyperlipidemia, known CAD s/p PCI CHENG mLAD (99%) 7/16/13 at [facility] who presented to cardiologist Dr. Cortes for recurrent exertional chest pain described as a tightness radiating to neck and ear, 8/10 severity associated w/ palpitations and which is relieved with rest and deep breath. Please refer to H&P on [place] for remaining.

## 2018-04-06 NOTE — PHYSICAL THERAPY INITIAL EVALUATION ADULT - PRECAUTIONS/LIMITATIONS, REHAB EVAL
Reviewed/educated patient on (L) thoracotomy precautions. Reviewed/educated patient on cough splinting, patient demonstrated understanding./oxygen therapy device and L/min/cardiac precautions

## 2018-04-06 NOTE — DIETITIAN INITIAL EVALUATION ADULT. - OTHER INFO
66y/o F s/p MIDCAB. Pt seen resting in bed. She reports feeling well. Consuming ~50-75% per recall. Denies N/V/D, current pain, and mechanical issues. PTA pt reports eating well and denies wt changes. Provided Dash/TLC diet education and discussed avoiding concentrated sugar sources. Will follow.

## 2018-04-06 NOTE — PHYSICAL THERAPY INITIAL EVALUATION ADULT - THERAPY FREQUENCY, PT EVAL
3-5x/week/Patient educated on frequency of inpatient therapy at [facility], patient verbalized understanding.

## 2018-04-06 NOTE — DIETITIAN INITIAL EVALUATION ADULT. - PERTINENT MEDS FT
aspirin, heparin, plavix, ancef, D5, D50, dex gel, glucagon, SSI, colace, lopressor, protonix, senna

## 2018-04-06 NOTE — CHART NOTE - NSCHARTNOTEFT_GEN_A_CORE
As per Dr. Murrieta, left pleural chest tube removed at bedside without incident. U stitch tied down in place and occlusive dressing applied. Patient tolerated procedure well. Follow up CXR pending

## 2018-04-07 LAB
ALBUMIN SERPL ELPH-MCNC: 4.3 G/DL — SIGNIFICANT CHANGE UP (ref 3.3–5)
ALP SERPL-CCNC: 54 U/L — SIGNIFICANT CHANGE UP (ref 40–120)
ALT FLD-CCNC: 15 U/L — SIGNIFICANT CHANGE UP (ref 10–45)
ANION GAP SERPL CALC-SCNC: 11 MMOL/L — SIGNIFICANT CHANGE UP (ref 5–17)
ANION GAP SERPL CALC-SCNC: 13 MMOL/L — SIGNIFICANT CHANGE UP (ref 5–17)
AST SERPL-CCNC: 38 U/L — SIGNIFICANT CHANGE UP (ref 10–40)
BILIRUB SERPL-MCNC: 0.6 MG/DL — SIGNIFICANT CHANGE UP (ref 0.2–1.2)
BUN SERPL-MCNC: 21 MG/DL — SIGNIFICANT CHANGE UP (ref 7–23)
BUN SERPL-MCNC: 22 MG/DL — SIGNIFICANT CHANGE UP (ref 7–23)
CALCIUM SERPL-MCNC: 9.6 MG/DL — SIGNIFICANT CHANGE UP (ref 8.4–10.5)
CALCIUM SERPL-MCNC: 9.7 MG/DL — SIGNIFICANT CHANGE UP (ref 8.4–10.5)
CHLORIDE SERPL-SCNC: 94 MMOL/L — LOW (ref 96–108)
CHLORIDE SERPL-SCNC: 97 MMOL/L — SIGNIFICANT CHANGE UP (ref 96–108)
CO2 SERPL-SCNC: 25 MMOL/L — SIGNIFICANT CHANGE UP (ref 22–31)
CO2 SERPL-SCNC: 26 MMOL/L — SIGNIFICANT CHANGE UP (ref 22–31)
CREAT SERPL-MCNC: 1.12 MG/DL — SIGNIFICANT CHANGE UP (ref 0.5–1.3)
CREAT SERPL-MCNC: 1.2 MG/DL — SIGNIFICANT CHANGE UP (ref 0.5–1.3)
GLUCOSE BLDC GLUCOMTR-MCNC: 105 MG/DL — HIGH (ref 70–99)
GLUCOSE BLDC GLUCOMTR-MCNC: 112 MG/DL — HIGH (ref 70–99)
GLUCOSE BLDC GLUCOMTR-MCNC: 129 MG/DL — HIGH (ref 70–99)
GLUCOSE BLDC GLUCOMTR-MCNC: 146 MG/DL — HIGH (ref 70–99)
GLUCOSE BLDC GLUCOMTR-MCNC: 189 MG/DL — HIGH (ref 70–99)
GLUCOSE SERPL-MCNC: 133 MG/DL — HIGH (ref 70–99)
GLUCOSE SERPL-MCNC: 140 MG/DL — HIGH (ref 70–99)
HCT VFR BLD CALC: 35.1 % — SIGNIFICANT CHANGE UP (ref 34.5–45)
HGB BLD-MCNC: 11.4 G/DL — LOW (ref 11.5–15.5)
MAGNESIUM SERPL-MCNC: 2.4 MG/DL — SIGNIFICANT CHANGE UP (ref 1.6–2.6)
MAGNESIUM SERPL-MCNC: 2.6 MG/DL — SIGNIFICANT CHANGE UP (ref 1.6–2.6)
MCHC RBC-ENTMCNC: 26.3 PG — LOW (ref 27–34)
MCHC RBC-ENTMCNC: 32.5 G/DL — SIGNIFICANT CHANGE UP (ref 32–36)
MCV RBC AUTO: 80.9 FL — SIGNIFICANT CHANGE UP (ref 80–100)
PLATELET # BLD AUTO: 271 K/UL — SIGNIFICANT CHANGE UP (ref 150–400)
POTASSIUM SERPL-MCNC: 4.9 MMOL/L — SIGNIFICANT CHANGE UP (ref 3.5–5.3)
POTASSIUM SERPL-MCNC: 5 MMOL/L — SIGNIFICANT CHANGE UP (ref 3.5–5.3)
POTASSIUM SERPL-SCNC: 4.9 MMOL/L — SIGNIFICANT CHANGE UP (ref 3.5–5.3)
POTASSIUM SERPL-SCNC: 5 MMOL/L — SIGNIFICANT CHANGE UP (ref 3.5–5.3)
PROT SERPL-MCNC: 7.6 G/DL — SIGNIFICANT CHANGE UP (ref 6–8.3)
RBC # BLD: 4.34 M/UL — SIGNIFICANT CHANGE UP (ref 3.8–5.2)
RBC # FLD: 13.9 % — SIGNIFICANT CHANGE UP (ref 10.3–16.9)
SODIUM SERPL-SCNC: 132 MMOL/L — LOW (ref 135–145)
SODIUM SERPL-SCNC: 134 MMOL/L — LOW (ref 135–145)
WBC # BLD: 13.7 K/UL — HIGH (ref 3.8–10.5)
WBC # FLD AUTO: 13.7 K/UL — HIGH (ref 3.8–10.5)

## 2018-04-07 PROCEDURE — 99233 SBSQ HOSP IP/OBS HIGH 50: CPT

## 2018-04-07 PROCEDURE — 71045 X-RAY EXAM CHEST 1 VIEW: CPT | Mod: 26

## 2018-04-07 RX ORDER — ALBUMIN HUMAN 25 %
250 VIAL (ML) INTRAVENOUS ONCE
Qty: 0 | Refills: 0 | Status: COMPLETED | OUTPATIENT
Start: 2018-04-07 | End: 2018-04-07

## 2018-04-07 RX ORDER — POLYETHYLENE GLYCOL 3350 17 G/17G
17 POWDER, FOR SOLUTION ORAL DAILY
Qty: 0 | Refills: 0 | Status: DISCONTINUED | OUTPATIENT
Start: 2018-04-07 | End: 2018-04-12

## 2018-04-07 RX ORDER — METOPROLOL TARTRATE 50 MG
25 TABLET ORAL EVERY 6 HOURS
Qty: 0 | Refills: 0 | Status: DISCONTINUED | OUTPATIENT
Start: 2018-04-07 | End: 2018-04-09

## 2018-04-07 RX ORDER — FENTANYL CITRATE 50 UG/ML
12.5 INJECTION INTRAVENOUS ONCE
Qty: 0 | Refills: 0 | Status: DISCONTINUED | OUTPATIENT
Start: 2018-04-07 | End: 2018-04-07

## 2018-04-07 RX ADMIN — LIDOCAINE 1 PATCH: 4 CREAM TOPICAL at 13:22

## 2018-04-07 RX ADMIN — Medication 81 MILLIGRAM(S): at 13:22

## 2018-04-07 RX ADMIN — OXYCODONE AND ACETAMINOPHEN 2 TABLET(S): 5; 325 TABLET ORAL at 21:49

## 2018-04-07 RX ADMIN — FENTANYL CITRATE 12.5 MICROGRAM(S): 50 INJECTION INTRAVENOUS at 06:32

## 2018-04-07 RX ADMIN — Medication 50 MILLIGRAM(S): at 06:06

## 2018-04-07 RX ADMIN — FENTANYL CITRATE 12.5 MICROGRAM(S): 50 INJECTION INTRAVENOUS at 03:13

## 2018-04-07 RX ADMIN — LIDOCAINE 1 PATCH: 4 CREAM TOPICAL at 21:16

## 2018-04-07 RX ADMIN — POLYETHYLENE GLYCOL 3350 17 GRAM(S): 17 POWDER, FOR SOLUTION ORAL at 13:39

## 2018-04-07 RX ADMIN — OXYCODONE AND ACETAMINOPHEN 2 TABLET(S): 5; 325 TABLET ORAL at 14:32

## 2018-04-07 RX ADMIN — CLOPIDOGREL BISULFATE 75 MILLIGRAM(S): 75 TABLET, FILM COATED ORAL at 13:22

## 2018-04-07 RX ADMIN — FENTANYL CITRATE 12.5 MICROGRAM(S): 50 INJECTION INTRAVENOUS at 04:15

## 2018-04-07 RX ADMIN — Medication 100 MILLIGRAM(S): at 13:39

## 2018-04-07 RX ADMIN — PANTOPRAZOLE SODIUM 40 MILLIGRAM(S): 20 TABLET, DELAYED RELEASE ORAL at 06:07

## 2018-04-07 RX ADMIN — HEPARIN SODIUM 5000 UNIT(S): 5000 INJECTION INTRAVENOUS; SUBCUTANEOUS at 13:39

## 2018-04-07 RX ADMIN — HYDROMORPHONE HYDROCHLORIDE 0.5 MILLIGRAM(S): 2 INJECTION INTRAMUSCULAR; INTRAVENOUS; SUBCUTANEOUS at 06:33

## 2018-04-07 RX ADMIN — LIDOCAINE 1 PATCH: 4 CREAM TOPICAL at 06:33

## 2018-04-07 RX ADMIN — OXYCODONE AND ACETAMINOPHEN 2 TABLET(S): 5; 325 TABLET ORAL at 15:15

## 2018-04-07 RX ADMIN — FENTANYL CITRATE 12.5 MICROGRAM(S): 50 INJECTION INTRAVENOUS at 07:18

## 2018-04-07 RX ADMIN — SODIUM CHLORIDE 3 MILLILITER(S): 9 INJECTION INTRAMUSCULAR; INTRAVENOUS; SUBCUTANEOUS at 21:17

## 2018-04-07 RX ADMIN — SENNA PLUS 2 TABLET(S): 8.6 TABLET ORAL at 21:49

## 2018-04-07 RX ADMIN — HEPARIN SODIUM 5000 UNIT(S): 5000 INJECTION INTRAVENOUS; SUBCUTANEOUS at 06:06

## 2018-04-07 RX ADMIN — Medication 100 MILLIGRAM(S): at 06:08

## 2018-04-07 RX ADMIN — HEPARIN SODIUM 5000 UNIT(S): 5000 INJECTION INTRAVENOUS; SUBCUTANEOUS at 21:49

## 2018-04-07 RX ADMIN — Medication 25 MILLIGRAM(S): at 13:39

## 2018-04-07 RX ADMIN — Medication 25 MILLIGRAM(S): at 18:45

## 2018-04-07 RX ADMIN — Medication 100 MILLIGRAM(S): at 21:49

## 2018-04-07 RX ADMIN — Medication 125 MILLILITER(S): at 07:29

## 2018-04-07 RX ADMIN — SODIUM CHLORIDE 3 MILLILITER(S): 9 INJECTION INTRAMUSCULAR; INTRAVENOUS; SUBCUTANEOUS at 06:08

## 2018-04-07 RX ADMIN — SODIUM CHLORIDE 3 MILLILITER(S): 9 INJECTION INTRAMUSCULAR; INTRAVENOUS; SUBCUTANEOUS at 13:32

## 2018-04-07 NOTE — PROGRESS NOTE ADULT - SUBJECTIVE AND OBJECTIVE BOX
Patient discussed on morning rounds with Dr. Acevedo      [facility]: [facility]     SUBJECTIVE ASSESSMENT:  Patient seen this morning at bedside, not offering any complaints at this time and states her pain is much better compared to yesterday. She denies any chest pain or shortness of breath.       Vital Signs Last 24 Hrs  T(C): 36.9 (07 Apr 2018 10:00), Max: 37.2 (06 Apr 2018 13:34)  T(F): 98.5 (07 Apr 2018 10:00), Max: 98.9 (06 Apr 2018 13:34)  HR: 66 (07 Apr 2018 12:16) (58 - 114)  BP: 151/71 (07 Apr 2018 09:02) (113/64 - 158/62)  BP(mean): 106 (07 Apr 2018 09:02) (82 - 106)  RR: 20 (07 Apr 2018 12:16) (16 - 95)  SpO2: 98% (07 Apr 2018 12:16) (94% - 98%)  I&O's Detail    06 Apr 2018 07:01  -  07 Apr 2018 07:00  --------------------------------------------------------  IN:    IV PiggyBack: 550 mL    Oral Fluid: 480 mL    sodium chloride 0.45%.: 70 mL  Total IN: 1100 mL    OUT:    Chest Tube: 50 mL    Indwelling Catheter - Urethral: 985 mL    Voided: 600 mL  Total OUT: 1635 mL    Total NET: -535 mL      07 Apr 2018 07:01  -  07 Apr 2018 13:01  --------------------------------------------------------  IN:    Oral Fluid: 360 mL  Total IN: 360 mL    OUT:  Total OUT: 0 mL    Total NET: 360 mL      CHEST TUBE:  No  ANASTASIA DRAIN: No  EPICARDIAL WIRES: No  TIE DOWNS: Yes   GARY: No    PHYSICAL EXAM:    General: Patient sitting comfortably in a chair, no acute distress     Neurological: Alert and oriented. No focal neurological deficits     Cardiovascular: S1S2, RRR, no murmurs appreciated on exam     Respiratory: Poor inspiratory effort, decreased breath sounds at bases     Gastrointestinal: Abdomen soft, non tender, non distended     Extremities: Warm and well perfused. No edema or calf tenderness     Vascular: Peripheral pulses 2+ bilaterally     Incision Sites:  left inframammary thoracotomy incision C/D/I, no drainage or surrounding erythema  Chest tube site covered with occlusive dressing.   LABS:                        11.4   13.7  )-----------( 271      ( 07 Apr 2018 05:58 )             35.1       COUMADIN:  No    PT/INR - ( 06 Apr 2018 15:27 )   PT: 16.3 sec;   INR: 1.46          PTT - ( 06 Apr 2018 15:27 )  PTT:27.5 sec    04-07    132<L>  |  94<L>  |  22  ----------------------------<  133<H>  4.9   |  25  |  1.20    Ca    9.7      07 Apr 2018 05:57  Phos  3.0     04-06  Mg     2.4     04-07    TPro  7.6  /  Alb  4.3  /  TBili  0.6  /  DBili  x   /  AST  38  /  ALT  15  /  AlkPhos  54  04-07    MEDICATIONS  (STANDING):  aspirin enteric coated 81 milliGRAM(s) Oral daily  clopidogrel Tablet 75 milliGRAM(s) Oral daily  docusate sodium 100 milliGRAM(s) Oral three times a day  heparin  Injectable 5000 Unit(s) SubCutaneous every 8 hours  insulin lispro (HumaLOG) corrective regimen sliding scale   SubCutaneous Before meals and at bedtime  lidocaine   Patch 1 Patch Transdermal daily  metoprolol tartrate 50 milliGRAM(s) Oral every 6 hours  pantoprazole    Tablet 40 milliGRAM(s) Oral before breakfast  senna 2 Tablet(s) Oral at bedtime  sodium chloride 0.45%. 1000 milliLiter(s) (10 mL/Hr) IV Continuous <Continuous>  sodium chloride 0.9% lock flush 3 milliLiter(s) IV Push every 8 hours    MEDICATIONS  (PRN):  acetaminophen   Tablet. 650 milliGRAM(s) Oral every 6 hours PRN Mild Pain (1 - 3)  oxyCODONE    5 mG/acetaminophen 325 mG 2 Tablet(s) Oral every 6 hours PRN Severe Pain (7 - 10)  oxyCODONE    5 mG/acetaminophen 325 mG 1 Tablet(s) Oral every 6 hours PRN Moderate Pain (4 - 6)    RADIOLOGY & ADDITIONAL TESTS:  4/7/18 CXR: Pending official read, small bilateral atelectasis vs effusions     A/P: 67 year old female, with strong FHx CAD and PMHx HTN, HLD, known CAD s/p PCI CHENG mLAD 7/16/13, possible hx of TIA presented to her cardiologist Dr. Cortes complaining of exertional chest pain after ambulating less than 1/2 block. Patient subsequently underwent abnormal NST and was referred for elective cardiac cath. Cardiac cath revealed LM 70%, mLAD 50%, oLCx 70%, RCA 50% EF 60%. CT Surgery Dr. Murrieta consulted for possible surgical intervention and hybrid procedure. Patient experienced chest pain post cath and was  transferred to CCU for further management. Preoperative workup was completed and patient underwent MIDCAB procedure with Dr. Murrieta on 4/5/18. Procedure uncomplicated and she was transferred to  from CT ICU on POD#1. No acute events overnight.     Neurovascular: No delirium. Pain well controlled with current regimen.  - continue tylenol and percocet PRN     Cardiovascular: Hemodynamically stable. HR controlled.  - CAD s/p MIDCAB, continue asa 81mg, plavix 75mg, metpprolol 50mg q6.   - HTN, BP meds increased yesterday but this AM SBP 100s with bump in Cr, d/terrell amlodipine. Continue to trend BP     Respiratory: 02 Sat = 98% on RA.  -If on oxygen wean to RA from for O2 Sat > 93%.  -Encourage C+DB and Use of IS 10x / hr while awake.  -CXR.    GI: Stable.  -NPO after MN.  -PPX.  -PO Diet.    Renal / :  -Monitor renal function.  -Monitor I/O's.    Endocrine:    -A1c.  -TSH.    Hematologic:  -CBC.  -Coagulation Panel.    ID:  -Tempature.  -CBC.  -Observe for SIRS/Sepsis Syndrome.    Prophylaxis:  -DVT prophylaxis with 5000 SubQ Heparin q8h.  -SCD's    Disposition:  -ICU for frequent monitoring.: Patient discussed on morning rounds with Dr. Acevedo      [facility]: [facility]     SUBJECTIVE ASSESSMENT:  Patient seen this morning at bedside, not offering any complaints at this time and states her pain is much better compared to yesterday. She denies any chest pain or shortness of breath.       Vital Signs Last 24 Hrs  T(C): 36.9 (07 Apr 2018 10:00), Max: 37.2 (06 Apr 2018 13:34)  T(F): 98.5 (07 Apr 2018 10:00), Max: 98.9 (06 Apr 2018 13:34)  HR: 66 (07 Apr 2018 12:16) (58 - 114)  BP: 151/71 (07 Apr 2018 09:02) (113/64 - 158/62)  BP(mean): 106 (07 Apr 2018 09:02) (82 - 106)  RR: 20 (07 Apr 2018 12:16) (16 - 95)  SpO2: 98% (07 Apr 2018 12:16) (94% - 98%)  I&O's Detail    06 Apr 2018 07:01  -  07 Apr 2018 07:00  --------------------------------------------------------  IN:    IV PiggyBack: 550 mL    Oral Fluid: 480 mL    sodium chloride 0.45%.: 70 mL  Total IN: 1100 mL    OUT:    Chest Tube: 50 mL    Indwelling Catheter - Urethral: 985 mL    Voided: 600 mL  Total OUT: 1635 mL    Total NET: -535 mL      07 Apr 2018 07:01  -  07 Apr 2018 13:01  --------------------------------------------------------  IN:    Oral Fluid: 360 mL  Total IN: 360 mL    OUT:  Total OUT: 0 mL    Total NET: 360 mL      CHEST TUBE:  No  ANASTASIA DRAIN: No  EPICARDIAL WIRES: No  TIE DOWNS: Yes   GARY: No    PHYSICAL EXAM:    General: Patient sitting comfortably in a chair, no acute distress     Neurological: Alert and oriented. No focal neurological deficits     Cardiovascular: S1S2, RRR, no murmurs appreciated on exam     Respiratory: Poor inspiratory effort, decreased breath sounds at bases     Gastrointestinal: Abdomen soft, non tender, non distended     Extremities: Warm and well perfused. No edema or calf tenderness     Vascular: Peripheral pulses 2+ bilaterally     Incision Sites:  left inframammary thoracotomy incision C/D/I, no drainage or surrounding erythema  Chest tube site covered with occlusive dressing.   LABS:                        11.4   13.7  )-----------( 271      ( 07 Apr 2018 05:58 )             35.1       COUMADIN:  No    PT/INR - ( 06 Apr 2018 15:27 )   PT: 16.3 sec;   INR: 1.46          PTT - ( 06 Apr 2018 15:27 )  PTT:27.5 sec    04-07    132<L>  |  94<L>  |  22  ----------------------------<  133<H>  4.9   |  25  |  1.20    Ca    9.7      07 Apr 2018 05:57  Phos  3.0     04-06  Mg     2.4     04-07    TPro  7.6  /  Alb  4.3  /  TBili  0.6  /  DBili  x   /  AST  38  /  ALT  15  /  AlkPhos  54  04-07    MEDICATIONS  (STANDING):  aspirin enteric coated 81 milliGRAM(s) Oral daily  clopidogrel Tablet 75 milliGRAM(s) Oral daily  docusate sodium 100 milliGRAM(s) Oral three times a day  heparin  Injectable 5000 Unit(s) SubCutaneous every 8 hours  insulin lispro (HumaLOG) corrective regimen sliding scale   SubCutaneous Before meals and at bedtime  lidocaine   Patch 1 Patch Transdermal daily  metoprolol tartrate 50 milliGRAM(s) Oral every 6 hours  pantoprazole    Tablet 40 milliGRAM(s) Oral before breakfast  senna 2 Tablet(s) Oral at bedtime  sodium chloride 0.45%. 1000 milliLiter(s) (10 mL/Hr) IV Continuous <Continuous>  sodium chloride 0.9% lock flush 3 milliLiter(s) IV Push every 8 hours    MEDICATIONS  (PRN):  acetaminophen   Tablet. 650 milliGRAM(s) Oral every 6 hours PRN Mild Pain (1 - 3)  oxyCODONE    5 mG/acetaminophen 325 mG 2 Tablet(s) Oral every 6 hours PRN Severe Pain (7 - 10)  oxyCODONE    5 mG/acetaminophen 325 mG 1 Tablet(s) Oral every 6 hours PRN Moderate Pain (4 - 6)    RADIOLOGY & ADDITIONAL TESTS:  4/7/18 CXR: Pending official read, small bilateral atelectasis vs effusions     A/P: 67 year old female, with strong FHx CAD and PMHx HTN, HLD, known CAD s/p PCI CHENG mLAD 7/16/13, possible hx of TIA presented to her cardiologist Dr. Cortes complaining of exertional chest pain after ambulating less than 1/2 block. Patient subsequently underwent abnormal NST and was referred for elective cardiac cath. Cardiac cath revealed LM 70%, mLAD 50%, oLCx 70%, RCA 50% EF 60%. CT Surgery Dr. Murrieta consulted for possible surgical intervention and hybrid procedure. Patient experienced chest pain post cath and was  transferred to CCU for further management. Preoperative workup was completed and patient underwent MIDCAB procedure with Dr. Murrieta on 4/5/18. Procedure uncomplicated and she was transferred to  from CT ICU on POD#1. No acute events overnight.     Neurovascular: No delirium. Pain well controlled with current regimen.  - continue tylenol and percocet PRN     Cardiovascular: Hemodynamically stable. HR controlled.  - CAD s/p MIDCAB, continue asa 81mg, plavix 75mg, metpprolol 50mg q6.   - HTN, BP meds increased yesterday but this AM SBP 100s with bump in Cr, d/terrell amlodipine. Continue to trend BP and Cr     Respiratory: 02 Sat = 95% on HFNC  - Continue to wean O2 to maintain SaO2 > 93%   - Bilateral atelectasis confirmed on bedside US, patient with poor inspiratory effort. Encourage ambulation and use of IS 10x/hr while awake. BIPAP at night for positive pressure. Follow up CXR in AM   - Encourage C+DB and Use of IS 10x / hr while awake.    GI: Stable.  - Continue PO diet   - Continue protonix 40mg for GI ppx   - Continue bowel regimen     Renal / : Cr 1.20   - Bump in BUN/Cr, repeat BMP this afternoon with slight improvement Cr 1.12. D/terrell amlodipine continue to trend. Given 1 albumin for hydration   - Monitor renal function.  - Monitor I/O's.    Endocrine: hgba1c 5.9, TSH 0.895   - continue insulin sliding scale   - monitor fingersticks     Prophylaxis:  - DVT prophylaxis with 5000 SubQ Heparin q8h.  - SCD's    Disposition: Home when medically ready

## 2018-04-07 NOTE — PROGRESS NOTE ADULT - SUBJECTIVE AND OBJECTIVE BOX
CTICU  CRITICAL  CARE  attending     Hand off received 					   Pertinent clinical, laboratory, radiographic, hemodynamic, echocardiographic, respiratory data, microbiologic data and chart were reviewed and analyzed frequently throughout the course of the day and night  Patient seen and examined with CTS/ SH attending at bedside  Pt is a 67y , Female, HEALTH ISSUES - PROBLEM Dx:  Hyperlipidemia, unspecified hyperlipidemia type: Hyperlipidemia, unspecified hyperlipidemia type  Essential hypertension: Essential hypertension  Coronary artery disease involving native coronary artery of native heart with unstable angina pectoris: Coronary artery disease involving native coronary artery of native heart with unstable angina pectoris  Need for prophylactic measure: Need for prophylactic measure  Nutrition, metabolism, and development symptoms: Nutrition, metabolism, and development symptoms  Prediabetes: Prediabetes  Hyperlipidemia: Hyperlipidemia  Hypertension: Hypertension  Angina pectoris: Angina pectoris  Coronary artery disease: Coronary artery disease      , FAMILY HISTORY:  Family history of coronary artery disease (Father, Mother, Sibling, Grandparent)  PAST MEDICAL & SURGICAL HISTORY:  Coronary artery disease  Hyperlipidemia  Hypertension  H/O shoulder surgery  History of percutaneous coronary intervention    Patient is a 67y old  Female who presents with a chief complaint of     14 system review was unremarkable  acute changes include acute respiratory failure  Vital signs, hemodynamic and respiratory parameters were reviewed from the bedside nursing flowsheet.  ICU Vital Signs Last 24 Hrs  T(C): 36.8 (07 Apr 2018 17:14), Max: 36.9 (07 Apr 2018 05:00)  T(F): 98.2 (07 Apr 2018 17:14), Max: 98.5 (07 Apr 2018 10:00)  HR: 83 (07 Apr 2018 16:45) (59 - 114)  BP: 138/55 (07 Apr 2018 13:26) (98/82 - 158/62)  BP(mean): 76 (07 Apr 2018 13:26) (76 - 106)  ABP: --  ABP(mean): --  RR: 20 (07 Apr 2018 16:45) (16 - 95)  SpO2: 100% (07 Apr 2018 16:45) (94% - 100%)    Adult Advanced Hemodynamics Last 24 Hrs  CVP(mm Hg): --  CVP(cm H2O): --  CO: --  CI: --  PA: --  PA(mean): --  PCWP: --  SVR: --  SVRI: --  PVR: --  PVRI: --, ABG - ( 06 Apr 2018 08:52 )  pH: 7.41  /  pCO2: 37    /  pO2: 66    / HCO3: 23    / Base Excess: -1.4  /  SaO2: 92                  Intake and output was reviewed and the fluid balance was calculated  Daily     Daily   I&O's Summary    06 Apr 2018 07:01  -  07 Apr 2018 07:00  --------------------------------------------------------  IN: 1100 mL / OUT: 1635 mL / NET: -535 mL    07 Apr 2018 07:01  -  07 Apr 2018 18:02  --------------------------------------------------------  IN: 540 mL / OUT: 200 mL / NET: 340 mL        All lines and drain sites were assessed  [facility]      POCT Blood Glucose.: 105 mg/dL (07 Apr 2018 16:35)    No acute change in mental status  Auscultation of the chest reveals equal bs  Abdomen is soft  Extremities are warm and well perfused  Wounds appear clean and unremarkable  Antibiotics are periop    labs  CBC Full  -  ( 07 Apr 2018 05:58 )  WBC Count : 13.7 K/uL  Hemoglobin : 11.4 g/dL  Hematocrit : 35.1 %  Platelet Count - Automated : 271 K/uL  Mean Cell Volume : 80.9 fL  Mean Cell Hemoglobin : 26.3 pg  Mean Cell Hemoglobin Concentration : 32.5 g/dL  Auto Neutrophil # : x  Auto Lymphocyte # : x  Auto Monocyte # : x  Auto Eosinophil # : x  Auto Basophil # : x  Auto Neutrophil % : x  Auto Lymphocyte % : x  Auto Monocyte % : x  Auto Eosinophil % : x  Auto Basophil % : x    04-07    134<L>  |  97  |  21  ----------------------------<  140<H>  5.0   |  26  |  1.12    Ca    9.6      07 Apr 2018 12:43  Phos  3.0     04-06  Mg     2.6     04-07    TPro  7.6  /  Alb  4.3  /  TBili  0.6  /  DBili  x   /  AST  38  /  ALT  15  /  AlkPhos  54  04-07    PT/INR - ( 06 Apr 2018 15:27 )   PT: 16.3 sec;   INR: 1.46          PTT - ( 06 Apr 2018 15:27 )  PTT:27.5 sec  The current medications were reviewed   MEDICATIONS  (STANDING):  aspirin enteric coated 81 milliGRAM(s) Oral daily  clopidogrel Tablet 75 milliGRAM(s) Oral daily  dextrose 5%. 1000 milliLiter(s) (50 mL/Hr) IV Continuous <Continuous>  dextrose 50% Injectable 12.5 Gram(s) IV Push once  dextrose 50% Injectable 25 Gram(s) IV Push once  docusate sodium 100 milliGRAM(s) Oral three times a day  heparin  Injectable 5000 Unit(s) SubCutaneous every 8 hours  insulin lispro (HumaLOG) corrective regimen sliding scale   SubCutaneous Before meals and at bedtime  lidocaine   Patch 1 Patch Transdermal daily  metoprolol tartrate 25 milliGRAM(s) Oral every 6 hours  pantoprazole    Tablet 40 milliGRAM(s) Oral before breakfast  polyethylene glycol 3350 17 Gram(s) Oral daily  senna 2 Tablet(s) Oral at bedtime  sodium chloride 0.45%. 1000 milliLiter(s) (10 mL/Hr) IV Continuous <Continuous>  sodium chloride 0.9% lock flush 3 milliLiter(s) IV Push every 8 hours    MEDICATIONS  (PRN):  acetaminophen   Tablet. 650 milliGRAM(s) Oral every 6 hours PRN Mild Pain (1 - 3)  dextrose Gel 1 Dose(s) Oral once PRN Blood Glucose LESS THAN 70 milliGRAM(s)/deciliter  glucagon  Injectable 1 milliGRAM(s) IntraMuscular once PRN Glucose LESS THAN 70 milligrams/deciliter  oxyCODONE    5 mG/acetaminophen 325 mG 1 Tablet(s) Oral every 6 hours PRN Moderate Pain (4 - 6)  oxyCODONE    5 mG/acetaminophen 325 mG 2 Tablet(s) Oral every 6 hours PRN Severe Pain (7 - 10)       PROBLEM LIST/ ASSESSMENT:  HEALTH ISSUES - PROBLEM Dx:  Hyperlipidemia, unspecified hyperlipidemia type: Hyperlipidemia, unspecified hyperlipidemia type  Essential hypertension: Essential hypertension  Coronary artery disease involving native coronary artery of native heart with unstable angina pectoris: Coronary artery disease involving native coronary artery of native heart with unstable angina pectoris  Need for prophylactic measure: Need for prophylactic measure  Nutrition, metabolism, and development symptoms: Nutrition, metabolism, and development symptoms  Prediabetes: Prediabetes  Hyperlipidemia: Hyperlipidemia  Hypertension: Hypertension  Angina pectoris: Angina pectoris  Coronary artery disease: Coronary artery disease      ,   Patient is a 67y old  Female who presents with a chief complaint of    s/p ohs  acute changes include acute respiratory failure    My plan includes :  close hemodynamic, ventilatory and drain monitoring and management per post op routine    Monitor for arrhythmias and monitor parameters for organ perfusion  monitor neurologic status  Head of the bed should remain elevated to 45 deg .   chest PT and IS will be encouraged  monitor adequacy of oxygenation and ventilation and attempt to wean oxygen  Nutritional goals will be met using po eventually , ensure adequate caloric intake and montior the same  Stress ulcer and VTE prophylaxis will be achieved    Glycemic control is satisfactory  Electrolytes have been repleted as necessary and wound care has been carried out. Pain control has been achieved.   agressive physical therapy and early mobility and ambulation goals will be met   The family was updated about the course and plan  CRITICAL CARE TIME SPENT in evaluation and management, reassessments, review and interpretation of labs and x-rays, ventilator and hemodynamic management, formulating a plan and coordinating care: ___90____ MIN.  Time does not include procedural time.  CTICU ATTENDING     					    Lv Frias MD

## 2018-04-08 LAB
ANION GAP SERPL CALC-SCNC: 8 MMOL/L — SIGNIFICANT CHANGE UP (ref 5–17)
APTT BLD: 28.2 SEC — SIGNIFICANT CHANGE UP (ref 27.5–37.4)
BUN SERPL-MCNC: 20 MG/DL — SIGNIFICANT CHANGE UP (ref 7–23)
CALCIUM SERPL-MCNC: 9.4 MG/DL — SIGNIFICANT CHANGE UP (ref 8.4–10.5)
CHLORIDE SERPL-SCNC: 96 MMOL/L — SIGNIFICANT CHANGE UP (ref 96–108)
CO2 SERPL-SCNC: 28 MMOL/L — SIGNIFICANT CHANGE UP (ref 22–31)
CREAT SERPL-MCNC: 0.97 MG/DL — SIGNIFICANT CHANGE UP (ref 0.5–1.3)
GLUCOSE BLDC GLUCOMTR-MCNC: 107 MG/DL — HIGH (ref 70–99)
GLUCOSE BLDC GLUCOMTR-MCNC: 120 MG/DL — HIGH (ref 70–99)
GLUCOSE BLDC GLUCOMTR-MCNC: 134 MG/DL — HIGH (ref 70–99)
GLUCOSE BLDC GLUCOMTR-MCNC: 95 MG/DL — SIGNIFICANT CHANGE UP (ref 70–99)
GLUCOSE SERPL-MCNC: 111 MG/DL — HIGH (ref 70–99)
HCT VFR BLD CALC: 31 % — LOW (ref 34.5–45)
HGB BLD-MCNC: 10 G/DL — LOW (ref 11.5–15.5)
INR BLD: 1.36 — HIGH (ref 0.88–1.16)
MAGNESIUM SERPL-MCNC: 2.5 MG/DL — SIGNIFICANT CHANGE UP (ref 1.6–2.6)
MCHC RBC-ENTMCNC: 26.7 PG — LOW (ref 27–34)
MCHC RBC-ENTMCNC: 32.3 G/DL — SIGNIFICANT CHANGE UP (ref 32–36)
MCV RBC AUTO: 82.7 FL — SIGNIFICANT CHANGE UP (ref 80–100)
PHOSPHATE SERPL-MCNC: 2.4 MG/DL — LOW (ref 2.5–4.5)
PLATELET # BLD AUTO: 225 K/UL — SIGNIFICANT CHANGE UP (ref 150–400)
POTASSIUM SERPL-MCNC: 4.6 MMOL/L — SIGNIFICANT CHANGE UP (ref 3.5–5.3)
POTASSIUM SERPL-SCNC: 4.6 MMOL/L — SIGNIFICANT CHANGE UP (ref 3.5–5.3)
PROTHROM AB SERPL-ACNC: 15.2 SEC — HIGH (ref 9.8–12.7)
RBC # BLD: 3.75 M/UL — LOW (ref 3.8–5.2)
RBC # FLD: 14.3 % — SIGNIFICANT CHANGE UP (ref 10.3–16.9)
SODIUM SERPL-SCNC: 132 MMOL/L — LOW (ref 135–145)
WBC # BLD: 11.2 K/UL — HIGH (ref 3.8–10.5)
WBC # FLD AUTO: 11.2 K/UL — HIGH (ref 3.8–10.5)

## 2018-04-08 PROCEDURE — 71045 X-RAY EXAM CHEST 1 VIEW: CPT | Mod: 26

## 2018-04-08 RX ORDER — FUROSEMIDE 40 MG
20 TABLET ORAL DAILY
Qty: 0 | Refills: 0 | Status: DISCONTINUED | OUTPATIENT
Start: 2018-04-08 | End: 2018-04-12

## 2018-04-08 RX ORDER — POTASSIUM CHLORIDE 20 MEQ
10 PACKET (EA) ORAL DAILY
Qty: 0 | Refills: 0 | Status: DISCONTINUED | OUTPATIENT
Start: 2018-04-09 | End: 2018-04-12

## 2018-04-08 RX ORDER — SODIUM,POTASSIUM PHOSPHATES 278-250MG
1 POWDER IN PACKET (EA) ORAL ONCE
Qty: 0 | Refills: 0 | Status: COMPLETED | OUTPATIENT
Start: 2018-04-08 | End: 2018-04-08

## 2018-04-08 RX ADMIN — SENNA PLUS 2 TABLET(S): 8.6 TABLET ORAL at 21:23

## 2018-04-08 RX ADMIN — Medication 25 MILLIGRAM(S): at 11:04

## 2018-04-08 RX ADMIN — Medication 25 MILLIGRAM(S): at 05:40

## 2018-04-08 RX ADMIN — OXYCODONE AND ACETAMINOPHEN 1 TABLET(S): 5; 325 TABLET ORAL at 15:38

## 2018-04-08 RX ADMIN — HEPARIN SODIUM 5000 UNIT(S): 5000 INJECTION INTRAVENOUS; SUBCUTANEOUS at 05:40

## 2018-04-08 RX ADMIN — Medication 20 MILLIGRAM(S): at 13:03

## 2018-04-08 RX ADMIN — Medication 25 MILLIGRAM(S): at 00:00

## 2018-04-08 RX ADMIN — Medication 650 MILLIGRAM(S): at 10:21

## 2018-04-08 RX ADMIN — HEPARIN SODIUM 5000 UNIT(S): 5000 INJECTION INTRAVENOUS; SUBCUTANEOUS at 13:03

## 2018-04-08 RX ADMIN — CLOPIDOGREL BISULFATE 75 MILLIGRAM(S): 75 TABLET, FILM COATED ORAL at 11:04

## 2018-04-08 RX ADMIN — OXYCODONE AND ACETAMINOPHEN 1 TABLET(S): 5; 325 TABLET ORAL at 22:15

## 2018-04-08 RX ADMIN — Medication 25 MILLIGRAM(S): at 17:58

## 2018-04-08 RX ADMIN — Medication 650 MILLIGRAM(S): at 09:03

## 2018-04-08 RX ADMIN — HEPARIN SODIUM 5000 UNIT(S): 5000 INJECTION INTRAVENOUS; SUBCUTANEOUS at 21:23

## 2018-04-08 RX ADMIN — LIDOCAINE 1 PATCH: 4 CREAM TOPICAL at 11:04

## 2018-04-08 RX ADMIN — POLYETHYLENE GLYCOL 3350 17 GRAM(S): 17 POWDER, FOR SOLUTION ORAL at 11:04

## 2018-04-08 RX ADMIN — Medication 100 MILLIGRAM(S): at 05:40

## 2018-04-08 RX ADMIN — Medication 1 TABLET(S): at 11:04

## 2018-04-08 RX ADMIN — OXYCODONE AND ACETAMINOPHEN 1 TABLET(S): 5; 325 TABLET ORAL at 21:24

## 2018-04-08 RX ADMIN — Medication 100 MILLIGRAM(S): at 21:23

## 2018-04-08 RX ADMIN — SODIUM CHLORIDE 3 MILLILITER(S): 9 INJECTION INTRAMUSCULAR; INTRAVENOUS; SUBCUTANEOUS at 13:03

## 2018-04-08 RX ADMIN — LIDOCAINE 1 PATCH: 4 CREAM TOPICAL at 23:15

## 2018-04-08 RX ADMIN — SODIUM CHLORIDE 3 MILLILITER(S): 9 INJECTION INTRAMUSCULAR; INTRAVENOUS; SUBCUTANEOUS at 21:25

## 2018-04-08 RX ADMIN — PANTOPRAZOLE SODIUM 40 MILLIGRAM(S): 20 TABLET, DELAYED RELEASE ORAL at 05:41

## 2018-04-08 RX ADMIN — OXYCODONE AND ACETAMINOPHEN 1 TABLET(S): 5; 325 TABLET ORAL at 17:38

## 2018-04-08 RX ADMIN — Medication 81 MILLIGRAM(S): at 11:04

## 2018-04-08 RX ADMIN — SODIUM CHLORIDE 3 MILLILITER(S): 9 INJECTION INTRAMUSCULAR; INTRAVENOUS; SUBCUTANEOUS at 05:29

## 2018-04-08 NOTE — PROGRESS NOTE ADULT - SUBJECTIVE AND OBJECTIVE BOX
Patient discussed on morning rounds with Dr. Acevedo      Operation / Date: 4/5/18 [facility]     SUBJECTIVE ASSESSMENT:  Patient seen this morning at bedside, not offering any complaints at this time. She denies any chest pain or shortness of breath. Patient agreeable to ambulate at least 5 times today and will use her IS 10x/hr       Vital Signs Last 24 Hrs  T(C): 36.2 (08 Apr 2018 10:00), Max: 36.8 (07 Apr 2018 17:14)  T(F): 97.2 (08 Apr 2018 10:00), Max: 98.2 (07 Apr 2018 17:14)  HR: 78 (08 Apr 2018 09:00) (66 - 83)  BP: 129/46 (08 Apr 2018 09:00) (98/82 - 157/70)  BP(mean): 73 (08 Apr 2018 09:00) (73 - 90)  RR: 18 (08 Apr 2018 09:00) (16 - 20)  SpO2: 97% (08 Apr 2018 09:00) (96% - 100%)  I&O's Detail    07 Apr 2018 07:01  -  08 Apr 2018 07:00  --------------------------------------------------------  IN:    Oral Fluid: 970 mL  Total IN: 970 mL    OUT:    Voided: 1100 mL  Total OUT: 1100 mL    Total NET: -130 mL      CHEST TUBE:  No  ANASTASIA DRAIN: No  EPICARDIAL WIRES: No  TIE DOWNS: Yes   GARY: No    PHYSICAL EXAM:    General: Patient sitting comfortably in a chair, no acute distress     Neurological: Alert and oriented. No focal neurological deficits     Cardiovascular: S1S2, RRR, no murmurs appreciated on exam     Respiratory: Poor inspiratory effort, decreased breath sounds at bases     Gastrointestinal: Abdomen soft, non tender, non distended     Extremities: Warm and well perfused. No edema or calf tenderness     Vascular: Peripheral pulses 2+ bilaterally     Incision Sites:  left inframammary thoracotomy incision C/D/I, no drainage or surrounding erythema  Chest tube site covered with occlusive dressing.     LABS:                        10.0   11.2  )-----------( 225      ( 08 Apr 2018 06:14 )             31.0       COUMADIN:  No    PT/INR - ( 08 Apr 2018 06:14 )   PT: 15.2 sec;   INR: 1.36          PTT - ( 08 Apr 2018 06:14 )  PTT:28.2 sec    04-08    132<L>  |  96  |  20  ----------------------------<  111<H>  4.6   |  28  |  0.97    Ca    9.4      08 Apr 2018 06:14  Phos  2.4     04-08  Mg     2.5     04-08    TPro  7.6  /  Alb  4.3  /  TBili  0.6  /  DBili  x   /  AST  38  /  ALT  15  /  AlkPhos  54  04-07    MEDICATIONS  (STANDING):  aspirin enteric coated 81 milliGRAM(s) Oral daily  clopidogrel Tablet 75 milliGRAM(s) Oral daily  docusate sodium 100 milliGRAM(s) Oral three times a day  furosemide    Tablet 20 milliGRAM(s) Oral daily  heparin  Injectable 5000 Unit(s) SubCutaneous every 8 hours  insulin lispro (HumaLOG) corrective regimen sliding scale   SubCutaneous Before meals and at bedtime  lidocaine   Patch 1 Patch Transdermal daily  metoprolol tartrate 25 milliGRAM(s) Oral every 6 hours  pantoprazole    Tablet 40 milliGRAM(s) Oral before breakfast  polyethylene glycol 3350 17 Gram(s) Oral daily  senna 2 Tablet(s) Oral at bedtime  sodium chloride 0.45%. 1000 milliLiter(s) (10 mL/Hr) IV Continuous <Continuous>  sodium chloride 0.9% lock flush 3 milliLiter(s) IV Push every 8 hours    MEDICATIONS  (PRN):  acetaminophen   Tablet. 650 milliGRAM(s) Oral every 6 hours PRN Mild Pain (1 - 3)  dextrose Gel 1 Dose(s) Oral once PRN Blood Glucose LESS THAN 70 milliGRAM(s)/deciliter  glucagon  Injectable 1 milliGRAM(s) IntraMuscular once PRN Glucose LESS THAN 70 milligrams/deciliter  oxyCODONE    5 mG/acetaminophen 325 mG 1 Tablet(s) Oral every 6 hours PRN Moderate Pain (4 - 6)  oxyCODONE    5 mG/acetaminophen 325 mG 2 Tablet(s) Oral every 6 hours PRN Severe Pain (7 - 10)    RADIOLOGY & ADDITIONAL TESTS:  4/8/17 CXR: Pending official read, bilateral atelectasis stable compared to yesterday     A/P: 67 year old female, with strong FHx CAD and PMHx HTN, HLD, known CAD s/p PCI CHENG mLAD 7/16/13, possible hx of TIA presented to her cardiologist Dr. Cortes complaining of exertional chest pain after ambulating less than 1/2 block. Patient subsequently underwent abnormal NST and was referred for elective cardiac cath. Cardiac cath revealed LM 70%, mLAD 50%, oLCx 70%, RCA 50% EF 60%. CT Surgery Dr. Murrieta consulted for possible surgical intervention and hybrid procedure. Patient experienced chest pain post cath and was  transferred to CCU for further management. Preoperative workup was completed and patient underwent MIDCAB procedure with Dr. Murrieta on 4/5/18. Procedure uncomplicated and she was transferred to  from CT ICU on POD#1. No acute events overnight.     Neurovascular: No delirium. Pain well controlled with current regimen.  - continue tylenol and percocet PRN     Cardiovascular: Hemodynamically stable. HR controlled.  - CAD s/p MIDCAB, continue asa 81mg, plavix 75mg. Decreased metoprolol to 25mg q6 for slight hypotension and bump in Cr yesterday.     Respiratory: 02 Sat = 97% on HFNC  - Continue to wean O2 to maintain SaO2 > 93%   - Bilateral atelectasis confirmed on bedside US, patient with poor inspiratory effort. Encourage ambulation and use of IS 10x/hr while awake. BIPAP at night for positive pressure. Follow up CXR in AM   - Encourage C+DB and Use of IS 10x / hr while awake.    GI: Stable.  - Continue PO diet   - Continue protonix 40mg for GI ppx   - Continue bowel regimen     Renal / : Cr 0.97  - Cr trending down with hydration and decrease in BP medications yesterday. Lasix 20mg today for diuresis, follow up Cr in AM.    - Monitor renal function.  - Monitor I/O's.    Endocrine: hgba1c 5.9, TSH 0.895   - continue insulin sliding scale   - monitor fingersticks     Prophylaxis:  - DVT prophylaxis with 5000 SubQ Heparin q8h.  - SCD's    Disposition: Home when medically ready

## 2018-04-09 LAB
ANION GAP SERPL CALC-SCNC: 12 MMOL/L — SIGNIFICANT CHANGE UP (ref 5–17)
BUN SERPL-MCNC: 17 MG/DL — SIGNIFICANT CHANGE UP (ref 7–23)
CALCIUM SERPL-MCNC: 9.1 MG/DL — SIGNIFICANT CHANGE UP (ref 8.4–10.5)
CHLORIDE SERPL-SCNC: 95 MMOL/L — LOW (ref 96–108)
CO2 SERPL-SCNC: 25 MMOL/L — SIGNIFICANT CHANGE UP (ref 22–31)
CREAT SERPL-MCNC: 0.76 MG/DL — SIGNIFICANT CHANGE UP (ref 0.5–1.3)
GLUCOSE SERPL-MCNC: 115 MG/DL — HIGH (ref 70–99)
HCT VFR BLD CALC: 32.4 % — LOW (ref 34.5–45)
HGB BLD-MCNC: 10.2 G/DL — LOW (ref 11.5–15.5)
MAGNESIUM SERPL-MCNC: 2.2 MG/DL — SIGNIFICANT CHANGE UP (ref 1.6–2.6)
MCHC RBC-ENTMCNC: 26.1 PG — LOW (ref 27–34)
MCHC RBC-ENTMCNC: 31.5 G/DL — LOW (ref 32–36)
MCV RBC AUTO: 82.9 FL — SIGNIFICANT CHANGE UP (ref 80–100)
PLATELET # BLD AUTO: 271 K/UL — SIGNIFICANT CHANGE UP (ref 150–400)
POTASSIUM SERPL-MCNC: 4.2 MMOL/L — SIGNIFICANT CHANGE UP (ref 3.5–5.3)
POTASSIUM SERPL-SCNC: 4.2 MMOL/L — SIGNIFICANT CHANGE UP (ref 3.5–5.3)
RBC # BLD: 3.91 M/UL — SIGNIFICANT CHANGE UP (ref 3.8–5.2)
RBC # FLD: 14.3 % — SIGNIFICANT CHANGE UP (ref 10.3–16.9)
SODIUM SERPL-SCNC: 132 MMOL/L — LOW (ref 135–145)
WBC # BLD: 8 K/UL — SIGNIFICANT CHANGE UP (ref 3.8–10.5)
WBC # FLD AUTO: 8 K/UL — SIGNIFICANT CHANGE UP (ref 3.8–10.5)

## 2018-04-09 PROCEDURE — 71045 X-RAY EXAM CHEST 1 VIEW: CPT | Mod: 26,76,59

## 2018-04-09 PROCEDURE — 71046 X-RAY EXAM CHEST 2 VIEWS: CPT | Mod: 26

## 2018-04-09 RX ORDER — METOPROLOL TARTRATE 50 MG
37.5 TABLET ORAL EVERY 6 HOURS
Qty: 0 | Refills: 0 | Status: DISCONTINUED | OUTPATIENT
Start: 2018-04-09 | End: 2018-04-12

## 2018-04-09 RX ADMIN — Medication 37.5 MILLIGRAM(S): at 17:33

## 2018-04-09 RX ADMIN — Medication 25 MILLIGRAM(S): at 07:12

## 2018-04-09 RX ADMIN — POLYETHYLENE GLYCOL 3350 17 GRAM(S): 17 POWDER, FOR SOLUTION ORAL at 12:32

## 2018-04-09 RX ADMIN — OXYCODONE AND ACETAMINOPHEN 1 TABLET(S): 5; 325 TABLET ORAL at 15:27

## 2018-04-09 RX ADMIN — Medication 37.5 MILLIGRAM(S): at 12:32

## 2018-04-09 RX ADMIN — Medication 100 MILLIGRAM(S): at 07:14

## 2018-04-09 RX ADMIN — Medication 100 MILLIGRAM(S): at 12:33

## 2018-04-09 RX ADMIN — OXYCODONE AND ACETAMINOPHEN 1 TABLET(S): 5; 325 TABLET ORAL at 05:50

## 2018-04-09 RX ADMIN — SODIUM CHLORIDE 3 MILLILITER(S): 9 INJECTION INTRAMUSCULAR; INTRAVENOUS; SUBCUTANEOUS at 07:02

## 2018-04-09 RX ADMIN — PANTOPRAZOLE SODIUM 40 MILLIGRAM(S): 20 TABLET, DELAYED RELEASE ORAL at 07:13

## 2018-04-09 RX ADMIN — LIDOCAINE 1 PATCH: 4 CREAM TOPICAL at 12:32

## 2018-04-09 RX ADMIN — Medication 10 MILLIEQUIVALENT(S): at 12:32

## 2018-04-09 RX ADMIN — SODIUM CHLORIDE 3 MILLILITER(S): 9 INJECTION INTRAMUSCULAR; INTRAVENOUS; SUBCUTANEOUS at 21:55

## 2018-04-09 RX ADMIN — SODIUM CHLORIDE 3 MILLILITER(S): 9 INJECTION INTRAMUSCULAR; INTRAVENOUS; SUBCUTANEOUS at 12:32

## 2018-04-09 RX ADMIN — Medication 81 MILLIGRAM(S): at 09:02

## 2018-04-09 RX ADMIN — Medication 25 MILLIGRAM(S): at 00:00

## 2018-04-09 RX ADMIN — HEPARIN SODIUM 5000 UNIT(S): 5000 INJECTION INTRAVENOUS; SUBCUTANEOUS at 12:33

## 2018-04-09 RX ADMIN — HEPARIN SODIUM 5000 UNIT(S): 5000 INJECTION INTRAVENOUS; SUBCUTANEOUS at 21:57

## 2018-04-09 RX ADMIN — OXYCODONE AND ACETAMINOPHEN 1 TABLET(S): 5; 325 TABLET ORAL at 04:50

## 2018-04-09 RX ADMIN — Medication 20 MILLIGRAM(S): at 07:13

## 2018-04-09 RX ADMIN — OXYCODONE AND ACETAMINOPHEN 1 TABLET(S): 5; 325 TABLET ORAL at 13:56

## 2018-04-09 RX ADMIN — CLOPIDOGREL BISULFATE 75 MILLIGRAM(S): 75 TABLET, FILM COATED ORAL at 09:02

## 2018-04-09 RX ADMIN — HEPARIN SODIUM 5000 UNIT(S): 5000 INJECTION INTRAVENOUS; SUBCUTANEOUS at 07:13

## 2018-04-09 RX ADMIN — Medication 37.5 MILLIGRAM(S): at 23:27

## 2018-04-09 NOTE — PROGRESS NOTE ADULT - ASSESSMENT
A/P: 67 year old female, with strong FHx CAD and PMHx HTN, HLD, known CAD s/p PCI CHENG mLAD 7/16/13, possible hx of TIA presented to her cardiologist Dr. Cortes complaining of exertional chest pain after ambulating less than 1/2 block. Patient subsequently underwent abnormal NST and was referred for elective cardiac cath. Cardiac cath revealed LM 70%, mLAD 50%, oLCx 70%, RCA 50% EF 60%. CT Surgery Dr. Murrieta consulted for possible surgical intervention and hybrid procedure. Patient experienced chest pain post cath and was  transferred to CCU for further management. Preoperative workup was completed and patient underwent MIDCAB procedure with Dr. Murrieta on 4/5/18. Procedure uncomplicated and she was transferred to  from CT ICU on POD#1. No acute events overnight.     Neurovascular: No delirium. Pain well controlled with current regimen.  - continue tylenol and percocet PRN     Cardiovascular: Hemodynamically stable. HR controlled.  - CAD s/p MIDCAB, continue asa 81mg, plavix 75mg. C/w metoprolol to 25mg q6 for slight hypotension and bump in Cr yesterday.     Respiratory: 02 Sat = 97% on 6LNC  - Continue to wean O2 to maintain SaO2 > 93%   - Bilateral atelectasis confirmed on bedside US, patient with poor inspiratory effort. Encourage ambulation and use of IS 10x/hr while awake. BIPAP at night for positive pressure. Follow up CXR in AM   - Encourage C+DB and Use of IS 10x / hr while awake.  -attempted L pigtail drainage of pelural effusion but unsuccesful and on re-eval the effusion was small and drainage was deferred.  Repeat CXR ruled out PTX    GI: Stable.  - Continue PO diet   - Continue protonix 40mg for GI ppx   - Continue bowel regimen     Renal / : Cr 0.76  - Cr trending down   - Monitor renal function.  - Monitor I/O's.    Endocrine: hgba1c 5.9, TSH 0.895   - continue insulin sliding scale   - monitor fingersticks     Prophylaxis:  - DVT prophylaxis with 5000 SubQ Heparin q8h.  - SCD's    Disposition: Home when medically ready A/P: 67 year old female, with strong FHx CAD and PMHx HTN, HLD, known CAD s/p PCI CHENG mLAD 7/16/13, possible hx of TIA presented to her cardiologist Dr. Cortes complaining of exertional chest pain after ambulating less than 1/2 block. Patient subsequently underwent abnormal NST and was referred for elective cardiac cath. Cardiac cath revealed LM 70%, mLAD 50%, oLCx 70%, RCA 50% EF 60%. CT Surgery Dr. Murrieta consulted for possible surgical intervention and hybrid procedure. Patient experienced chest pain post cath and was  transferred to CCU for further management. Preoperative workup was completed and patient underwent MIDCAB procedure with Dr. Murrieta on 4/5/18. Procedure uncomplicated and she was transferred to  from CT ICU on POD#1. No acute events overnight.     Neurovascular: No delirium. Pain well controlled with current regimen.  - continue tylenol and percocet PRN     Cardiovascular: Hemodynamically stable. HR controlled.  - CAD s/p MIDCAB, continue asa 81mg, plavix 75mg.    - increased metoprolol to 37.5mg q6    Respiratory: 02 Sat = 97% on 6LNC  - Continue to wean O2 to maintain SaO2 > 93%   - Bilateral atelectasis confirmed on bedside US, patient with poor inspiratory effort. Encourage ambulation and use of IS 10x/hr while awake. BIPAP at night for positive pressure. Follow up CXR in AM   - Encourage C+DB and Use of IS 10x / hr while awake.  -attempted L pigtail drainage of pelural effusion but unsuccesful and on re-eval the effusion was small and drainage was deferred.  Repeat CXR ruled out PTX    GI: Stable.  - Continue PO diet   - Continue protonix 40mg for GI ppx   - Continue bowel regimen     Renal / : Cr 0.76   - c/w lasix 20 daily  - Cr trending down   - Monitor renal function.  - Monitor I/O's.    Endocrine: hgba1c 5.9, TSH 0.895   - continue insulin sliding scale   - monitor fingersticks     Prophylaxis:  - DVT prophylaxis with 5000 SubQ Heparin q8h.  - SCD's    Disposition: Home when medically ready

## 2018-04-09 NOTE — PROGRESS NOTE ADULT - SUBJECTIVE AND OBJECTIVE BOX
Patient discussed on morning rounds with Dr. Murrieta     Operation / Date: 4/5/18 MIDCAB    SUBJECTIVE ASSESSMENT:    Pt reports breathing comfortable on nasal canula.  She reports pain with deep breathing but feels pain medications have been helping.  She ambulated in hallway on O2 without difficulty.    Vital Signs Last 24 Hrs  T(C): 36.6 (09 Apr 2018 16:26), Max: 36.8 (09 Apr 2018 05:00)  T(F): 97.9 (09 Apr 2018 16:26), Max: 98.2 (09 Apr 2018 05:00)  HR: 84 (09 Apr 2018 13:10) (76 - 86)  BP: 160/62 (09 Apr 2018 13:10) (115/66 - 160/62)  BP(mean): 79 (09 Apr 2018 13:10) (72 - 101)  RR: 16 (09 Apr 2018 13:10) (16 - 23)  SpO2: 91% (09 Apr 2018 13:10) (91% - 100%)  I&O's Detail    08 Apr 2018 07:01  -  09 Apr 2018 07:00  --------------------------------------------------------  IN:    Oral Fluid: 350 mL  Total IN: 350 mL    OUT:    Voided: 1200 mL  Total OUT: 1200 mL    Total NET: -850 mL      09 Apr 2018 07:01  -  09 Apr 2018 17:45  --------------------------------------------------------  IN:  Total IN: 0 mL    OUT:    Voided: 400 mL  Total OUT: 400 mL    Total NET: -400 mL          CHEST TUBE:  No.   ANASTASIA DRAIN:  No.  EPICARDIAL WIRES: No.  TIE DOWNS: Yes.  GARY: No.    PHYSICAL EXAM:    General: NAD    Neurological: A&Ox3    Cardiovascular: S1S2 RRR    Respiratory: decreased at b/l bases    Gastrointestinal: soft NT/ND +BS    Extremities: warm, no edema    Vascular: warm and well perfused bilaterally    Incision Sites: L anterior thoracotomy C/d/I    LABS:                        10.2   8.0   )-----------( 271      ( 09 Apr 2018 06:54 )             32.4       COUMADIN:  Yes/No. REASON: .    PT/INR - ( 08 Apr 2018 06:14 )   PT: 15.2 sec;   INR: 1.36          PTT - ( 08 Apr 2018 06:14 )  PTT:28.2 sec    04-09    132<L>  |  95<L>  |  17  ----------------------------<  115<H>  4.2   |  25  |  0.76    Ca    9.1      09 Apr 2018 06:54  Phos  2.4     04-08  Mg     2.2     04-09            MEDICATIONS  (STANDING):  aspirin enteric coated 81 milliGRAM(s) Oral daily  clopidogrel Tablet 75 milliGRAM(s) Oral daily  docusate sodium 100 milliGRAM(s) Oral three times a day  furosemide    Tablet 20 milliGRAM(s) Oral daily  heparin  Injectable 5000 Unit(s) SubCutaneous every 8 hours  lidocaine   Patch 1 Patch Transdermal daily  metoprolol tartrate 37.5 milliGRAM(s) Oral every 6 hours  pantoprazole    Tablet 40 milliGRAM(s) Oral before breakfast  polyethylene glycol 3350 17 Gram(s) Oral daily  potassium chloride    Tablet ER 10 milliEquivalent(s) Oral daily  senna 2 Tablet(s) Oral at bedtime  sodium chloride 0.45%. 1000 milliLiter(s) (10 mL/Hr) IV Continuous <Continuous>  sodium chloride 0.9% lock flush 3 milliLiter(s) IV Push every 8 hours    MEDICATIONS  (PRN):  acetaminophen   Tablet. 650 milliGRAM(s) Oral every 6 hours PRN Mild Pain (1 - 3)  oxyCODONE    5 mG/acetaminophen 325 mG 1 Tablet(s) Oral every 6 hours PRN Moderate Pain (4 - 6)  oxyCODONE    5 mG/acetaminophen 325 mG 2 Tablet(s) Oral every 6 hours PRN Severe Pain (7 - 10)        RADIOLOGY & ADDITIONAL TESTS:    CXR: b/l opacities L>R, no signif effusion on CXR

## 2018-04-10 LAB
ANION GAP SERPL CALC-SCNC: 14 MMOL/L — SIGNIFICANT CHANGE UP (ref 5–17)
BUN SERPL-MCNC: 14 MG/DL — SIGNIFICANT CHANGE UP (ref 7–23)
CALCIUM SERPL-MCNC: 9.7 MG/DL — SIGNIFICANT CHANGE UP (ref 8.4–10.5)
CHLORIDE SERPL-SCNC: 96 MMOL/L — SIGNIFICANT CHANGE UP (ref 96–108)
CO2 SERPL-SCNC: 25 MMOL/L — SIGNIFICANT CHANGE UP (ref 22–31)
CREAT SERPL-MCNC: 0.73 MG/DL — SIGNIFICANT CHANGE UP (ref 0.5–1.3)
GLUCOSE SERPL-MCNC: 115 MG/DL — HIGH (ref 70–99)
HCT VFR BLD CALC: 34.7 % — SIGNIFICANT CHANGE UP (ref 34.5–45)
HGB BLD-MCNC: 10.8 G/DL — LOW (ref 11.5–15.5)
MAGNESIUM SERPL-MCNC: 2.2 MG/DL — SIGNIFICANT CHANGE UP (ref 1.6–2.6)
MCHC RBC-ENTMCNC: 25.5 PG — LOW (ref 27–34)
MCHC RBC-ENTMCNC: 31.1 G/DL — LOW (ref 32–36)
MCV RBC AUTO: 81.8 FL — SIGNIFICANT CHANGE UP (ref 80–100)
PLATELET # BLD AUTO: 350 K/UL — SIGNIFICANT CHANGE UP (ref 150–400)
POTASSIUM SERPL-MCNC: 4.5 MMOL/L — SIGNIFICANT CHANGE UP (ref 3.5–5.3)
POTASSIUM SERPL-SCNC: 4.5 MMOL/L — SIGNIFICANT CHANGE UP (ref 3.5–5.3)
RBC # BLD: 4.24 M/UL — SIGNIFICANT CHANGE UP (ref 3.8–5.2)
RBC # FLD: 14.2 % — SIGNIFICANT CHANGE UP (ref 10.3–16.9)
SODIUM SERPL-SCNC: 135 MMOL/L — SIGNIFICANT CHANGE UP (ref 135–145)
WBC # BLD: 7.2 K/UL — SIGNIFICANT CHANGE UP (ref 3.8–10.5)
WBC # FLD AUTO: 7.2 K/UL — SIGNIFICANT CHANGE UP (ref 3.8–10.5)

## 2018-04-10 RX ORDER — TRAMADOL HYDROCHLORIDE 50 MG/1
25 TABLET ORAL EVERY 6 HOURS
Qty: 0 | Refills: 0 | Status: DISCONTINUED | OUTPATIENT
Start: 2018-04-10 | End: 2018-04-12

## 2018-04-10 RX ORDER — IPRATROPIUM BROMIDE 0.2 MG/ML
500 SOLUTION, NON-ORAL INHALATION EVERY 6 HOURS
Qty: 0 | Refills: 0 | Status: DISCONTINUED | OUTPATIENT
Start: 2018-04-10 | End: 2018-04-12

## 2018-04-10 RX ADMIN — LIDOCAINE 1 PATCH: 4 CREAM TOPICAL at 00:27

## 2018-04-10 RX ADMIN — Medication 81 MILLIGRAM(S): at 12:02

## 2018-04-10 RX ADMIN — Medication 100 MILLIGRAM(S): at 06:05

## 2018-04-10 RX ADMIN — POLYETHYLENE GLYCOL 3350 17 GRAM(S): 17 POWDER, FOR SOLUTION ORAL at 14:08

## 2018-04-10 RX ADMIN — PANTOPRAZOLE SODIUM 40 MILLIGRAM(S): 20 TABLET, DELAYED RELEASE ORAL at 06:04

## 2018-04-10 RX ADMIN — Medication 650 MILLIGRAM(S): at 17:46

## 2018-04-10 RX ADMIN — HEPARIN SODIUM 5000 UNIT(S): 5000 INJECTION INTRAVENOUS; SUBCUTANEOUS at 21:49

## 2018-04-10 RX ADMIN — CLOPIDOGREL BISULFATE 75 MILLIGRAM(S): 75 TABLET, FILM COATED ORAL at 12:02

## 2018-04-10 RX ADMIN — SENNA PLUS 2 TABLET(S): 8.6 TABLET ORAL at 21:49

## 2018-04-10 RX ADMIN — Medication 100 MILLIGRAM(S): at 21:49

## 2018-04-10 RX ADMIN — SODIUM CHLORIDE 3 MILLILITER(S): 9 INJECTION INTRAMUSCULAR; INTRAVENOUS; SUBCUTANEOUS at 06:10

## 2018-04-10 RX ADMIN — LIDOCAINE 1 PATCH: 4 CREAM TOPICAL at 17:00

## 2018-04-10 RX ADMIN — TRAMADOL HYDROCHLORIDE 25 MILLIGRAM(S): 50 TABLET ORAL at 20:44

## 2018-04-10 RX ADMIN — SODIUM CHLORIDE 3 MILLILITER(S): 9 INJECTION INTRAMUSCULAR; INTRAVENOUS; SUBCUTANEOUS at 13:44

## 2018-04-10 RX ADMIN — SODIUM CHLORIDE 3 MILLILITER(S): 9 INJECTION INTRAMUSCULAR; INTRAVENOUS; SUBCUTANEOUS at 21:43

## 2018-04-10 RX ADMIN — Medication 37.5 MILLIGRAM(S): at 17:47

## 2018-04-10 RX ADMIN — OXYCODONE AND ACETAMINOPHEN 1 TABLET(S): 5; 325 TABLET ORAL at 09:55

## 2018-04-10 RX ADMIN — HEPARIN SODIUM 5000 UNIT(S): 5000 INJECTION INTRAVENOUS; SUBCUTANEOUS at 06:04

## 2018-04-10 RX ADMIN — Medication 100 MILLIGRAM(S): at 14:08

## 2018-04-10 RX ADMIN — Medication 650 MILLIGRAM(S): at 17:47

## 2018-04-10 RX ADMIN — TRAMADOL HYDROCHLORIDE 25 MILLIGRAM(S): 50 TABLET ORAL at 14:43

## 2018-04-10 RX ADMIN — Medication 500 MICROGRAM(S): at 17:47

## 2018-04-10 RX ADMIN — HEPARIN SODIUM 5000 UNIT(S): 5000 INJECTION INTRAVENOUS; SUBCUTANEOUS at 14:08

## 2018-04-10 RX ADMIN — Medication 20 MILLIGRAM(S): at 06:03

## 2018-04-10 RX ADMIN — Medication 500 MICROGRAM(S): at 12:08

## 2018-04-10 RX ADMIN — Medication 37.5 MILLIGRAM(S): at 06:04

## 2018-04-10 RX ADMIN — TRAMADOL HYDROCHLORIDE 25 MILLIGRAM(S): 50 TABLET ORAL at 14:08

## 2018-04-10 RX ADMIN — OXYCODONE AND ACETAMINOPHEN 1 TABLET(S): 5; 325 TABLET ORAL at 11:20

## 2018-04-10 RX ADMIN — Medication 37.5 MILLIGRAM(S): at 12:02

## 2018-04-10 RX ADMIN — Medication 40 MILLIGRAM(S): at 17:47

## 2018-04-10 NOTE — PROGRESS NOTE ADULT - SUBJECTIVE AND OBJECTIVE BOX
Patient discussed on morning rounds with Dr. Murrieta     Operation / Date: MIDCAB 4/5 EF normal     SUBJECTIVE ASSESSMENT:  67y Female seen at bedside. Patient complaining of left sided back/chest pain at site of thoracentesis attempt but is improving since yesterday. Patient wore her BIPAP last night. OOB to chair this AM, weaning O2        Vital Signs Last 24 Hrs  T(C): 35.9 (10 Apr 2018 10:30), Max: 36.6 (09 Apr 2018 16:26)  T(F): 96.7 (10 Apr 2018 10:30), Max: 97.9 (09 Apr 2018 16:26)  HR: 68 (10 Apr 2018 11:51) (68 - 84)  BP: 150/64 (10 Apr 2018 11:51) (114/57 - 161/66)  BP(mean): 95 (10 Apr 2018 11:51) (79 - 102)  RR: 16 (10 Apr 2018 11:51) (16 - 28)  SpO2: 100% (10 Apr 2018 11:51) (91% - 100%)  I&O's Detail    09 Apr 2018 07:01  -  10 Apr 2018 07:00  --------------------------------------------------------  IN:  Total IN: 0 mL    OUT:    Voided: 1250 mL  Total OUT: 1250 mL    Total NET: -1250 mL      10 Apr 2018 07:01  -  10 Apr 2018 12:05  --------------------------------------------------------  IN:    Oral Fluid: 200 mL  Total IN: 200 mL    OUT:    Voided: 700 mL  Total OUT: 700 mL    Total NET: -500 mL          CHEST TUBE:  No  ANASTASIA DRAIN:  No  EPICARDIAL WIRES: No  TIE DOWNS: Yes  GARY: No    PHYSICAL EXAM:    General: NAD, stable     Neurological: A&O x3, no focal deficits     Cardiovascular: RRR, normal S1, S2     Respiratory: decreased BS at bases b/l    Gastrointestinal: + BS, soft, nontender     Extremities: no edema     Vascular: + pulses     Incision Sites: Left thoracotomy site, C/D/I     LABS:                        10.8   7.2   )-----------( 350      ( 10 Apr 2018 06:14 )             34.7       COUMADIN:  No        04-10    135  |  96  |  14  ----------------------------<  115<H>  4.5   |  25  |  0.73    Ca    9.7      10 Apr 2018 06:13  Mg     2.2     04-10            MEDICATIONS  (STANDING):  aspirin enteric coated 81 milliGRAM(s) Oral daily  clopidogrel Tablet 75 milliGRAM(s) Oral daily  docusate sodium 100 milliGRAM(s) Oral three times a day  furosemide    Tablet 20 milliGRAM(s) Oral daily  heparin  Injectable 5000 Unit(s) SubCutaneous every 8 hours  ipratropium    for Nebulization 500 MICROGram(s) Nebulizer every 6 hours  lidocaine   Patch 1 Patch Transdermal daily  metoprolol tartrate 37.5 milliGRAM(s) Oral every 6 hours  pantoprazole    Tablet 40 milliGRAM(s) Oral before breakfast  polyethylene glycol 3350 17 Gram(s) Oral daily  potassium chloride    Tablet ER 10 milliEquivalent(s) Oral daily  predniSONE   Tablet 40 milliGRAM(s) Oral daily  senna 2 Tablet(s) Oral at bedtime  sodium chloride 0.45%. 1000 milliLiter(s) (10 mL/Hr) IV Continuous <Continuous>  sodium chloride 0.9% lock flush 3 milliLiter(s) IV Push every 8 hours    MEDICATIONS  (PRN):  acetaminophen   Tablet. 650 milliGRAM(s) Oral every 6 hours PRN Mild Pain (1 - 3)  traMADol 25 milliGRAM(s) Oral every 6 hours PRN Severe Pain (7 - 10)        A/P: 67 year old female, with strong FHx CAD and PMHx HTN, HLD, known CAD s/p PCI CHENG mLAD 7/16/13, possible hx of TIA presented to her cardiologist Dr. Cortes complaining of exertional chest pain after ambulating less than 1/2 block. Patient subsequently underwent abnormal NST and was referred for elective cardiac cath. Cardiac cath revealed LM 70%, mLAD 50%, oLCx 70%, RCA 50% EF 60%. CT Surgery Dr. Murrieta consulted for possible surgical intervention and hybrid procedure. Patient experienced chest pain post cath and was  transferred to CCU for further management. Preoperative workup was completed and patient underwent MIDCAB procedure with Dr. Murrieta on 4/5/18. Procedure uncomplicated and she was transferred to  from CT ICU on POD#1. No acute events overnight.     Neurovascular: No delirium. Pain well controlled with current regimen.  - continue tylenol and percocet PRN     Cardiovascular: Hemodynamically stable. HR controlled.  - CAD s/p MIDCAB, continue asa 81mg, plavix 75mg.    - increased metoprolol to 37.5mg q6    Respiratory: 02 Sat = 97% on 6LNC  - Continue to wean O2 to maintain SaO2 > 93%, required HFNC  - Bilateral atelectasis confirmed on bedside US, patient with poor inspiratory effort. Encourage ambulation and use of IS 10x/hr while awake. BIPAP at night for positive pressure. Follow up CXR in AM   - Encourage C+DB and Use of IS 10x / hr while awake.  -attempted L pigtail drainage of pelural effusion but unsuccesful and on re-eval the effusion was small and drainage was deferred.  Repeat CXR ruled out PTX    GI: Stable.  - Continue PO diet   - Continue protonix 40mg for GI ppx   - Continue bowel regimen     Renal / : Cr 0.76   - c/w lasix 20 daily  - Cr trending down   - Monitor renal function.  - Monitor I/O's.    Endocrine: hgba1c 5.9, TSH 0.895   - continue insulin sliding scale   - monitor fingersticks     Prophylaxis:  - DVT prophylaxis with 5000 SubQ Heparin q8h.  - SCD's Patient discussed on morning rounds with Dr. Murrieta     Operation / Date: MIDCAB 4/5 EF normal     SUBJECTIVE ASSESSMENT:  67y Female seen at bedside. Patient complaining of left sided back/chest pain at site of thoracentesis attempt but is improving since yesterday. Patient wore her BIPAP last night. OOB to chair this AM, weaning O2        Vital Signs Last 24 Hrs  T(C): 35.9 (10 Apr 2018 10:30), Max: 36.6 (09 Apr 2018 16:26)  T(F): 96.7 (10 Apr 2018 10:30), Max: 97.9 (09 Apr 2018 16:26)  HR: 68 (10 Apr 2018 11:51) (68 - 84)  BP: 150/64 (10 Apr 2018 11:51) (114/57 - 161/66)  BP(mean): 95 (10 Apr 2018 11:51) (79 - 102)  RR: 16 (10 Apr 2018 11:51) (16 - 28)  SpO2: 100% (10 Apr 2018 11:51) (91% - 100%)  I&O's Detail    09 Apr 2018 07:01  -  10 Apr 2018 07:00  --------------------------------------------------------  IN:  Total IN: 0 mL    OUT:    Voided: 1250 mL  Total OUT: 1250 mL    Total NET: -1250 mL      10 Apr 2018 07:01  -  10 Apr 2018 12:05  --------------------------------------------------------  IN:    Oral Fluid: 200 mL  Total IN: 200 mL    OUT:    Voided: 700 mL  Total OUT: 700 mL    Total NET: -500 mL          CHEST TUBE:  No  ANASTASIA DRAIN:  No  EPICARDIAL WIRES: No  TIE DOWNS: Yes  GARY: No    PHYSICAL EXAM:    General: NAD, stable     Neurological: A&O x3, no focal deficits     Cardiovascular: RRR, normal S1, S2     Respiratory: decreased BS at bases b/l    Gastrointestinal: + BS, soft, nontender     Extremities: no edema     Vascular: + pulses     Incision Sites: Left thoracotomy site, C/D/I     LABS:                        10.8   7.2   )-----------( 350      ( 10 Apr 2018 06:14 )             34.7       COUMADIN:  No        04-10    135  |  96  |  14  ----------------------------<  115<H>  4.5   |  25  |  0.73    Ca    9.7      10 Apr 2018 06:13  Mg     2.2     04-10            MEDICATIONS  (STANDING):  aspirin enteric coated 81 milliGRAM(s) Oral daily  clopidogrel Tablet 75 milliGRAM(s) Oral daily  docusate sodium 100 milliGRAM(s) Oral three times a day  furosemide    Tablet 20 milliGRAM(s) Oral daily  heparin  Injectable 5000 Unit(s) SubCutaneous every 8 hours  ipratropium    for Nebulization 500 MICROGram(s) Nebulizer every 6 hours  lidocaine   Patch 1 Patch Transdermal daily  metoprolol tartrate 37.5 milliGRAM(s) Oral every 6 hours  pantoprazole    Tablet 40 milliGRAM(s) Oral before breakfast  polyethylene glycol 3350 17 Gram(s) Oral daily  potassium chloride    Tablet ER 10 milliEquivalent(s) Oral daily  predniSONE   Tablet 40 milliGRAM(s) Oral daily  senna 2 Tablet(s) Oral at bedtime  sodium chloride 0.45%. 1000 milliLiter(s) (10 mL/Hr) IV Continuous <Continuous>  sodium chloride 0.9% lock flush 3 milliLiter(s) IV Push every 8 hours    MEDICATIONS  (PRN):  acetaminophen   Tablet. 650 milliGRAM(s) Oral every 6 hours PRN Mild Pain (1 - 3)  traMADol 25 milliGRAM(s) Oral every 6 hours PRN Severe Pain (7 - 10)        A/P: 67 year old female, with strong FHx CAD and PMHx HTN, HLD, known CAD s/p PCI CHENG mLAD 7/16/13, possible hx of TIA presented to her cardiologist Dr. Cortes complaining of exertional chest pain after ambulating less than 1/2 block. Patient subsequently underwent abnormal NST and was referred for elective cardiac cath. Cardiac cath revealed LM 70%, mLAD 50%, oLCx 70%, RCA 50% EF 60%. CT Surgery Dr. Murrieta consulted for possible surgical intervention and hybrid procedure. Patient experienced chest pain post cath and was  transferred to CCU for further management. Preoperative workup was completed and patient underwent MIDCAB procedure with Dr. Murrieta on 4/5/18. Procedure uncomplicated and she was transferred to  from CT ICU on POD#1. No acute events overnight.     Neurovascular: No delirium. Pain semi controlled   - continue tylenol, changed percocet to tramadol for increased lethargy    Cardiovascular: Hemodynamically stable. HR controlled.  - CAD s/p MIDCAB, continue asa 81mg, plavix 75mg.    - increased metoprolol to 37.5mg q6    Respiratory: 02 Sat = 97% on 6LNC  - Continue to wean O2 to maintain SaO2 > 93%, required HFNC post op, now on 4L NC  - Bilateral atelectasis confirmed on bedside US, patient with poor inspiratory effort. Encourage ambulation and use of IS 10x/hr while awake. BIPAP at night for positive pressure. Follow up CXR in AM   - Encourage C+DB and Use of IS 10x / hr while awake.  -attempted L pigtail drainage of pelural effusion but unsuccesful 4/9. cont to monitor daily     GI: Stable.  - Continue PO diet   - Continue protonix 40mg for GI ppx   - Continue bowel regimen     Renal / : Cr 0.76   - c/w lasix 20 daily  - Cr trending down   - Monitor renal function.  - Monitor I/O's.    Endocrine: hgba1c 5.9, TSH 0.895   - continue insulin sliding scale   - monitor fingersticks     Prophylaxis:  - DVT prophylaxis with 5000 SubQ Heparin q8h.  - SCD's

## 2018-04-11 ENCOUNTER — TRANSCRIPTION ENCOUNTER (OUTPATIENT)
Age: 68
End: 2018-04-11

## 2018-04-11 LAB
ANION GAP SERPL CALC-SCNC: 17 MMOL/L — SIGNIFICANT CHANGE UP (ref 5–17)
BUN SERPL-MCNC: 18 MG/DL — SIGNIFICANT CHANGE UP (ref 7–23)
CALCIUM SERPL-MCNC: 9.8 MG/DL — SIGNIFICANT CHANGE UP (ref 8.4–10.5)
CHLORIDE SERPL-SCNC: 94 MMOL/L — LOW (ref 96–108)
CO2 SERPL-SCNC: 23 MMOL/L — SIGNIFICANT CHANGE UP (ref 22–31)
CREAT SERPL-MCNC: 0.77 MG/DL — SIGNIFICANT CHANGE UP (ref 0.5–1.3)
GLUCOSE SERPL-MCNC: 162 MG/DL — HIGH (ref 70–99)
HCT VFR BLD CALC: 36 % — SIGNIFICANT CHANGE UP (ref 34.5–45)
HGB BLD-MCNC: 11.4 G/DL — LOW (ref 11.5–15.5)
MAGNESIUM SERPL-MCNC: 2.3 MG/DL — SIGNIFICANT CHANGE UP (ref 1.6–2.6)
MCHC RBC-ENTMCNC: 25.9 PG — LOW (ref 27–34)
MCHC RBC-ENTMCNC: 31.7 G/DL — LOW (ref 32–36)
MCV RBC AUTO: 81.6 FL — SIGNIFICANT CHANGE UP (ref 80–100)
PLATELET # BLD AUTO: 420 K/UL — HIGH (ref 150–400)
POTASSIUM SERPL-MCNC: 4.8 MMOL/L — SIGNIFICANT CHANGE UP (ref 3.5–5.3)
POTASSIUM SERPL-SCNC: 4.8 MMOL/L — SIGNIFICANT CHANGE UP (ref 3.5–5.3)
RBC # BLD: 4.41 M/UL — SIGNIFICANT CHANGE UP (ref 3.8–5.2)
RBC # FLD: 14.1 % — SIGNIFICANT CHANGE UP (ref 10.3–16.9)
SODIUM SERPL-SCNC: 134 MMOL/L — LOW (ref 135–145)
WBC # BLD: 6.4 K/UL — SIGNIFICANT CHANGE UP (ref 3.8–10.5)
WBC # FLD AUTO: 6.4 K/UL — SIGNIFICANT CHANGE UP (ref 3.8–10.5)

## 2018-04-11 PROCEDURE — 71045 X-RAY EXAM CHEST 1 VIEW: CPT | Mod: 26

## 2018-04-11 RX ORDER — HYDRALAZINE HCL 50 MG
5 TABLET ORAL ONCE
Qty: 0 | Refills: 0 | Status: COMPLETED | OUTPATIENT
Start: 2018-04-11 | End: 2018-04-11

## 2018-04-11 RX ORDER — HYDRALAZINE HCL 50 MG
10 TABLET ORAL ONCE
Qty: 0 | Refills: 0 | Status: COMPLETED | OUTPATIENT
Start: 2018-04-11 | End: 2018-04-11

## 2018-04-11 RX ORDER — LOSARTAN POTASSIUM 100 MG/1
1 TABLET, FILM COATED ORAL
Qty: 0 | Refills: 0 | COMMUNITY

## 2018-04-11 RX ORDER — CLOPIDOGREL BISULFATE 75 MG/1
1 TABLET, FILM COATED ORAL
Qty: 30 | Refills: 0 | OUTPATIENT
Start: 2018-04-11 | End: 2018-05-10

## 2018-04-11 RX ORDER — METOPROLOL TARTRATE 50 MG
1 TABLET ORAL
Qty: 60 | Refills: 0 | OUTPATIENT
Start: 2018-04-11 | End: 2018-05-10

## 2018-04-11 RX ORDER — TRAMADOL HYDROCHLORIDE 50 MG/1
0.5 TABLET ORAL
Qty: 14 | Refills: 0 | OUTPATIENT
Start: 2018-04-11 | End: 2018-04-17

## 2018-04-11 RX ORDER — AMLODIPINE BESYLATE 2.5 MG/1
1 TABLET ORAL
Qty: 0 | Refills: 0 | COMMUNITY

## 2018-04-11 RX ORDER — POTASSIUM CHLORIDE 20 MEQ
1 PACKET (EA) ORAL
Qty: 7 | Refills: 0 | OUTPATIENT
Start: 2018-04-11 | End: 2018-04-17

## 2018-04-11 RX ORDER — FUROSEMIDE 40 MG
1 TABLET ORAL
Qty: 7 | Refills: 0 | OUTPATIENT
Start: 2018-04-11 | End: 2018-04-17

## 2018-04-11 RX ORDER — AMLODIPINE BESYLATE 2.5 MG/1
5 TABLET ORAL DAILY
Qty: 0 | Refills: 0 | Status: DISCONTINUED | OUTPATIENT
Start: 2018-04-11 | End: 2018-04-12

## 2018-04-11 RX ORDER — METOPROLOL TARTRATE 50 MG
1 TABLET ORAL
Qty: 0 | Refills: 0 | COMMUNITY

## 2018-04-11 RX ORDER — ATORVASTATIN CALCIUM 80 MG/1
1 TABLET, FILM COATED ORAL
Qty: 30 | Refills: 0 | OUTPATIENT
Start: 2018-04-11 | End: 2018-05-10

## 2018-04-11 RX ORDER — ASPIRIN/CALCIUM CARB/MAGNESIUM 324 MG
1 TABLET ORAL
Qty: 30 | Refills: 0 | OUTPATIENT
Start: 2018-04-11 | End: 2018-05-10

## 2018-04-11 RX ORDER — RANOLAZINE 500 MG/1
1 TABLET, FILM COATED, EXTENDED RELEASE ORAL
Qty: 0 | Refills: 0 | COMMUNITY

## 2018-04-11 RX ORDER — LOSARTAN POTASSIUM 100 MG/1
50 TABLET, FILM COATED ORAL DAILY
Qty: 0 | Refills: 0 | Status: DISCONTINUED | OUTPATIENT
Start: 2018-04-11 | End: 2018-04-12

## 2018-04-11 RX ORDER — ISOSORBIDE MONONITRATE 60 MG/1
1 TABLET, EXTENDED RELEASE ORAL
Qty: 0 | Refills: 0 | COMMUNITY

## 2018-04-11 RX ORDER — ACETAMINOPHEN 500 MG
2 TABLET ORAL
Qty: 112 | Refills: 0 | OUTPATIENT
Start: 2018-04-11 | End: 2018-04-24

## 2018-04-11 RX ORDER — AMLODIPINE BESYLATE 2.5 MG/1
1 TABLET ORAL
Qty: 30 | Refills: 0 | OUTPATIENT
Start: 2018-04-11 | End: 2018-05-10

## 2018-04-11 RX ORDER — DOCUSATE SODIUM 100 MG
1 CAPSULE ORAL
Qty: 21 | Refills: 0 | OUTPATIENT
Start: 2018-04-11 | End: 2018-04-17

## 2018-04-11 RX ORDER — ASPIRIN/CALCIUM CARB/MAGNESIUM 324 MG
1 TABLET ORAL
Qty: 0 | Refills: 0 | COMMUNITY

## 2018-04-11 RX ORDER — ATORVASTATIN CALCIUM 80 MG/1
20 TABLET, FILM COATED ORAL AT BEDTIME
Qty: 0 | Refills: 0 | Status: DISCONTINUED | OUTPATIENT
Start: 2018-04-11 | End: 2018-04-12

## 2018-04-11 RX ORDER — CLOPIDOGREL BISULFATE 75 MG/1
1 TABLET, FILM COATED ORAL
Qty: 0 | Refills: 0 | COMMUNITY

## 2018-04-11 RX ADMIN — Medication 500 MICROGRAM(S): at 06:07

## 2018-04-11 RX ADMIN — HEPARIN SODIUM 5000 UNIT(S): 5000 INJECTION INTRAVENOUS; SUBCUTANEOUS at 21:08

## 2018-04-11 RX ADMIN — AMLODIPINE BESYLATE 5 MILLIGRAM(S): 2.5 TABLET ORAL at 12:49

## 2018-04-11 RX ADMIN — Medication 40 MILLIGRAM(S): at 06:08

## 2018-04-11 RX ADMIN — Medication 37.5 MILLIGRAM(S): at 17:38

## 2018-04-11 RX ADMIN — LIDOCAINE 1 PATCH: 4 CREAM TOPICAL at 06:00

## 2018-04-11 RX ADMIN — ATORVASTATIN CALCIUM 20 MILLIGRAM(S): 80 TABLET, FILM COATED ORAL at 21:08

## 2018-04-11 RX ADMIN — Medication 37.5 MILLIGRAM(S): at 23:27

## 2018-04-11 RX ADMIN — Medication 37.5 MILLIGRAM(S): at 11:15

## 2018-04-11 RX ADMIN — Medication 5 MILLIGRAM(S): at 15:05

## 2018-04-11 RX ADMIN — CLOPIDOGREL BISULFATE 75 MILLIGRAM(S): 75 TABLET, FILM COATED ORAL at 11:15

## 2018-04-11 RX ADMIN — Medication 37.5 MILLIGRAM(S): at 00:31

## 2018-04-11 RX ADMIN — SODIUM CHLORIDE 3 MILLILITER(S): 9 INJECTION INTRAMUSCULAR; INTRAVENOUS; SUBCUTANEOUS at 21:06

## 2018-04-11 RX ADMIN — HEPARIN SODIUM 5000 UNIT(S): 5000 INJECTION INTRAVENOUS; SUBCUTANEOUS at 06:08

## 2018-04-11 RX ADMIN — LOSARTAN POTASSIUM 50 MILLIGRAM(S): 100 TABLET, FILM COATED ORAL at 17:38

## 2018-04-11 RX ADMIN — Medication 500 MICROGRAM(S): at 11:15

## 2018-04-11 RX ADMIN — Medication 100 MILLIGRAM(S): at 06:08

## 2018-04-11 RX ADMIN — TRAMADOL HYDROCHLORIDE 25 MILLIGRAM(S): 50 TABLET ORAL at 03:01

## 2018-04-11 RX ADMIN — Medication 5 MILLIGRAM(S): at 16:44

## 2018-04-11 RX ADMIN — Medication 500 MICROGRAM(S): at 17:38

## 2018-04-11 RX ADMIN — Medication 20 MILLIGRAM(S): at 06:08

## 2018-04-11 RX ADMIN — SODIUM CHLORIDE 3 MILLILITER(S): 9 INJECTION INTRAMUSCULAR; INTRAVENOUS; SUBCUTANEOUS at 15:47

## 2018-04-11 RX ADMIN — Medication 100 MILLIGRAM(S): at 21:08

## 2018-04-11 RX ADMIN — Medication 10 MILLIEQUIVALENT(S): at 11:15

## 2018-04-11 RX ADMIN — SENNA PLUS 2 TABLET(S): 8.6 TABLET ORAL at 21:08

## 2018-04-11 RX ADMIN — PANTOPRAZOLE SODIUM 40 MILLIGRAM(S): 20 TABLET, DELAYED RELEASE ORAL at 06:08

## 2018-04-11 RX ADMIN — Medication 37.5 MILLIGRAM(S): at 06:08

## 2018-04-11 RX ADMIN — Medication 10 MILLIGRAM(S): at 19:20

## 2018-04-11 RX ADMIN — Medication 81 MILLIGRAM(S): at 11:15

## 2018-04-11 RX ADMIN — SODIUM CHLORIDE 3 MILLILITER(S): 9 INJECTION INTRAMUSCULAR; INTRAVENOUS; SUBCUTANEOUS at 06:00

## 2018-04-11 NOTE — DISCHARGE NOTE ADULT - MEDICATION SUMMARY - MEDICATIONS TO TAKE
I will START or STAY ON the medications listed below when I get home from the hospital:    predniSONE 20 mg oral tablet  -- 2 tab(s) by mouth once a day, end date 4/12/18  -- Indication: For To prevent inflammation    acetaminophen 325 mg oral tablet  -- 2 tab(s) by mouth every 6 hours, As needed, Mild Pain (1 - 3)  -- Indication: For Pain    traMADol 50 mg oral tablet  -- 0.5 tab(s) by mouth every 6 hours, As needed, Severe Pain (7 - 10) MDD:2 tabs a day  -- Indication: For Severe pain    Ecotrin Adult Low Strength 81 mg oral delayed release tablet  -- 1 tab(s) by mouth once a day  -- Indication: For Heart Disease    atorvastatin 20 mg oral tablet  -- 1 tab(s) by mouth once a day (at bedtime)  -- Indication: For Cholesterol    Plavix 75 mg oral tablet  -- 1 tab(s) by mouth once a day  -- Indication: For Heart Disease/Stent    metoprolol tartrate 75 mg oral tablet  -- 1 tab(s) by mouth every 12 hours   -- It is very important that you take or use this exactly as directed.  Do not skip doses or discontinue unless directed by your doctor.  May cause drowsiness.  Alcohol may intensify this effect.  Use care when operating dangerous machinery.  Some non-prescription drugs may aggravate your condition.  Read all labels carefully.  If a warning appears, check with your doctor before taking.  Take with food or milk.  This drug may impair the ability to drive or operate machinery.  Use care until you become familiar with its effects.    -- Indication: For Blood pressure and Heart rate    furosemide 20 mg oral tablet  -- 1 tab(s) by mouth once a day. For 7 days.  -- Indication: For Diuretic to help with urination    docusate sodium 100 mg oral capsule  -- 1 cap(s) by mouth 3 times a day, As Needed -for constipation  . HOLD if loose or frequent stools.   -- Indication: For Stool softener    potassium chloride 10 mEq oral tablet, extended release  -- 1 tab(s) by mouth once a day  -- Indication: For Supplement while on Lasix I will START or STAY ON the medications listed below when I get home from the hospital:    predniSONE 20 mg oral tablet  -- 2 tab(s) by mouth once a day, end date 4/12/18  -- Indication: For To prevent inflammation    acetaminophen 325 mg oral tablet  -- 2 tab(s) by mouth every 6 hours, As needed, Mild Pain (1 - 3)  -- Indication: For Pain    traMADol 50 mg oral tablet  -- 0.5 tab(s) by mouth every 6 hours, As needed, Severe Pain (7 - 10) MDD:2 tabs a day  -- Indication: For Severe pain    Ecotrin Adult Low Strength 81 mg oral delayed release tablet  -- 1 tab(s) by mouth once a day  -- Indication: For Heart Disease    atorvastatin 20 mg oral tablet  -- 1 tab(s) by mouth once a day (at bedtime)  -- Indication: For Cholesterol    Plavix 75 mg oral tablet  -- 1 tab(s) by mouth once a day  -- Indication: For Heart Disease/Stent    metoprolol tartrate 75 mg oral tablet  -- 1 tab(s) by mouth every 12 hours   -- It is very important that you take or use this exactly as directed.  Do not skip doses or discontinue unless directed by your doctor.  May cause drowsiness.  Alcohol may intensify this effect.  Use care when operating dangerous machinery.  Some non-prescription drugs may aggravate your condition.  Read all labels carefully.  If a warning appears, check with your doctor before taking.  Take with food or milk.  This drug may impair the ability to drive or operate machinery.  Use care until you become familiar with its effects.    -- Indication: For Blood pressure and Heart rate    amLODIPine 5 mg oral tablet  -- 1 tab(s) by mouth once a day  -- Indication: For blood pressure    furosemide 20 mg oral tablet  -- 1 tab(s) by mouth once a day. For 7 days.  -- Indication: For Diuretic to help with urination    docusate sodium 100 mg oral capsule  -- 1 cap(s) by mouth 3 times a day, As Needed -for constipation  . HOLD if loose or frequent stools.   -- Indication: For Stool softener    potassium chloride 10 mEq oral tablet, extended release  -- 1 tab(s) by mouth once a day  -- Indication: For Supplement while on Lasix I will START or STAY ON the medications listed below when I get home from the hospital:    Ecotrin Adult Low Strength 81 mg oral delayed release tablet  -- 1 tab(s) by mouth once a day  -- Indication: For Heart Disease    acetaminophen 325 mg oral tablet  -- 2 tab(s) by mouth every 6 hours, As needed, Mild Pain (1 - 3)  -- Indication: For Pain    traMADol 50 mg oral tablet  -- 0.5 tab(s) by mouth every 6 hours, As needed, Severe Pain (7 - 10) MDD:2 tabs a day  -- Indication: For Severe pain    Cozaar 50 mg oral tablet  -- 1 tab(s) by mouth once a day  -- Indication: For Blood pressure.     atorvastatin 20 mg oral tablet  -- 1 tab(s) by mouth once a day (at bedtime)  -- Indication: For Cholesterol    Plavix 75 mg oral tablet  -- 1 tab(s) by mouth once a day  -- Indication: For Heart Disease/Stent    metoprolol tartrate 75 mg oral tablet  -- 1 tab(s) by mouth every 12 hours   -- It is very important that you take or use this exactly as directed.  Do not skip doses or discontinue unless directed by your doctor.  May cause drowsiness.  Alcohol may intensify this effect.  Use care when operating dangerous machinery.  Some non-prescription drugs may aggravate your condition.  Read all labels carefully.  If a warning appears, check with your doctor before taking.  Take with food or milk.  This drug may impair the ability to drive or operate machinery.  Use care until you become familiar with its effects.    -- Indication: For Blood pressure and Heart rate    amLODIPine 5 mg oral tablet  -- 1 tab(s) by mouth once a day  -- Indication: For blood pressure    furosemide 20 mg oral tablet  -- 1 tab(s) by mouth once a day. For 7 days.  -- Indication: For Diuretic to help with urination    docusate sodium 100 mg oral capsule  -- 1 cap(s) by mouth 3 times a day, As Needed -for constipation  . HOLD if loose or frequent stools.   -- Indication: For Stool softener I will START or STAY ON the medications listed below when I get home from the hospital:    Ecotrin Adult Low Strength 81 mg oral delayed release tablet  -- 1 tab(s) by mouth once a day  -- Indication: For Heart Disease    acetaminophen 325 mg oral tablet  -- 2 tab(s) by mouth every 6 hours, As needed, Mild Pain (1 - 3)  -- Indication: For Pain    traMADol 50 mg oral tablet  -- 0.5 tab(s) by mouth every 6 hours, As needed, Severe Pain (7 - 10) MDD:2 tabs a day  -- Indication: For Severe pain    Cozaar 50 mg oral tablet  -- 1 tab(s) by mouth once a day  -- Indication: For Blood pressure    atorvastatin 20 mg oral tablet  -- 1 tab(s) by mouth once a day (at bedtime)  -- Indication: For Cholesterol    Plavix 75 mg oral tablet  -- 1 tab(s) by mouth once a day  -- Indication: For Heart Disease/Stent    metoprolol tartrate 75 mg oral tablet  -- 1 tab(s) by mouth every 12 hours   -- It is very important that you take or use this exactly as directed.  Do not skip doses or discontinue unless directed by your doctor.  May cause drowsiness.  Alcohol may intensify this effect.  Use care when operating dangerous machinery.  Some non-prescription drugs may aggravate your condition.  Read all labels carefully.  If a warning appears, check with your doctor before taking.  Take with food or milk.  This drug may impair the ability to drive or operate machinery.  Use care until you become familiar with its effects.    -- Indication: For Blood pressure and Heart rate    amLODIPine 5 mg oral tablet  -- 1 tab(s) by mouth once a day  -- Indication: For blood pressure    furosemide 20 mg oral tablet  -- 1 tab(s) by mouth once a day. For 7 days.  -- Indication: For Diuretic to help with urination    docusate sodium 100 mg oral capsule  -- 1 cap(s) by mouth 3 times a day, As Needed -for constipation  . HOLD if loose or frequent stools.   -- Indication: For Stool softener

## 2018-04-11 NOTE — DISCHARGE NOTE ADULT - CARE PROVIDERS DIRECT ADDRESSES
[email or web address]

## 2018-04-11 NOTE — PROGRESS NOTE ADULT - SUBJECTIVE AND OBJECTIVE BOX
Patient discussed on morning rounds with Dr. Murrieta.    Operation / Date: MIDCAB x1 LIMA to LAD on 4/5/18.    Surgeon: Dr. Fernando Murrieta    Referring Physician: Dr. Kj Cortes.    SUBJECTIVE ASSESSMENT AND HOSPITAL COURSE:  Patient OOBTC, ambulating well, eating well, last bowel movement on Sunday 4/8/18. Patient using IS, reaching 500mL. Patient walked twice yesterday down the olson and did stairs. Pain is controlled. Denies SOB, Chest pain, N/V/D, dizziness. Patient states that she feels ready to go home.     Patient is a 66 y/o female strong FHX of CAD w/ PMHx of HTN, Hyperlipidemia, known CAD s/p PCI CHENG mLAD (99%) 7/16/13 at [facility] who presented to cardiologist Dr. Cortes for recurrent exertional chest pain.  NST 12/21/2017: EF: 67%, J point depression 0.5 to 1mm in inferolateral leads w/ upsloping ST segment, normal myocardial perfusion however patient experienced chest pain at peak exercise which resolved during recovery. Stress test terminated due to chest pain. Cardiac cath done which revealed severe CAD. Was found to have LM 70%. oLAD 80%. mLAD 50%. oLCX 70% (dominant). RCA 50% on 4/3/18 cardiac cath.     4/5/18: Patient underwent MIDCAB x1 LIMA to LAD. EF normal. OR uneventful, patient arrived to ICU extubated. Uncomplicated ICU course transferred to  on POD#1.  4/6/18: Patient was hypertensive, treated with IV Labetalol and Amlodipine.   4/7/18: Bilateral atelectasis on CXR. Bedside ultrasound confirmed no pleural effusion. Was on BiPAP at night.   4/9/18: HFNC weaned to 6LNC  4/10/18: Weaned off O2. Ultrasound repeated, no significant effusions.   4/11/18: Pain controlled, patient ambulating and eating well, on room air, stable for discharge per Dr. Murrieta.       Vital Signs Last 24 Hrs  T(C): 36 (11 Apr 2018 10:00), Max: 37.2 (11 Apr 2018 05:00)  T(F): 96.8 (11 Apr 2018 10:00), Max: 98.9 (11 Apr 2018 05:00)  HR: 76 (11 Apr 2018 11:06) (64 - 90)  BP: 147/71 (11 Apr 2018 11:06) (107/49 - 161/69)  BP(mean): 95 (11 Apr 2018 11:06) (72 - 108)  RR: 14 (11 Apr 2018 09:00) (12 - 21)  SpO2: 95% (11 Apr 2018 09:00) (92% - 97%)    EPICARDIAL WIRES REMOVED: N/A  TIE DOWNS REMOVED: Yes    PHYSICAL EXAM:    General: OOBTC, NAD, looks well, breathing on room air, looks comfortable, speaking in full sentences.     Neurological: A&Ox3. No focal deficits.     Cardiovascular: S1S2, no murmurs, gallops or rubs.     Respiratory: Diminished breath sounds at bases bilaterally. No wheezes, rales or rhonchi.     Gastrointestinal: Abdomen soft, non-distended, non-tender.    Extremities: Warm to touch bilaterally. No peripheral edema.     Vascular: Pedal pulses palpable.     Incision Sites: Left thoracotomy incision, no drainage, clean and dry, no redness.     LABS:                        11.4   6.4   )-----------( 420      ( 11 Apr 2018 07:05 )             36.0       COUMADIN:  No           04-11    134<L>  |  94<L>  |  18  ----------------------------<  162<H>  4.8   |  23  |  0.77    Ca    9.8      11 Apr 2018 07:05  Mg     2.3     04-11            Discharge CXR: < from: Xray Chest 1 View- PORTABLE-Urgent (04.09.18 @ 18:06) >    IMPRESSION:  Stable bilateral pleural effusions and bilateral lower lobe   atelectasis/infiltrates.    No pneumothorax.    < end of copied text >    4/11/18: PA/Lat showed bilateral haziness at lung bases, ultrasound performed yesterday, no significant effusions.     Discharge ECHO: Not performed.

## 2018-04-11 NOTE — DISCHARGE NOTE ADULT - HOSPITAL COURSE
Patient is a 68 y/o female strong FHX of CAD w/ PMHx of HTN, Hyperlipidemia, known CAD s/p PCI CHENG mLAD (99%) 7/16/13 at [facility] who presented to cardiologist Dr. Cortes for recurrent exertional chest pain.  NST 12/21/2017: EF: 67%, J point depression 0.5 to 1mm in inferolateral leads w/ upsloping ST segment, normal myocardial perfusion however patient experienced chest pain at peak exercise which resolved during recovery. Stress test terminated due to chest pain. Cardiac cath done which revealed severe CAD. Was found to have LM 70%. oLAD 80%. mLAD 50%. oLCX 70% (dominant). RCA 50% on 4/3/18 cardiac cath.     4/5/18: Patient underwent MIDCAB x1 LIMA to LAD. EF normal. OR uneventful, patient arrived to ICU extubated. Uncomplicated ICU course transferred to  on POD#1.  4/6/18: Patient was hypertensive, treated with IV Labetalol and Amlodipine.   4/7/18: Bilateral atelectasis on CXR. Bedside ultrasound confirmed no pleural effusion. Was on BiPAP at night.   4/9/18: HFNC weaned to 6LNC  4/10/18: Weaned off O2. Ultrasound repeated, no significant effusions.   4/11/18: Pain controlled, patient ambulating and eating well, on room air, stable for discharge per Dr. Murrieta. Patient is a 66 y/o female strong FHX of CAD w/ PMHx of HTN, Hyperlipidemia, known CAD s/p PCI CHENG mLAD (99%) 7/16/13 at [facility] who presented to cardiologist Dr. Cortes for recurrent exertional chest pain.  NST 12/21/2017: EF: 67%, J point depression 0.5 to 1mm in inferolateral leads w/ upsloping ST segment, normal myocardial perfusion however patient experienced chest pain at peak exercise which resolved during recovery. Stress test terminated due to chest pain. Cardiac cath done which revealed severe CAD. Was found to have LM 70%. oLAD 80%. mLAD 50%. oLCX 70% (dominant). RCA 50% on 4/3/18 cardiac cath.     4/5/18: Patient underwent MIDCAB x1 LIMA to LAD. EF normal. OR uneventful, patient arrived to ICU extubated. Uncomplicated ICU course transferred to  on POD#1.  4/6/18: Patient was hypertensive, treated with IV Labetalol and Amlodipine.   4/7/18: Bilateral atelectasis on CXR. Bedside ultrasound confirmed no pleural effusion. Was on BiPAP at night.   4/9/18: HFNC weaned to 6LNC  4/10/18: Weaned off O2. Ultrasound repeated, no significant effusions.   4/11/18: Pain controlled, patient ambulating and eating well, on room air, stable for discharge per Dr. Murrieta.   4/12/18: Discharge postponed yesterday due to high BPs in the 190s/100s. Patient was restarted on home meds, Norvasc and Losartan. Also given IV Hydralazine. BP today is now controlled, patient ready for discharge. Potassium was discontinued today due to elevated levels, will follow up in outpt. Patient to continue on Lasix.

## 2018-04-11 NOTE — DISCHARGE NOTE ADULT - MEDICATION SUMMARY - MEDICATIONS TO STOP TAKING
I will STOP taking the medications listed below when I get home from the hospital:    losartan 50 mg oral tablet  -- 1 tab(s) by mouth once a day    Ranexa 500 mg oral tablet, extended release  -- 1 tab(s) by mouth 2 times a day    amLODIPine 2.5 mg oral tablet  -- 1 tab(s) by mouth once a day    isosorbide mononitrate 120 mg oral tablet, extended release  -- 1 tab(s) by mouth once a day (in the morning)    metoprolol succinate 50 mg oral tablet, extended release  -- 1 tab(s) by mouth once a day I will STOP taking the medications listed below when I get home from the hospital:    Ranexa 500 mg oral tablet, extended release  -- 1 tab(s) by mouth 2 times a day    amLODIPine 2.5 mg oral tablet  -- 1 tab(s) by mouth once a day    isosorbide mononitrate 120 mg oral tablet, extended release  -- 1 tab(s) by mouth once a day (in the morning)    metoprolol succinate 50 mg oral tablet, extended release  -- 1 tab(s) by mouth once a day

## 2018-04-11 NOTE — CHART NOTE - NSCHARTNOTEFT_GEN_A_CORE
Patient's BP past couple of hours has been elevated, as high as 190's/100.  Pt admits to feeling nervous about her daughter calling us and asking us questions.  She says she's worried that her daughter may be "holding up her discharge".  She still feels well, denies any c/o at this time.  Breathing on room air.  Gave 5mg IV hydralazine, and SBP went from 180-->160.  Will give another 5mg IV hydralizine now.  Started Norvasc today.  She also takes ARB at home.  ?Resume also before she leaves.  Will see how her BP does over the next hour.  Still plan for D/C home today.  Home visiting RN and [facility] to see her at home. Patient's BP past couple of hours has been elevated, as high as 190's/100.  Pt admits to feeling nervous about her daughter calling us and asking us questions.  She says she's worried that her daughter may be "holding up her discharge".  She still feels well, denies any c/o at this time.  Breathing on room air.  Gave 5mg IV hydralazine, and SBP went from 180-->160.  Will give another 5mg IV hydralizine now.  Started Norvasc today.  She also takes ARB at home.  ?Resume also before she leaves.  Will see how her BP does over the next hour.  Still plan for D/C home today.  Home visiting RN and [facility] to see her at home.    Addendum at 5pm.  Pt BP still high (170 systolic).  Resuming home ARB.  Discussed with Dr. Harding.  Will likely keep patient here overngiht for BP control.  Dr. Murrieta aware and agrees.

## 2018-04-11 NOTE — PROGRESS NOTE ADULT - ASSESSMENT
Patient is POD#6 MIDCAB x1 LIMA to LAD, ambulating and eating well, bowel movements, stable for discharge home per Dr. Murrieta. Follow ups with Dr. Murrieta and Dr. Cortes. Patient going home on Lasix PO. Patient started on a 3-day course of Prednisone per Dr. Murrieta to prevent pleural pericarditis. Patient's SBP running 150's, HR controlled in the 70s, resumed home Norvasc.

## 2018-04-11 NOTE — DISCHARGE NOTE ADULT - PLAN OF CARE
To recover from surgery -Please follow up with Dr. Murrieta on Tuesday, 4/17/18 at 11:00am.  The office is located at [facility], [facility], 4th floor. Call us with any questions #[phone].    -Walk daily as tolerated and use your incentive spirometer every hour.    -No driving or strenuous activity/exercise for 6 weeks, or until cleared by your surgeon.    -Gently clean your incisions with anti-bacterial soap and water, pat dry.  You may leave them open to air.    -Call your doctor if you have shortness of breath, chest pain not relieved by pain medication, dizziness, fever >101.5, or increased redness or drainage from incisions. Cardiology Follow up Please follow up with Dr. Kj Cortes on Thursday, 4/19/18 at 10:00am. Address and phone number below.    [address]: [place], [place]: [phone] Blood pressure control PLEASE MAKE SURE TO STAGGER BLOOD PRESSURE MEDICATIONS (metoprolol, amlodipine, and losartan).

## 2018-04-11 NOTE — DISCHARGE NOTE ADULT - CARE PROVIDER_API CALL
Fernando Murrieta (MD), Cardiovascular Surgery  [address]  [address]th [place] [address]  Phone: [phone]  Fax: [phone]    Kj Cortes (), Cardiovascular Disease; Internal Medicine; Interventional Cardiology  [address] [address]  Phone: [phone]  Fax: [phone]

## 2018-04-11 NOTE — DISCHARGE NOTE ADULT - CARE PLAN
Principal Discharge DX:	Coronary artery disease  Goal:	To recover from surgery  Secondary Diagnosis:	Essential hypertension Principal Discharge DX:	Coronary artery disease  Goal:	To recover from surgery  Assessment and plan of treatment:	-Please follow up with Dr. Murrieta on Tuesday, 4/17/18 at 11:00am.  The office is located at [facility], [facility], 4th floor. Call us with any questions #[phone].    -Walk daily as tolerated and use your incentive spirometer every hour.    -No driving or strenuous activity/exercise for 6 weeks, or until cleared by your surgeon.    -Gently clean your incisions with anti-bacterial soap and water, pat dry.  You may leave them open to air.    -Call your doctor if you have shortness of breath, chest pain not relieved by pain medication, dizziness, fever >101.5, or increased redness or drainage from incisions.  Secondary Diagnosis:	Essential hypertension Principal Discharge DX:	Coronary artery disease  Goal:	To recover from surgery  Assessment and plan of treatment:	-Please follow up with Dr. Murrieta on Tuesday, 4/17/18 at 11:00am.  The office is located at [facility], [facility], 4th floor. Call us with any questions #[phone].    -Walk daily as tolerated and use your incentive spirometer every hour.    -No driving or strenuous activity/exercise for 6 weeks, or until cleared by your surgeon.    -Gently clean your incisions with anti-bacterial soap and water, pat dry.  You may leave them open to air.    -Call your doctor if you have shortness of breath, chest pain not relieved by pain medication, dizziness, fever >101.5, or increased redness or drainage from incisions.  Secondary Diagnosis:	Essential hypertension  Goal:	Cardiology Follow up  Assessment and plan of treatment:	Please follow up with Dr. Kj Cortes on Thursday, 4/19/18 at 10:00am. Address and phone number below.    [address]: [place], [place]: [phone] Principal Discharge DX:	Coronary artery disease  Goal:	To recover from surgery  Assessment and plan of treatment:	-Please follow up with Dr. Murrieta on Tuesday, 4/17/18 at 11:00am.  The office is located at [facility], [facility], 4th floor. Call us with any questions #[phone].    -Walk daily as tolerated and use your incentive spirometer every hour.    -No driving or strenuous activity/exercise for 6 weeks, or until cleared by your surgeon.    -Gently clean your incisions with anti-bacterial soap and water, pat dry.  You may leave them open to air.    -Call your doctor if you have shortness of breath, chest pain not relieved by pain medication, dizziness, fever >101.5, or increased redness or drainage from incisions.  Secondary Diagnosis:	Essential hypertension  Goal:	Cardiology Follow up  Assessment and plan of treatment:	Please follow up with Dr. Kj Cortes on Thursday, 4/19/18 at 10:00am. Address and phone number below.    [address] [facility] [address]: [place], [place]: [phone]  Goal:	Blood pressure control  Assessment and plan of treatment:	PLEASE MAKE SURE TO STAGGER BLOOD PRESSURE MEDICATIONS (metoprolol, amlodipine, and losartan).

## 2018-04-11 NOTE — DISCHARGE NOTE ADULT - PATIENT PORTAL LINK FT
You can access the [facility] Patient Portal, offered by [facility], by registering with the following website: [email or web address]

## 2018-04-12 VITALS — HEIGHT: 63 IN | BODY MASS INDEX: 30.12 KG/M2 | WEIGHT: 170 LBS

## 2018-04-12 VITALS — TEMPERATURE: 97 F

## 2018-04-12 PROBLEM — Z09 POSTOP CHECK: Status: ACTIVE | Noted: 2018-04-12

## 2018-04-12 PROBLEM — Z86.39 HISTORY OF HYPERLIPIDEMIA: Status: RESOLVED | Noted: 2018-04-12 | Resolved: 2018-04-12

## 2018-04-12 PROBLEM — Z82.49 FAMILY HISTORY OF CORONARY ARTERY DISEASE: Status: ACTIVE | Noted: 2018-04-12

## 2018-04-12 PROBLEM — Z95.5 PRESENCE OF STENT IN LAD CORONARY ARTERY: Status: RESOLVED | Noted: 2018-04-12 | Resolved: 2018-04-12

## 2018-04-12 PROCEDURE — C1889: CPT

## 2018-04-12 PROCEDURE — 82803 BLOOD GASES ANY COMBINATION: CPT

## 2018-04-12 PROCEDURE — 93306 TTE W/DOPPLER COMPLETE: CPT

## 2018-04-12 PROCEDURE — 36415 COLL VENOUS BLD VENIPUNCTURE: CPT

## 2018-04-12 PROCEDURE — 70450 CT HEAD/BRAIN W/O DYE: CPT

## 2018-04-12 PROCEDURE — 84484 ASSAY OF TROPONIN QUANT: CPT

## 2018-04-12 PROCEDURE — 85730 THROMBOPLASTIN TIME PARTIAL: CPT

## 2018-04-12 PROCEDURE — 85025 COMPLETE CBC W/AUTO DIFF WBC: CPT

## 2018-04-12 PROCEDURE — 82550 ASSAY OF CK (CPK): CPT

## 2018-04-12 PROCEDURE — 82962 GLUCOSE BLOOD TEST: CPT

## 2018-04-12 PROCEDURE — 71045 X-RAY EXAM CHEST 1 VIEW: CPT

## 2018-04-12 PROCEDURE — 86140 C-REACTIVE PROTEIN: CPT

## 2018-04-12 PROCEDURE — 86901 BLOOD TYPING SEROLOGIC RH(D): CPT

## 2018-04-12 PROCEDURE — 83735 ASSAY OF MAGNESIUM: CPT

## 2018-04-12 PROCEDURE — 93005 ELECTROCARDIOGRAM TRACING: CPT

## 2018-04-12 PROCEDURE — 80061 LIPID PANEL: CPT

## 2018-04-12 PROCEDURE — 80048 BASIC METABOLIC PNL TOTAL CA: CPT

## 2018-04-12 PROCEDURE — C1769: CPT

## 2018-04-12 PROCEDURE — 94640 AIRWAY INHALATION TREATMENT: CPT

## 2018-04-12 PROCEDURE — 81003 URINALYSIS AUTO W/O SCOPE: CPT

## 2018-04-12 PROCEDURE — 80053 COMPREHEN METABOLIC PANEL: CPT

## 2018-04-12 PROCEDURE — S2900: CPT

## 2018-04-12 PROCEDURE — P9045: CPT

## 2018-04-12 PROCEDURE — 86900 BLOOD TYPING SEROLOGIC ABO: CPT

## 2018-04-12 PROCEDURE — 82330 ASSAY OF CALCIUM: CPT

## 2018-04-12 PROCEDURE — 85610 PROTHROMBIN TIME: CPT

## 2018-04-12 PROCEDURE — 84132 ASSAY OF SERUM POTASSIUM: CPT

## 2018-04-12 PROCEDURE — 84295 ASSAY OF SERUM SODIUM: CPT

## 2018-04-12 PROCEDURE — 97162 PT EVAL MOD COMPLEX 30 MIN: CPT

## 2018-04-12 PROCEDURE — 83605 ASSAY OF LACTIC ACID: CPT

## 2018-04-12 PROCEDURE — C1887: CPT

## 2018-04-12 PROCEDURE — 83880 ASSAY OF NATRIURETIC PEPTIDE: CPT

## 2018-04-12 PROCEDURE — 86923 COMPATIBILITY TEST ELECTRIC: CPT

## 2018-04-12 PROCEDURE — 84443 ASSAY THYROID STIM HORMONE: CPT

## 2018-04-12 PROCEDURE — 97116 GAIT TRAINING THERAPY: CPT

## 2018-04-12 PROCEDURE — 80076 HEPATIC FUNCTION PANEL: CPT

## 2018-04-12 PROCEDURE — 94660 CPAP INITIATION&MGMT: CPT

## 2018-04-12 PROCEDURE — 83036 HEMOGLOBIN GLYCOSYLATED A1C: CPT

## 2018-04-12 PROCEDURE — 86850 RBC ANTIBODY SCREEN: CPT

## 2018-04-12 PROCEDURE — 82553 CREATINE MB FRACTION: CPT

## 2018-04-12 PROCEDURE — 94150 VITAL CAPACITY TEST: CPT

## 2018-04-12 PROCEDURE — 85027 COMPLETE CBC AUTOMATED: CPT

## 2018-04-12 PROCEDURE — 93880 EXTRACRANIAL BILAT STUDY: CPT

## 2018-04-12 PROCEDURE — 71046 X-RAY EXAM CHEST 2 VIEWS: CPT

## 2018-04-12 PROCEDURE — 84100 ASSAY OF PHOSPHORUS: CPT

## 2018-04-12 RX ORDER — ASPIRIN 81 MG
81 TABLET, DELAYED RELEASE (ENTERIC COATED) ORAL DAILY
Qty: 30 | Refills: 6 | Status: ACTIVE | COMMUNITY

## 2018-04-12 RX ORDER — LOSARTAN POTASSIUM 100 MG/1
1 TABLET, FILM COATED ORAL
Qty: 30 | Refills: 0 | OUTPATIENT
Start: 2018-04-12 | End: 2018-05-11

## 2018-04-12 RX ORDER — AMLODIPINE BESYLATE 5 MG/1
5 TABLET ORAL
Qty: 90 | Refills: 0 | Status: ACTIVE | COMMUNITY

## 2018-04-12 RX ADMIN — SODIUM CHLORIDE 3 MILLILITER(S): 9 INJECTION INTRAMUSCULAR; INTRAVENOUS; SUBCUTANEOUS at 06:54

## 2018-04-12 RX ADMIN — HEPARIN SODIUM 5000 UNIT(S): 5000 INJECTION INTRAVENOUS; SUBCUTANEOUS at 06:52

## 2018-04-12 RX ADMIN — AMLODIPINE BESYLATE 5 MILLIGRAM(S): 2.5 TABLET ORAL at 06:52

## 2018-04-12 RX ADMIN — LIDOCAINE 1 PATCH: 4 CREAM TOPICAL at 12:34

## 2018-04-12 RX ADMIN — SODIUM CHLORIDE 3 MILLILITER(S): 9 INJECTION INTRAMUSCULAR; INTRAVENOUS; SUBCUTANEOUS at 12:49

## 2018-04-12 RX ADMIN — Medication 37.5 MILLIGRAM(S): at 12:27

## 2018-04-12 RX ADMIN — Medication 100 MILLIGRAM(S): at 06:53

## 2018-04-12 RX ADMIN — Medication 37.5 MILLIGRAM(S): at 06:52

## 2018-04-12 RX ADMIN — PANTOPRAZOLE SODIUM 40 MILLIGRAM(S): 20 TABLET, DELAYED RELEASE ORAL at 06:52

## 2018-04-12 RX ADMIN — Medication 500 MICROGRAM(S): at 06:56

## 2018-04-12 RX ADMIN — Medication 650 MILLIGRAM(S): at 12:34

## 2018-04-12 RX ADMIN — TRAMADOL HYDROCHLORIDE 25 MILLIGRAM(S): 50 TABLET ORAL at 00:35

## 2018-04-12 RX ADMIN — LOSARTAN POTASSIUM 50 MILLIGRAM(S): 100 TABLET, FILM COATED ORAL at 07:50

## 2018-04-12 RX ADMIN — Medication 500 MICROGRAM(S): at 12:27

## 2018-04-12 RX ADMIN — Medication 40 MILLIGRAM(S): at 06:52

## 2018-04-12 RX ADMIN — CLOPIDOGREL BISULFATE 75 MILLIGRAM(S): 75 TABLET, FILM COATED ORAL at 12:27

## 2018-04-12 RX ADMIN — Medication 20 MILLIGRAM(S): at 06:56

## 2018-04-12 RX ADMIN — Medication 81 MILLIGRAM(S): at 12:27

## 2018-04-12 RX ADMIN — TRAMADOL HYDROCHLORIDE 25 MILLIGRAM(S): 50 TABLET ORAL at 06:48

## 2018-04-12 RX ADMIN — Medication 650 MILLIGRAM(S): at 12:43

## 2018-04-12 RX ADMIN — Medication 500 MICROGRAM(S): at 04:30

## 2018-04-12 RX ADMIN — POLYETHYLENE GLYCOL 3350 17 GRAM(S): 17 POWDER, FOR SOLUTION ORAL at 12:28

## 2018-04-12 NOTE — CHART NOTE - NSCHARTNOTEFT_GEN_A_CORE
Admitting Diagnosis:   Patient is a 67y old  Female who presents with a chief complaint of Coronary Artery Disease (11 Apr 2018 10:21)      PAST MEDICAL & SURGICAL HISTORY:  Coronary artery disease  Hyperlipidemia  Hypertension  H/O shoulder surgery  History of percutaneous coronary intervention      Current Nutrition Order:  Dash/TLC     PO Intake: Good (%) [X]  Fair (50-75%) [   ] Poor (<25%) [   ]    GI Issues:   Denies     Pain:  Denies     Skin Integrity:  R wrist ASW  L ant chest ASW    Labs:   04-11    134<L>  |  94<L>  |  18  ----------------------------<  162<H>  4.8   |  23  |  0.77    Ca    9.8      11 Apr 2018 07:05  Mg     2.3     04-11      CAPILLARY BLOOD GLUCOSE          Medications:  MEDICATIONS  (STANDING):  amLODIPine   Tablet 5 milliGRAM(s) Oral daily  aspirin enteric coated 81 milliGRAM(s) Oral daily  atorvastatin 20 milliGRAM(s) Oral at bedtime  clopidogrel Tablet 75 milliGRAM(s) Oral daily  docusate sodium 100 milliGRAM(s) Oral three times a day  furosemide    Tablet 20 milliGRAM(s) Oral daily  heparin  Injectable 5000 Unit(s) SubCutaneous every 8 hours  ipratropium    for Nebulization 500 MICROGram(s) Nebulizer every 6 hours  lidocaine   Patch 1 Patch Transdermal daily  losartan 50 milliGRAM(s) Oral daily  metoprolol tartrate 37.5 milliGRAM(s) Oral every 6 hours  pantoprazole    Tablet 40 milliGRAM(s) Oral before breakfast  polyethylene glycol 3350 17 Gram(s) Oral daily  senna 2 Tablet(s) Oral at bedtime  sodium chloride 0.45%. 1000 milliLiter(s) (10 mL/Hr) IV Continuous <Continuous>  sodium chloride 0.9% lock flush 3 milliLiter(s) IV Push every 8 hours    MEDICATIONS  (PRN):  acetaminophen   Tablet. 650 milliGRAM(s) Oral every 6 hours PRN Mild Pain (1 - 3)  traMADol 25 milliGRAM(s) Oral every 6 hours PRN Severe Pain (7 - 10)      Weight:  82.6kg (4/3)  82.6kg (4/4)  81.9kg (4/5)    Weight Change:   Continue to trend     Estimated energy needs:   IBW 52.3kg used for EER and adjusted for post-op  25-30kcal/kg (1307-1569kcal), 1.2-1.4g/kg (63-73g), 30-35ml/kg (1569-1830ml)    Subjective:   68y/o F s/p MIDCAB. Pt seen resting in bed on the phone. She reports eating well and denies GI distress and pain. Pt reports she wants to go home and was for d/c yesterday. Attempted to reinforce Dash/TLC diet education, but pt declines. Will follow.    Previous Nutrition Diagnosis:  Increased nutrient needs RT increased demand AEB post-op    Active [X]  Resolved [   ]    If resolved, new PES:     Goal:  Pt to demonstrate diet compliance and consume at least 75% of EER     Recommendations:  1. Continue current diet.   2. Monitor lytes and replete prn.     Education:   Pt declined    Risk Level: High [   ] Moderate [X] Low [   ]

## 2018-04-12 NOTE — PROGRESS NOTE ADULT - SUBJECTIVE AND OBJECTIVE BOX
Patient discussed on morning rounds with Dr. Murrieta.    Operation / Date: MIDCAB x1 LIMA to LAD on 4/5/18.    Surgeon: Dr. Fernando Murrieta    Referring Physician: Dr. Kj Cortes.    SUBJECTIVE ASSESSMENT AND HOSPITAL COURSE:  Patient OOBTC, ambulating well, eating well, bowel movement today. Patient using IS, reaching 500mL. Patient walked twice yesterday down the [place]son and did stairs. Pain is controlled. Denies SOB, Chest pain, N/V/D, dizziness. Patient states that she feels ready to go home today.     Patient is a 66 y/o female strong FHX of CAD w/ PMHx of HTN, Hyperlipidemia, known CAD s/p PCI CHENG mLAD (99%) 7/16/13 at [facility] who presented to cardiologist Dr. Cortes for recurrent exertional chest pain.  NST 12/21/2017: EF: 67%, J point depression 0.5 to 1mm in inferolateral leads w/ upsloping ST segment, normal myocardial perfusion however patient experienced chest pain at peak exercise which resolved during recovery. Stress test terminated due to chest pain. Cardiac cath done which revealed severe CAD. Was found to have LM 70%. oLAD 80%. mLAD 50%. oLCX 70% (dominant). RCA 50% on 4/3/18 cardiac cath.     4/5/18: Patient underwent MIDCAB x1 LIMA to LAD. EF normal. OR uneventful, patient arrived to ICU extubated. Uncomplicated ICU course transferred to  on POD#1.  4/6/18: Patient was hypertensive, treated with IV Labetalol and Amlodipine.   4/7/18: Bilateral atelectasis on CXR. Bedside ultrasound confirmed no pleural effusion. Was on BiPAP at night.   4/9/18: HFNC weaned to 6LNC  4/10/18: Weaned off O2. Ultrasound repeated, no significant effusions.   4/11/18: Pain controlled, patient ambulating and eating well, on room air, stable for discharge per Dr. Murrieta.   4/12/18: Discharge postponed due to high BPs in the 190s/100s. Patient was restarted on home meds, Norvasc and Losartan. Also given IV Hydralazine.       Vital Signs Last 24 Hrs  T(C): 35.9 (12 Apr 2018 09:51), Max: 37.2 (12 Apr 2018 01:00)  T(F): 96.6 (12 Apr 2018 09:51), Max: 98.9 (12 Apr 2018 01:00)  HR: 82 (12 Apr 2018 09:42) (74 - 98)   BP: 138/62 (12 Apr 2018 09:42) (136/53 - 192/78)  BP(mean): 91 (12 Apr 2018 09:42) (75 - 162)  RR: 16 (12 Apr 2018 09:42) (14 - 16)  SpO2: 97% (12 Apr 2018 09:42) (95% - 99%)    EPICARDIAL WIRES REMOVED: N/A  TIE DOWNS REMOVED: Yes    PHYSICAL EXAM:    General: OOBTC, NAD, breathing on room air, ready to go home.     Neurological: A&Ox3, no focal deficits.    Cardiovascular: S1S2, no murmurs, rubs or gallops.    Respiratory: Poor inspiratory effort. Clear to auscultation. No wheezes, rales or rhonchi.    Gastrointestinal: Soft, non-tender, non-distended.    Extremities: No peripheral edema.    Vascular: warm to touch, palpable pedal pulses bilaterally.     Incision Sites: Dry, intact, no edema or redness.     LABS:                        11.4   6.4   )-----------( 420      ( 11 Apr 2018 07:05 )             36.0       COUMADIN:  No.               04-11    134<L>  |  94<L>  |  18  ----------------------------<  162<H>  4.8   |  23  |  0.77    Ca    9.8      11 Apr 2018 07:05  Mg     2.3     04-11            Discharge CXR: < from: Xray Chest 1 View- PORTABLE-Urgent (04.09.18 @ 18:06) >  IMPRESSION:  Stable bilateral pleural effusions and bilateral lower lobe   atelectasis/infiltrates.    No pneumothorax.    4/11/18: PA/Lat showed bilateral haziness at lung bases, ultrasound performed yesterday, no significant effusions.     Discharge ECHO: Not performed.

## 2018-04-12 NOTE — PROGRESS NOTE ADULT - ASSESSMENT
Patient is POD#7 MIDCAB x1 LIMA to LAD, ambulating and eating well, +bowel movements, stable for discharge home per Dr. Murrieta. Discharge was postponed yesterday due to high BPs in the 190s/100s. Patient was given IV hydralazine for acute episode, and restarted on home meds, Norvasc and Losartan, HR controlled in 70s. Follow ups scheduled with Dr. Murrieta and Dr. Cortes. Patient going home on Lasix PO. Patient started on a 3-day course of Prednisone per Dr. Murrieta to prevent pleural pericarditis, has one more day left. Potassium supplements were discontinued today due to elevated levels, will reassess in follow up visit. Patient ready to be discharged. "Follow your heart" team will be seeing patient at home. Patient is POD#7 MIDCAB x1 LIMA to LAD, ambulating and eating well, +bowel movements, stable for discharge home per Dr. Murrieta. Discharge was postponed yesterday due to high BPs in the 190s/100s. Patient was given IV hydralazine for acute episode, and restarted on home meds, Norvasc and Losartan, HR controlled in 70s. Follow ups scheduled with Dr. Murrieta and Dr. Cortes. Patient going home on Lasix PO. Patient started on a 3-day course of Prednisone per Dr. Murrieta to prevent pleural pericarditis, will receive last dose today before discharge. Potassium supplements were discontinued today due to elevated levels, will reassess in follow up visit. Patient ready to be discharged. "Follow your heart" team will be seeing patient at home.

## 2018-04-16 ENCOUNTER — FORM ENCOUNTER (OUTPATIENT)
Age: 68
End: 2018-04-16

## 2018-04-17 ENCOUNTER — OUTPATIENT (OUTPATIENT)
Dept: OUTPATIENT SERVICES | Facility: HOSPITAL | Age: 68
LOS: 1 days | End: 2018-04-17
Payer: COMMERCIAL

## 2018-04-17 ENCOUNTER — APPOINTMENT (OUTPATIENT)
Dept: CARDIOTHORACIC SURGERY | Facility: CLINIC | Age: 68
End: 2018-04-17
Payer: COMMERCIAL

## 2018-04-17 VITALS
WEIGHT: 173 LBS | OXYGEN SATURATION: 98 % | BODY MASS INDEX: 30.65 KG/M2 | SYSTOLIC BLOOD PRESSURE: 146 MMHG | DIASTOLIC BLOOD PRESSURE: 70 MMHG | HEART RATE: 71 BPM | RESPIRATION RATE: 18 BRPM | TEMPERATURE: 97.3 F

## 2018-04-17 DIAGNOSIS — Z09 ENCOUNTER FOR FOLLOW-UP EXAMINATION AFTER COMPLETED TREATMENT FOR CONDITIONS OTHER THAN MALIGNANT NEOPLASM: ICD-10-CM

## 2018-04-17 DIAGNOSIS — Z86.39 PERSONAL HISTORY OF OTHER ENDOCRINE, NUTRITIONAL AND METABOLIC DISEASE: ICD-10-CM

## 2018-04-17 DIAGNOSIS — Z82.49 FAMILY HISTORY OF ISCHEMIC HEART DISEASE AND OTHER DISEASES OF THE CIRCULATORY SYSTEM: ICD-10-CM

## 2018-04-17 DIAGNOSIS — T82.897A OTHER SPECIFIED COMPLICATION OF CARDIAC PROSTHETIC DEVICES, IMPLANTS AND GRAFTS, INITIAL ENCOUNTER: ICD-10-CM

## 2018-04-17 DIAGNOSIS — Z95.5 PRESENCE OF CORONARY ANGIOPLASTY IMPLANT AND GRAFT: ICD-10-CM

## 2018-04-17 DIAGNOSIS — Z98.890 OTHER SPECIFIED POSTPROCEDURAL STATES: Chronic | ICD-10-CM

## 2018-04-17 PROCEDURE — 71046 X-RAY EXAM CHEST 2 VIEWS: CPT | Mod: 26

## 2018-04-17 PROCEDURE — 71046 X-RAY EXAM CHEST 2 VIEWS: CPT

## 2018-04-17 PROCEDURE — 99024 POSTOP FOLLOW-UP VISIT: CPT

## 2018-04-19 DIAGNOSIS — I10 ESSENTIAL (PRIMARY) HYPERTENSION: ICD-10-CM

## 2018-04-19 DIAGNOSIS — E78.5 HYPERLIPIDEMIA, UNSPECIFIED: ICD-10-CM

## 2018-04-19 DIAGNOSIS — Z82.49 FAMILY HISTORY OF ISCHEMIC HEART DISEASE AND OTHER DISEASES OF THE CIRCULATORY SYSTEM: ICD-10-CM

## 2018-04-19 DIAGNOSIS — Z79.82 LONG TERM (CURRENT) USE OF ASPIRIN: ICD-10-CM

## 2018-04-19 DIAGNOSIS — J98.11 ATELECTASIS: ICD-10-CM

## 2018-04-19 DIAGNOSIS — Z95.5 PRESENCE OF CORONARY ANGIOPLASTY IMPLANT AND GRAFT: ICD-10-CM

## 2018-04-19 DIAGNOSIS — R73.03 PREDIABETES: ICD-10-CM

## 2018-04-19 DIAGNOSIS — I25.110 ATHEROSCLEROTIC HEART DISEASE OF NATIVE CORONARY ARTERY WITH UNSTABLE ANGINA PECTORIS: ICD-10-CM

## 2018-04-19 DIAGNOSIS — Z79.02 LONG TERM (CURRENT) USE OF ANTITHROMBOTICS/ANTIPLATELETS: ICD-10-CM

## 2018-04-19 DIAGNOSIS — I25.10 ATHEROSCLEROTIC HEART DISEASE OF NATIVE CORONARY ARTERY WITHOUT ANGINA PECTORIS: ICD-10-CM

## 2018-05-11 VITALS
DIASTOLIC BLOOD PRESSURE: 68 MMHG | RESPIRATION RATE: 16 BRPM | HEART RATE: 77 BPM | WEIGHT: 173.94 LBS | SYSTOLIC BLOOD PRESSURE: 147 MMHG | OXYGEN SATURATION: 100 % | HEIGHT: 63 IN | TEMPERATURE: 98 F

## 2018-05-11 NOTE — H&P ADULT - PSH
H/O shoulder surgery    History of percutaneous coronary intervention H/O shoulder surgery    History of percutaneous coronary intervention    S/P CABG x 1  Mid Cab LIMA-LAD

## 2018-05-11 NOTE — H&P ADULT - HISTORY OF PRESENT ILLNESS
Patient is a 66yo F with strong FHx CAD and PMHx of CAD s/p CHENG LAD and LIMA-LAD, HTN and HLD who originally presented to Dr. Cortes complaining of recurrent exertional chest pain. Patient underwent cardiac cath revealing distalLM 70% (FFR 0.66), ostialLAD 80%, midLAD 50% ISR, ostialLCx 70%, RCA 50%. Patient then underwent MID-CAB with LIMA-LAD on 4/5/18. Since the procedure, patient reports she is having pain under her left breast over incision site, worsened with coughing. Patient reports that since her procedure she has been otherwise feeling well without chest pain or shortness of breath. In light of patient's risk factors, symptoms and known CAD, patient is now referred to [facility] for recommended staged PCI of her residual LCx lesion.    Cardiac Cath 4/3/18 @ [facility]: distalLM 70% (FFR 0.66), ostialLAD 80%, midLAD 50% ISR, ostialLCx 70%, RCA 50%, EDP 10, EF 60%

## 2018-05-11 NOTE — H&P ADULT - ASSESSMENT
66yo F with strong FHx CAD and PMHx of CAD s/p CHENG LAD and LIMA-LAD, HTN and HLD who originally presented to Dr. Cortes complaining of recurrent exertional chest pain. Patient underwent cardiac cath revealing distalLM 70% (FFR 0.66), ostialLAD 80%, midLAD 50% ISR, ostialLCx 70%, RCA 50%. Patient then underwent MID-CAB with LIMA-LAD on 4/5/18. Patient now presents to [facility] for recommended staged PCI of her residual LCx lesion in light of patient's risk factors, symptoms and known CAD.     -Pt compliant with aspirin 81 mg oral daily and plavix 75 mg oral daily. Pt took home doses today. No additional aspirin or plavix given pre cath.  -Pre cath hydration with NS @75 cc/hr.    Risks & benefits of procedure and alternative therapy have been explained to the patient including but not limited to: allergic reaction, bleeding w/possible need for blood transfusion, infection, renal and vascular compromise, limb damage, arrhythmia, stroke, vessel dissection/perforation, Myocardial infarction, emergent CABG. Informed consent obtained and in chart.

## 2018-05-15 ENCOUNTER — INPATIENT (INPATIENT)
Facility: HOSPITAL | Age: 68
LOS: 0 days | Discharge: HOME | DRG: 247 | End: 2018-05-16
Attending: INTERNAL MEDICINE | Admitting: INTERNAL MEDICINE
Payer: COMMERCIAL

## 2018-05-15 DIAGNOSIS — Z98.890 OTHER SPECIFIED POSTPROCEDURAL STATES: Chronic | ICD-10-CM

## 2018-05-15 DIAGNOSIS — Z95.1 PRESENCE OF AORTOCORONARY BYPASS GRAFT: Chronic | ICD-10-CM

## 2018-05-15 LAB
ALBUMIN SERPL ELPH-MCNC: 4.3 G/DL — SIGNIFICANT CHANGE UP (ref 3.3–5)
ALP SERPL-CCNC: 76 U/L — SIGNIFICANT CHANGE UP (ref 40–120)
ALT FLD-CCNC: 12 U/L — SIGNIFICANT CHANGE UP (ref 10–45)
ANION GAP SERPL CALC-SCNC: 12 MMOL/L — SIGNIFICANT CHANGE UP (ref 5–17)
APTT BLD: 30.1 SEC — SIGNIFICANT CHANGE UP (ref 27.5–37.4)
AST SERPL-CCNC: 22 U/L — SIGNIFICANT CHANGE UP (ref 10–40)
BASOPHILS NFR BLD AUTO: 0.4 % — SIGNIFICANT CHANGE UP (ref 0–2)
BILIRUB SERPL-MCNC: 0.2 MG/DL — SIGNIFICANT CHANGE UP (ref 0.2–1.2)
BUN SERPL-MCNC: 11 MG/DL — SIGNIFICANT CHANGE UP (ref 7–23)
CALCIUM SERPL-MCNC: 9.7 MG/DL — SIGNIFICANT CHANGE UP (ref 8.4–10.5)
CHLORIDE SERPL-SCNC: 102 MMOL/L — SIGNIFICANT CHANGE UP (ref 96–108)
CHOLEST SERPL-MCNC: 168 MG/DL — SIGNIFICANT CHANGE UP (ref 10–199)
CK MB CFR SERPL CALC: 1.8 NG/ML — SIGNIFICANT CHANGE UP (ref 0–6.7)
CK SERPL-CCNC: 123 U/L — SIGNIFICANT CHANGE UP (ref 25–170)
CO2 SERPL-SCNC: 28 MMOL/L — SIGNIFICANT CHANGE UP (ref 22–31)
CREAT SERPL-MCNC: 0.72 MG/DL — SIGNIFICANT CHANGE UP (ref 0.5–1.3)
EOSINOPHIL NFR BLD AUTO: 7.3 % — HIGH (ref 0–6)
GLUCOSE SERPL-MCNC: 94 MG/DL — SIGNIFICANT CHANGE UP (ref 70–99)
HBA1C BLD-MCNC: 6.2 % — HIGH (ref 4–5.6)
HCT VFR BLD CALC: 35.8 % — SIGNIFICANT CHANGE UP (ref 34.5–45)
HDLC SERPL-MCNC: 47 MG/DL — SIGNIFICANT CHANGE UP (ref 40–125)
HGB BLD-MCNC: 11.2 G/DL — LOW (ref 11.5–15.5)
INR BLD: 1.22 — HIGH (ref 0.88–1.16)
LIPID PNL WITH DIRECT LDL SERPL: 89 MG/DL — SIGNIFICANT CHANGE UP
LYMPHOCYTES # BLD AUTO: 39.6 % — SIGNIFICANT CHANGE UP (ref 13–44)
MCHC RBC-ENTMCNC: 25.6 PG — LOW (ref 27–34)
MCHC RBC-ENTMCNC: 31.3 G/DL — LOW (ref 32–36)
MCV RBC AUTO: 81.7 FL — SIGNIFICANT CHANGE UP (ref 80–100)
MONOCYTES NFR BLD AUTO: 8 % — SIGNIFICANT CHANGE UP (ref 2–14)
NEUTROPHILS NFR BLD AUTO: 44.7 % — SIGNIFICANT CHANGE UP (ref 43–77)
PLATELET # BLD AUTO: 343 K/UL — SIGNIFICANT CHANGE UP (ref 150–400)
POTASSIUM SERPL-MCNC: 4.2 MMOL/L — SIGNIFICANT CHANGE UP (ref 3.5–5.3)
POTASSIUM SERPL-SCNC: 4.2 MMOL/L — SIGNIFICANT CHANGE UP (ref 3.5–5.3)
PROT SERPL-MCNC: 8.1 G/DL — SIGNIFICANT CHANGE UP (ref 6–8.3)
PROTHROM AB SERPL-ACNC: 13.6 SEC — HIGH (ref 9.8–12.7)
RBC # BLD: 4.38 M/UL — SIGNIFICANT CHANGE UP (ref 3.8–5.2)
RBC # FLD: 13.4 % — SIGNIFICANT CHANGE UP (ref 10.3–16.9)
SODIUM SERPL-SCNC: 142 MMOL/L — SIGNIFICANT CHANGE UP (ref 135–145)
TOTAL CHOLESTEROL/HDL RATIO MEASUREMENT: 3.6 RATIO — SIGNIFICANT CHANGE UP (ref 3.3–7.1)
TRIGL SERPL-MCNC: 160 MG/DL — HIGH (ref 10–149)
TROPONIN T SERPL-MCNC: <0.01 NG/ML — SIGNIFICANT CHANGE UP (ref 0–0.01)
WBC # BLD: 5.4 K/UL — SIGNIFICANT CHANGE UP (ref 3.8–10.5)
WBC # FLD AUTO: 5.4 K/UL — SIGNIFICANT CHANGE UP (ref 3.8–10.5)

## 2018-05-15 PROCEDURE — 92928 PRQ TCAT PLMT NTRAC ST 1 LES: CPT | Mod: LM

## 2018-05-15 PROCEDURE — 93010 ELECTROCARDIOGRAM REPORT: CPT

## 2018-05-15 PROCEDURE — 93454 CORONARY ARTERY ANGIO S&I: CPT | Mod: 26,XU

## 2018-05-15 PROCEDURE — 93010 ELECTROCARDIOGRAM REPORT: CPT | Mod: 77

## 2018-05-15 PROCEDURE — 92978 ENDOLUMINL IVUS OCT C 1ST: CPT | Mod: 26,LM

## 2018-05-15 PROCEDURE — 70450 CT HEAD/BRAIN W/O DYE: CPT | Mod: 26

## 2018-05-15 RX ORDER — ASPIRIN/CALCIUM CARB/MAGNESIUM 324 MG
81 TABLET ORAL DAILY
Qty: 0 | Refills: 0 | Status: DISCONTINUED | OUTPATIENT
Start: 2018-05-15 | End: 2018-05-16

## 2018-05-15 RX ORDER — METOPROLOL TARTRATE 50 MG
75 TABLET ORAL
Qty: 0 | Refills: 0 | Status: DISCONTINUED | OUTPATIENT
Start: 2018-05-15 | End: 2018-05-16

## 2018-05-15 RX ORDER — CHLORHEXIDINE GLUCONATE 213 G/1000ML
1 SOLUTION TOPICAL ONCE
Qty: 0 | Refills: 0 | Status: DISCONTINUED | OUTPATIENT
Start: 2018-05-15 | End: 2018-05-15

## 2018-05-15 RX ORDER — LOSARTAN POTASSIUM 100 MG/1
50 TABLET, FILM COATED ORAL DAILY
Qty: 0 | Refills: 0 | Status: DISCONTINUED | OUTPATIENT
Start: 2018-05-15 | End: 2018-05-16

## 2018-05-15 RX ORDER — ATORVASTATIN CALCIUM 80 MG/1
40 TABLET, FILM COATED ORAL AT BEDTIME
Qty: 0 | Refills: 0 | Status: DISCONTINUED | OUTPATIENT
Start: 2018-05-15 | End: 2018-05-16

## 2018-05-15 RX ORDER — TICAGRELOR 90 MG/1
90 TABLET ORAL
Qty: 0 | Refills: 0 | Status: DISCONTINUED | OUTPATIENT
Start: 2018-05-16 | End: 2018-05-16

## 2018-05-15 RX ORDER — ONDANSETRON 8 MG/1
4 TABLET, FILM COATED ORAL ONCE
Qty: 0 | Refills: 0 | Status: COMPLETED | OUTPATIENT
Start: 2018-05-15 | End: 2018-05-15

## 2018-05-15 RX ORDER — AMLODIPINE BESYLATE 2.5 MG/1
5 TABLET ORAL DAILY
Qty: 0 | Refills: 0 | Status: DISCONTINUED | OUTPATIENT
Start: 2018-05-15 | End: 2018-05-16

## 2018-05-15 RX ORDER — SODIUM CHLORIDE 9 MG/ML
500 INJECTION INTRAMUSCULAR; INTRAVENOUS; SUBCUTANEOUS
Qty: 0 | Refills: 0 | Status: COMPLETED | OUTPATIENT
Start: 2018-05-15 | End: 2018-05-15

## 2018-05-15 RX ORDER — ACETAMINOPHEN 500 MG
1000 TABLET ORAL ONCE
Qty: 0 | Refills: 0 | Status: DISCONTINUED | OUTPATIENT
Start: 2018-05-15 | End: 2018-05-16

## 2018-05-15 RX ORDER — SODIUM CHLORIDE 9 MG/ML
500 INJECTION INTRAMUSCULAR; INTRAVENOUS; SUBCUTANEOUS
Qty: 0 | Refills: 0 | Status: DISCONTINUED | OUTPATIENT
Start: 2018-05-15 | End: 2018-05-15

## 2018-05-15 RX ADMIN — ATORVASTATIN CALCIUM 40 MILLIGRAM(S): 80 TABLET, FILM COATED ORAL at 23:06

## 2018-05-15 RX ADMIN — SODIUM CHLORIDE 75 MILLILITER(S): 9 INJECTION INTRAMUSCULAR; INTRAVENOUS; SUBCUTANEOUS at 19:58

## 2018-05-15 RX ADMIN — SODIUM CHLORIDE 75 MILLILITER(S): 9 INJECTION INTRAMUSCULAR; INTRAVENOUS; SUBCUTANEOUS at 12:51

## 2018-05-15 RX ADMIN — ONDANSETRON 4 MILLIGRAM(S): 8 TABLET, FILM COATED ORAL at 20:46

## 2018-05-15 NOTE — PROVIDER CONTACT NOTE (OTHER) - ASSESSMENT
Pt. alert and oriented, c/o headache, had an episode of vomiting. Right groin remains c/d/i, safegaurd in place. VSS.

## 2018-05-15 NOTE — CHART NOTE - NSCHARTNOTEFT_GEN_A_CORE
Interventional Cardiology PA note    Pt was seen in cath lab holding area after staged PCI of LM/oLCx. Pt was noted to have oozing at R groin Perclose access site. Bandaged was removed and PA held pressure for 10 minutes without resolution. A total of 60cc of Lido/Epi was administered with complete resolution of bleeding. No hematoma, no bruit, 2 + R femoral pulse and distal pulses intact to baseline. VSS throughout event and pt remained asymptomatic as well. Bandaged replaced. Pt awaiting transfer to [facility].

## 2018-05-15 NOTE — CHART NOTE - NSCHARTNOTEFT_GEN_A_CORE
PA called to cath lab holding area as R groin site was noted to be oozing after pt moved to use bedpan. Nurses had been applying manually pressure on R groin access site when pt began c/o 5/10 chest pressure and became cool, clammy, diaphoretic, and nauseated. SBP in 100's and HR in 60s-70s. Manual compression was applied by PA for another 10 minutes, bleeding resolved and bandaged was replaced. BP improved to 140s. Pt noted improvement of nausea and diaphoresis, but pt still noted chest pressure sensation. EKG revealed new TWI in aVL and V2. Dr. Cortes and Interventional Cardio fellow made aware. PA called to cath lab holding area as R groin site was noted to be oozing after pt moved to use bedpan. Nurses had been applying manually pressure on R groin access site when pt began c/o 5/10 chest pressure, HA and became cool, clammy, diaphoretic, and nauseated. SBP in 100's and HR in 60s-70s. Manual compression was applied by PA for another 10 minutes, bleeding resolved and bandaged was replaced. BP improved to 140s. Pt noted improvement of nausea and diaphoresis, but pt still noted chest pressure sensation and HA. EKG revealed new TWI in aVL and V2. Dr. Cortes recommended placing safeguard on R groin access site. Safeguard placed with resolution of bleeding. Pt given Tylenol 1000mg IV x 1 for HA. Will continue to reassess pt and Dr. Cortes states he will reassess pt shortly as well. PA called to cath lab holding area as R groin site was noted to be oozing after pt moved to use bedpan. Nurses had been applying manually pressure on R groin access site when pt began c/o 5/10 chest pressure, HA and became cool, clammy, diaphoretic, and nauseated. SBP in 100's and HR in 60s-70s. Manual compression was applied by PA for another 10 minutes, bleeding resolved and bandaged was replaced. BP improved to 140s. Pt noted improvement of nausea and diaphoresis, but pt still noted chest pressure sensation and HA. EKG revealed new TWI in aVL and V2. Repeat EKg showed flattening of T wave in aVL, TWI in V2. Dr. Cortes recommended placing safeguard on R groin access site. Safeguard placed with resolution of bleeding. Pt given Tylenol 1000mg IV x 1 for HA. Will continue to reassess pt and Dr. Cortes states he will reassess pt as well. PA called to cath lab holding area as R groin site was noted to be oozing after pt moved to use bedpan. Nurses had been applying manually pressure on R groin access site when pt began c/o 5/10 chest pressure, HA and became cool, clammy, diaphoretic, and nauseated. SBP in 100's and HR in 60s-70s. Manual compression was applied by PA for another 10 minutes, bleeding resolved and bandaged was replaced. BP improved to 140s. Pt noted improvement of nausea and diaphoresis, but pt still noted chest pressure sensation and HA. EKG revealed new TWI in aVL and V2. Repeat EKg showed flattening of T wave in aVL, TWI in V2. Dr. Cortes recommended placing safeguard on R groin access site. Safeguard placed with resolution of bleeding. Pt given Tylenol 1000mg IV x 1 for HA. Pt now notes improvement of CP and GARCIA. Dr. Cortes aware.

## 2018-05-15 NOTE — PROVIDER CONTACT NOTE (OTHER) - ACTION/TREATMENT ORDERED:
JONNA Schmitt at bedside to assess pt. Zofran IVP administered as ordered. CT Head ordered. Pt. states headache resolving after vomiting. Will continue to monitor.

## 2018-05-16 ENCOUNTER — TRANSCRIPTION ENCOUNTER (OUTPATIENT)
Age: 68
End: 2018-05-16

## 2018-05-16 VITALS — TEMPERATURE: 97 F

## 2018-05-16 LAB
ANION GAP SERPL CALC-SCNC: 13 MMOL/L — SIGNIFICANT CHANGE UP (ref 5–17)
BUN SERPL-MCNC: 15 MG/DL — SIGNIFICANT CHANGE UP (ref 7–23)
CALCIUM SERPL-MCNC: 9.3 MG/DL — SIGNIFICANT CHANGE UP (ref 8.4–10.5)
CHLORIDE SERPL-SCNC: 102 MMOL/L — SIGNIFICANT CHANGE UP (ref 96–108)
CO2 SERPL-SCNC: 25 MMOL/L — SIGNIFICANT CHANGE UP (ref 22–31)
CREAT SERPL-MCNC: 0.83 MG/DL — SIGNIFICANT CHANGE UP (ref 0.5–1.3)
GLUCOSE SERPL-MCNC: 154 MG/DL — HIGH (ref 70–99)
HCT VFR BLD CALC: 33.4 % — LOW (ref 34.5–45)
HGB BLD-MCNC: 10.8 G/DL — LOW (ref 11.5–15.5)
MAGNESIUM SERPL-MCNC: 2 MG/DL — SIGNIFICANT CHANGE UP (ref 1.6–2.6)
MCHC RBC-ENTMCNC: 25.6 PG — LOW (ref 27–34)
MCHC RBC-ENTMCNC: 32.3 G/DL — SIGNIFICANT CHANGE UP (ref 32–36)
MCV RBC AUTO: 79.1 FL — LOW (ref 80–100)
PLATELET # BLD AUTO: 318 K/UL — SIGNIFICANT CHANGE UP (ref 150–400)
POTASSIUM SERPL-MCNC: 3.6 MMOL/L — SIGNIFICANT CHANGE UP (ref 3.5–5.3)
POTASSIUM SERPL-SCNC: 3.6 MMOL/L — SIGNIFICANT CHANGE UP (ref 3.5–5.3)
RBC # BLD: 4.22 M/UL — SIGNIFICANT CHANGE UP (ref 3.8–5.2)
RBC # FLD: 13.6 % — SIGNIFICANT CHANGE UP (ref 10.3–16.9)
SODIUM SERPL-SCNC: 140 MMOL/L — SIGNIFICANT CHANGE UP (ref 135–145)
WBC # BLD: 5.5 K/UL — SIGNIFICANT CHANGE UP (ref 3.8–10.5)
WBC # FLD AUTO: 5.5 K/UL — SIGNIFICANT CHANGE UP (ref 3.8–10.5)

## 2018-05-16 PROCEDURE — 80048 BASIC METABOLIC PNL TOTAL CA: CPT

## 2018-05-16 PROCEDURE — C1760: CPT

## 2018-05-16 PROCEDURE — 83735 ASSAY OF MAGNESIUM: CPT

## 2018-05-16 PROCEDURE — 36415 COLL VENOUS BLD VENIPUNCTURE: CPT

## 2018-05-16 PROCEDURE — 80061 LIPID PANEL: CPT

## 2018-05-16 PROCEDURE — 93010 ELECTROCARDIOGRAM REPORT: CPT

## 2018-05-16 PROCEDURE — 83036 HEMOGLOBIN GLYCOSYLATED A1C: CPT

## 2018-05-16 PROCEDURE — C1753: CPT

## 2018-05-16 PROCEDURE — 85027 COMPLETE CBC AUTOMATED: CPT

## 2018-05-16 PROCEDURE — 93005 ELECTROCARDIOGRAM TRACING: CPT

## 2018-05-16 PROCEDURE — 70450 CT HEAD/BRAIN W/O DYE: CPT

## 2018-05-16 PROCEDURE — 85025 COMPLETE CBC W/AUTO DIFF WBC: CPT

## 2018-05-16 PROCEDURE — 99235 HOSP IP/OBS SAME DATE MOD 70: CPT

## 2018-05-16 PROCEDURE — C1889: CPT

## 2018-05-16 PROCEDURE — 84484 ASSAY OF TROPONIN QUANT: CPT

## 2018-05-16 PROCEDURE — 85730 THROMBOPLASTIN TIME PARTIAL: CPT

## 2018-05-16 PROCEDURE — C1894: CPT

## 2018-05-16 PROCEDURE — C1887: CPT

## 2018-05-16 PROCEDURE — 82553 CREATINE MB FRACTION: CPT

## 2018-05-16 PROCEDURE — 80053 COMPREHEN METABOLIC PANEL: CPT

## 2018-05-16 PROCEDURE — C1725: CPT

## 2018-05-16 PROCEDURE — C1769: CPT

## 2018-05-16 PROCEDURE — 82550 ASSAY OF CK (CPK): CPT

## 2018-05-16 PROCEDURE — C1874: CPT

## 2018-05-16 PROCEDURE — 85610 PROTHROMBIN TIME: CPT

## 2018-05-16 RX ORDER — ASPIRIN/CALCIUM CARB/MAGNESIUM 324 MG
1 TABLET ORAL
Qty: 30 | Refills: 11 | OUTPATIENT
Start: 2018-05-16 | End: 2019-05-10

## 2018-05-16 RX ORDER — TICAGRELOR 90 MG/1
1 TABLET ORAL
Qty: 60 | Refills: 0 | OUTPATIENT
Start: 2018-05-16 | End: 2018-06-14

## 2018-05-16 RX ORDER — TICAGRELOR 90 MG/1
1 TABLET ORAL
Qty: 60 | Refills: 11 | OUTPATIENT
Start: 2018-05-16 | End: 2019-05-10

## 2018-05-16 RX ORDER — ATORVASTATIN CALCIUM 80 MG/1
1 TABLET, FILM COATED ORAL
Qty: 30 | Refills: 0 | OUTPATIENT
Start: 2018-05-16 | End: 2018-06-14

## 2018-05-16 RX ADMIN — TICAGRELOR 90 MILLIGRAM(S): 90 TABLET ORAL at 05:44

## 2018-05-16 RX ADMIN — AMLODIPINE BESYLATE 5 MILLIGRAM(S): 2.5 TABLET ORAL at 05:44

## 2018-05-16 RX ADMIN — LOSARTAN POTASSIUM 50 MILLIGRAM(S): 100 TABLET, FILM COATED ORAL at 05:44

## 2018-05-16 RX ADMIN — Medication 75 MILLIGRAM(S): at 05:44

## 2018-05-16 RX ADMIN — Medication 81 MILLIGRAM(S): at 11:37

## 2018-05-16 NOTE — DISCHARGE NOTE ADULT - MEDICATION SUMMARY - MEDICATIONS TO TAKE
I will START or STAY ON the medications listed below when I get home from the hospital:    Ecotrin Adult Low Strength 81 mg oral delayed release tablet  -- 1 tab(s) by mouth once a day  -- Indication: For Coronary Artery Disease and Stent    Cozaar 50 mg oral tablet  -- 1 tab(s) by mouth once a day  -- Indication: For Hypertension    atorvastatin 20 mg oral tablet  -- 1 tab(s) by mouth once a day (at bedtime)  -- Indication: For Cholesterol / Coronary Artery Disease    ticagrelor 90 mg oral tablet  -- 1 tab(s) by mouth 2 times a day  -- Indication: For Coronary Artery Disease and Stent    metoprolol tartrate 75 mg oral tablet  -- 1 tab(s) by mouth every 12 hours   -- It is very important that you take or use this exactly as directed.  Do not skip doses or discontinue unless directed by your doctor.  May cause drowsiness.  Alcohol may intensify this effect.  Use care when operating dangerous machinery.  Some non-prescription drugs may aggravate your condition.  Read all labels carefully.  If a warning appears, check with your doctor before taking.  Take with food or milk.  This drug may impair the ability to drive or operate machinery.  Use care until you become familiar with its effects.    -- Indication: For Coronary Artery Disease / Hypertension    amLODIPine 5 mg oral tablet  -- 1 tab(s) by mouth once a day  -- Indication: For Hypertension I will START or STAY ON the medications listed below when I get home from the hospital:    Ecotrin Adult Low Strength 81 mg oral delayed release tablet  -- 1 tab(s) by mouth once a day  -- Indication: For Coronary Artery Disease and Stent    Cozaar 50 mg oral tablet  -- 1 tab(s) by mouth once a day  -- Indication: For Hypertension    atorvastatin 20 mg oral tablet  -- 1 tab(s) by mouth once a day (at bedtime)  -- Indication: For Cholesterol / Coronary Artery Disease    ticagrelor 90 mg oral tablet  -- 1 tab(s) by mouth 2 times a day  -- Indication: For Coronary Artery Disease and Stent    Brilinta (ticagrelor) 90 mg oral tablet  -- 1 tab(s) by mouth 2 times a day   -- It is very important that you take or use this exactly as directed.  Do not skip doses or discontinue unless directed by your doctor.  Obtain medical advice before taking any non-prescription drugs as some may affect the action of this medication.    -- Indication: For Coronary Artery Disease and Stent (Free Month Supply)    metoprolol tartrate 75 mg oral tablet  -- 1 tab(s) by mouth every 12 hours   -- It is very important that you take or use this exactly as directed.  Do not skip doses or discontinue unless directed by your doctor.  May cause drowsiness.  Alcohol may intensify this effect.  Use care when operating dangerous machinery.  Some non-prescription drugs may aggravate your condition.  Read all labels carefully.  If a warning appears, check with your doctor before taking.  Take with food or milk.  This drug may impair the ability to drive or operate machinery.  Use care until you become familiar with its effects.    -- Indication: For Coronary Artery Disease / Hypertension    amLODIPine 5 mg oral tablet  -- 1 tab(s) by mouth once a day  -- Indication: For Hypertension I will START or STAY ON the medications listed below when I get home from the hospital:    Ecotrin Adult Low Strength 81 mg oral delayed release tablet  -- 1 tab(s) by mouth once a day  -- Indication: For Coronary Artery Disease and Stent    Cozaar 50 mg oral tablet  -- 1 tab(s) by mouth once a day  -- Indication: For Hypertension    Lipitor 40 mg oral tablet  -- 1 tab(s) by mouth once a day (at bedtime)  -- Indication: For Cholesterol/Coronary Artery Disease    ticagrelor 90 mg oral tablet  -- 1 tab(s) by mouth 2 times a day  -- Indication: For Coronary Artery Disease and Stent    Brilinta (ticagrelor) 90 mg oral tablet  -- 1 tab(s) by mouth 2 times a day   -- It is very important that you take or use this exactly as directed.  Do not skip doses or discontinue unless directed by your doctor.  Obtain medical advice before taking any non-prescription drugs as some may affect the action of this medication.    -- Indication: For Coronary Artery Disease and Stent (Free Month Supply)    metoprolol tartrate 75 mg oral tablet  -- 1 tab(s) by mouth every 12 hours   -- It is very important that you take or use this exactly as directed.  Do not skip doses or discontinue unless directed by your doctor.  May cause drowsiness.  Alcohol may intensify this effect.  Use care when operating dangerous machinery.  Some non-prescription drugs may aggravate your condition.  Read all labels carefully.  If a warning appears, check with your doctor before taking.  Take with food or milk.  This drug may impair the ability to drive or operate machinery.  Use care until you become familiar with its effects.    -- Indication: For Coronary Artery Disease / Hypertension    amLODIPine 5 mg oral tablet  -- 1 tab(s) by mouth once a day  -- Indication: For Hypertension

## 2018-05-16 NOTE — CONSULT NOTE ADULT - SUBJECTIVE AND OBJECTIVE BOX
CHIEF COMPLAINT: CAD    HISTORY OF PRESENT ILLNESS:  66yo F with strong FHx CAD and PMHx of CAD s/p CHENG LAD and LIMA-LAD, HTN and HLD who originally presented to Dr. Cortes complaining of recurrent exertional chest pain. Patient underwent cardiac cath revealing distalLM 70% (FFR 0.66), ostialLAD 80%, midLAD 50% ISR, ostialLCx 70%, RCA 50%. Patient then underwent MID-CAB with LIMA-LAD on 4/5/18. Patient now presents to [facility] for recommended staged PCI of her residual LCx lesion in light of patient's risk factors, symptoms and known CAD. Patient was seen at the bedside prior to discharge to discuss secondary prevention.     PAST MEDICAL & SURGICAL HISTORY:  Coronary artery disease  Hyperlipidemia  Hypertension  S/P CABG x 1: Mid Cab LIMA-LAD  H/O shoulder surgery  History of percutaneous coronary intervention    FAMILY HISTORY:   + family history of CAD in her mother, maternal grandmother, and maternal uncles as well as her siblings.     Allergies:   No Known Allergies    HOME MEDICATIONS:  · 	Cozaar 50 mg oral tablet: 1 tab(s) orally once a day, Last Dose Taken:    · 	amLODIPine 5 mg oral tablet: 1 tab(s) orally once a day, Last Dose Taken:    · 	metoprolol tartrate 75 mg oral tablet: 1 tab(s) orally every 12 hours , Last Dose Taken:    · 	Plavix 75 mg oral tablet: 1 tab(s) orally once a day, Last Dose Taken:    · 	Ecotrin Adult Low Strength 81 mg oral delayed release tablet: 1 tab(s) orally once a day, Last Dose Taken:    · 	atorvastatin 20 mg oral tablet: 1 tab(s) orally once a day (at bedtime), Last Dose Taken:      PHYSICAL EXAM:  T(C): 36.2 (05-16-18 @ 13:45), Max: 36.6 (05-16-18 @ 09:23)  T(F): 97.1 (05-16-18 @ 13:45), Max: 97.8 (05-16-18 @ 09:23)  HR: 69 (05-16-18 @ 11:35) (69 - 89)  BP: 112/56 (05-16-18 @ 11:35) (112/56 - 164/73)  RR: 18 (05-16-18 @ 11:35) (16 - 18)  Height (cm): 160.02 (05-15 @ 12:42)  Weight (kg): 78.9 (05-15 @ 12:42)  BMI (kg/m2): 30.8 (05-15 @ 12:42)  	  LABS:	                 10.8   5.5   )-----------( 318      ( 16 May 2018 07:13 )             33.4     05-16    140  |  102  |  15  ----------------------------<  154<H>  3.6   |  25  |  0.83    Ca    9.3      16 May 2018 07:13  Mg     2.0     05-16    TPro  8.1  /  Alb  4.3  /  TBili  0.2  /  DBili  x   /  AST  22  /  ALT  12  /  AlkPhos  76  05-15    Cholesterol, Serum: 168 mg/dL (05-15 @ 11:25)  HDL Cholesterol, Serum: 47 mg/dL (05-15 @ 11:25)  Triglycerides, Serum: 160 mg/dL (05-15 @ 11:25)  Direct LDL: 89 mg/dL (05-15 @ 11:25)    Hemoglobin A1C, Whole Blood: 6.2 % (05-15-18 @ 11:25)      10 "S" ASSESSMENT PLAN: SMOKING, SITTING, SUGAR, SALT, SOME FATS, SOCIAL, SLEEP, SIGNS, AND MEDS:  Tobacco usage: Denies.   ETOH: Denies.   Stress: States that she has no stress.   Caffeine: One or two coffees per week with skim milk.   Hormone Replacement: Denies.   Sleep Disorder: She snores occasionally and doesn't always feel rested after sleep.   Inflammatory Condition: Denies.   Activity Level: Sedentary.   Heart Failure: Denies history of CHF or current symptoms suggestive of congestion.   Myopathy with Statins: Denies.   GI/ Issues: Denies.   GYN: Denies history of pre-term delivery or gestational hypertension or diabetes.   Migraines: Denies.   Current Diet: Breakfast) tea or coffee, wheat crackers or bread without butter and salt fish or dumplings with either oatmeal, corn meal porridge or cream of wheat. Lunch) white rice, chicken, and vegetables. Dinner) potatoes, pumpkin, red plantains, green bananas, brown rice, and veggies with chicken or salmon. Snacks) fruit or crackers.     ASSESSMENT/RECOMMENDATIONS: 	  Summary: 66yo F with strong FHx CAD and PMHx of CAD s/p CHENG LAD and LIMA-LAD, HTN and HLD who originally presented to Dr. Cortes complaining of recurrent exertional chest pain. Patient underwent cardiac cath revealing distalLM 70% (FFR 0.66), ostialLAD 80%, midLAD 50% ISR, ostialLCx 70%, RCA 50%. Patient then underwent MID-CAB with LIMA-LAD on 4/5/18. Patient now presents to [facility] for recommended staged PCI of her residual LCx lesion in light of patient's risk factors, symptoms and known CAD. Patient was seen at the bedside prior to discharge to discuss secondary prevention.       RECOMMENDATIONS:   Anti-platelet Therapy: DAPT per interventionalist recommendation.   Lipid Therapy: High dose statin therapy would likely benefit this patient. She was discharged on Lipitor 40 mg/d. Her LDL is currently at 89 mg/dl. A goal of less than 70 mg/dl is suggested for this patient.   Beta Blocker Therapy: Continue current therapy with metoprolol per your discretion.   ACE/ARB Therapy: Continue current therapy with Cozaar per your discretion.   Diet: Low-sodium, low-carbohydrate, Mediterranean diet. Written instruction on the Mediterranean diet was provided. Her A1C is currently elevated at 6.2. The addition of metformin 500 mg/d might benefit this patient and help prevent the progression to diabetes in conjunction with lifestyle modification.   Exercise: 30-45 minutes most days of the week once cleared to do so by their referring cardiologist. Cardiac rehab might benefit this patient.   Smoking: This patient is a non-smoker.   Stress Management: Instruction on mindfulness meditation was provided.   Sleep: This patient might benefit from a sleep study to rule out sleep apnea.     Thank you for the opportunity to see this patient. Please feel free to contact [facility] if there are any questions, or if you feel that your patient would benefit from continued follow-up visits with the Program.

## 2018-05-16 NOTE — DISCHARGE NOTE ADULT - CARE PROVIDER_API CALL
Monty Cortes (KATRINA), Cardiovascular Disease; Internal Medicine  [address] [place] [address]  Phone: [phone]  Fax: [phone]

## 2018-05-16 NOTE — DISCHARGE NOTE ADULT - CARE PLAN
Principal Discharge DX:	Coronary artery disease  Goal:	MD follow-up, Medication compliance  Assessment and plan of treatment:	CONTINUE ASPIRIN AND BRILINTA (TICAGRELOR) TWICE DAILY. Continue current listed medical regimen. See Dr. Cortes in 1 week. See Primary Doctor in 1 week. STOP PLAVIX  Monitor right groin/leg for swelling, bleeding, discharge, pain or skin discoloration if any of these findings are noted go to nearest ER, seek medical attention immediately and call [phone] if any questions. If chest pain, shortness of breath, dizziness noted call MD immediately and seek medical attention.  Avoid hot tubs, swimming pools and baths for 1 week. Avoid lifting more than 3 pounds for one week, avoid exertional movements such intimacy, running, jumping, etc for 1 week.  A Free Month Supply of Brilinta is available for  at [facility] [address], [place] [address]. The remainder of Brilinta supply was sent to your pharmacy  Secondary Diagnosis:	Hypertension  Assessment and plan of treatment:	Continue listed medical regimen. MD follow-up  Secondary Diagnosis:	Hyperlipidemia  Assessment and plan of treatment:	Continue listed medical regimen. MD follow-up  Secondary Diagnosis:	Headache  Assessment and plan of treatment:	If further headache noted and not resolving after Tylenol use over the counter as directed, notify MD for further evaluation Principal Discharge DX:	Coronary artery disease  Goal:	MD follow-up, Medication compliance  Assessment and plan of treatment:	CONTINUE ASPIRIN AND BRILINTA (TICAGRELOR) TWICE DAILY. Continue current listed medical regimen. See Dr. Cortes in 1 week. See Primary Doctor in 1 week. STOP PLAVIX  Monitor right groin/leg for swelling, bleeding, discharge, pain or skin discoloration if any of these findings are noted go to nearest ER, seek medical attention immediately and call [phone] if any questions. If chest pain, shortness of breath, dizziness noted call MD immediately and seek medical attention.  Avoid hot tubs, swimming pools and baths for 1 week. Avoid lifting more than 3 pounds for one week, avoid exertional movements such intimacy, running, jumping, etc for 1 week.  A Free Month Supply of Brilinta is available for  at [facility] [address], [place] [address]. The remainder of Brilinta supply was sent to your pharmacy  Secondary Diagnosis:	Hypertension  Assessment and plan of treatment:	Continue listed medical regimen. MD follow-up  Secondary Diagnosis:	Hyperlipidemia  Assessment and plan of treatment:	Lipitor increased to 40mg daily per Dr. Cortes. MD follow-up  Secondary Diagnosis:	Headache  Assessment and plan of treatment:	If further headache noted and not resolving after Tylenol use over the counter as directed, notify MD for further evaluation Principal Discharge DX:	Coronary artery disease  Goal:	MD follow-up, Medication compliance  Assessment and plan of treatment:	CONTINUE ASPIRIN AND BRILINTA (TICAGRELOR) TWICE DAILY. Continue current listed medical regimen. See Dr. Cortes in 1 week. See Primary Doctor in 1 week. STOP PLAVIX  Monitor right groin/leg for swelling, bleeding, discharge, pain or skin discoloration if any of these findings are noted go to nearest ER, seek medical attention immediately and call [phone] if any questions. If chest pain, shortness of breath, dizziness noted call MD immediately and seek medical attention.  Avoid hot tubs, swimming pools and baths for 1 week. Avoid lifting more than 3 pounds for one week, avoid exertional movements such intimacy, running, jumping, etc for 1 week.  A Free Month Supply of Brilinta is available for  at [facility] [address], [place] [address]. The remainder of Brilinta supply was sent to your pharmacy  Secondary Diagnosis:	Hypertension  Assessment and plan of treatment:	Continue listed medical regimen. MD follow-up  Secondary Diagnosis:	Hyperlipidemia  Assessment and plan of treatment:	Lipitor increased to 40mg daily per Dr. Cortes. MD follow-up  Secondary Diagnosis:	Headache  Assessment and plan of treatment:	If further headache noted and not resolving after Tylenol use over the counter as directed, notify MD for further evaluation  Secondary Diagnosis:	Impaired glucose tolerance  Assessment and plan of treatment:	You are pre-diabetic. Avoid concentrated sweets. MD follow-up

## 2018-05-16 NOTE — DISCHARGE NOTE ADULT - PLAN OF CARE
MD follow-up, Medication compliance CONTINUE ASPIRIN AND BRILINTA (TICAGRELOR) TWICE DAILY. Continue current listed medical regimen. See Dr. Cortes in 1 week. See Primary Doctor in 1 week. STOP PLAVIX  Monitor right groin/leg for swelling, bleeding, discharge, pain or skin discoloration if any of these findings are noted go to nearest ER, seek medical attention immediately and call [phone] if any questions. If chest pain, shortness of breath, dizziness noted call MD immediately and seek medical attention.  Avoid hot tubs, swimming pools and baths for 1 week. Avoid lifting more than 3 pounds for one week, avoid exertional movements such intimacy, running, jumping, etc for 1 week.  A Free Month Supply of Brilinta is available for  at [facility] [address] [place] [address]. The remainder of Brilinta supply was sent to your pharmacy Continue listed medical regimen. MD follow-up If further headache noted and not resolving after Tylenol use over the counter as directed, notify MD for further evaluation Lipitor increased to 40mg daily per Dr. Cortes. MD follow-up You are pre-diabetic. Avoid concentrated sweets. MD follow-up

## 2018-05-16 NOTE — DISCHARGE NOTE ADULT - HOSPITAL COURSE
66 y/o F w/ PMHX HTN, HLD, CAD s/p prior PCI, s/p mid CABG LIMA-LAD, who presented for staged PCI.    Pt s/p cardiac angiogram on 5/15/18: IVUS/FFR/CHENG LM, LIMA-LAD patent  This AM VSS, Pt Asymptomatic, R Groin Stable, Labs and EKG reviewed  For headache overnight CT head performed revealing no acute abnormality. Normal neuro exam this AM. Headache resolved.   Pt stable for D/C home as per Dr. Garnett on ASA/Brilinta, Metoprolol, Losartan, Norvasc, Lipitor. Pt was switched from Plavix to Brilinta this admission.   See Dr. Cortes in 1 week. See Primary Doctor in 1 week  Pt seen and examined bedside.  Patient denies C/P, SOB, N/V, dizziness, palpitations, and diaphoresis.  Pt denies fever/chills, dysuria, abdominal pain, diarrhea, and cough  	  V/S 	BP: 140s/70s 	HR: 80s	RR: 16	     T: 98	O2: 98% RA   	  GEN: NAD  PULM:  CTA B/L  CARD: No JVD B/L, RRR, S1 and S2   ABD: +BS, NT, soft/ND	  EXT: No Edema B/L LE, Distal Pulses Intact and At Baseline  R GROIN: soft, no hematoma, no bleeding, no femoral bruit  NEURO: A+Ox3, no focal deficit, CN 2-12 intact, motor strength 5/5 in B/L UE and LE, sensation intact in all extremities                           10.8   5.5   )-----------( 318      ( 16 May 2018 07:13 )             33.4     05-16    140  |  102  |  15  ----------------------------<  154<H>  3.6   |  25  |  0.83    K 3.6 repleted w/ Kdur 40meq PO    Ca    9.3      16 May 2018 07:13  Mg     2.0     05-16    TPro  8.1  /  Alb  4.3  /  TBili  0.2  /  DBili  x   /  AST  22  /  ALT  12  /  AlkPhos  76  05-15 66 y/o F w/ PMHX HTN, HLD, CAD s/p prior PCI, s/p mid CABG LIMA-LAD, who presented for staged PCI.    Pt s/p cardiac angiogram on 5/15/18: IVUS/FFR/CHENG LM and Ostial LCX, LIMA-LAD patent  This AM VSS, Pt Asymptomatic, R Groin Stable, Labs and EKG reviewed  For headache overnight CT head performed revealing no acute abnormality. Normal neuro exam this AM. Headache resolved.   Pt stable for D/C home as per Dr. Garnett on ASA/Brilinta, Metoprolol, Losartan, Norvasc, Lipitor. Pt was switched from Plavix to Brilinta this admission.   See Dr. Cortes in 1 week. See Primary Doctor in 1 week  Pt seen and examined bedside.  Patient denies C/P, SOB, N/V, dizziness, palpitations, and diaphoresis.  Pt denies fever/chills, dysuria, abdominal pain, diarrhea, and cough  	  V/S 	BP: 140s/70s 	HR: 80s	RR: 16	     T: 98	O2: 98% RA   	  GEN: NAD  PULM:  CTA B/L  CARD: No JVD B/L, RRR, S1 and S2   ABD: +BS, NT, soft/ND	  EXT: No Edema B/L LE, Distal Pulses Intact and At Baseline  R GROIN: soft, no hematoma, no bleeding, no femoral bruit  NEURO: A+Ox3, no focal deficit, CN 2-12 intact, motor strength 5/5 in B/L UE and LE, sensation intact in all extremities                           10.8   5.5   )-----------( 318      ( 16 May 2018 07:13 )             33.4     05-16    140  |  102  |  15  ----------------------------<  154<H>  3.6   |  25  |  0.83    K 3.6 repleted w/ Kdur 40meq PO    Ca    9.3      16 May 2018 07:13  Mg     2.0     05-16    TPro  8.1  /  Alb  4.3  /  TBili  0.2  /  DBili  x   /  AST  22  /  ALT  12  /  AlkPhos  76  05-15

## 2018-05-16 NOTE — DISCHARGE NOTE ADULT - PATIENT PORTAL LINK FT
You can access the [facility] Portal, offered by [facility], by registering with the following website: [email or web address]

## 2018-05-21 DIAGNOSIS — I25.10 ATHEROSCLEROTIC HEART DISEASE OF NATIVE CORONARY ARTERY WITHOUT ANGINA PECTORIS: ICD-10-CM

## 2018-05-21 DIAGNOSIS — E78.5 HYPERLIPIDEMIA, UNSPECIFIED: ICD-10-CM

## 2018-05-21 DIAGNOSIS — Z79.02 LONG TERM (CURRENT) USE OF ANTITHROMBOTICS/ANTIPLATELETS: ICD-10-CM

## 2018-05-21 DIAGNOSIS — I10 ESSENTIAL (PRIMARY) HYPERTENSION: ICD-10-CM

## 2018-05-21 DIAGNOSIS — R73.02 IMPAIRED GLUCOSE TOLERANCE (ORAL): ICD-10-CM

## 2018-05-21 DIAGNOSIS — R51 HEADACHE: ICD-10-CM

## 2018-05-21 DIAGNOSIS — Z95.1 PRESENCE OF AORTOCORONARY BYPASS GRAFT: ICD-10-CM

## 2018-05-21 DIAGNOSIS — R94.39 ABNORMAL RESULT OF OTHER CARDIOVASCULAR FUNCTION STUDY: ICD-10-CM

## 2018-05-21 DIAGNOSIS — Z79.82 LONG TERM (CURRENT) USE OF ASPIRIN: ICD-10-CM

## 2018-05-21 DIAGNOSIS — Z82.49 FAMILY HISTORY OF ISCHEMIC HEART DISEASE AND OTHER DISEASES OF THE CIRCULATORY SYSTEM: ICD-10-CM

## 2018-05-21 DIAGNOSIS — Z95.5 PRESENCE OF CORONARY ANGIOPLASTY IMPLANT AND GRAFT: ICD-10-CM

## 2018-05-21 DIAGNOSIS — R11.10 VOMITING, UNSPECIFIED: ICD-10-CM

## 2018-10-23 ENCOUNTER — APPOINTMENT (OUTPATIENT)
Dept: CARDIOTHORACIC SURGERY | Facility: CLINIC | Age: 68
End: 2018-10-23

## 2019-02-10 PROBLEM — T82.897A OCCLUDED CORONARY ARTERY STENT: Status: RESOLVED | Noted: 2018-04-12 | Resolved: 2019-02-10

## 2020-01-22 ENCOUNTER — FORM ENCOUNTER (OUTPATIENT)
Age: 70
End: 2020-01-22

## 2020-01-23 ENCOUNTER — APPOINTMENT (OUTPATIENT)
Dept: RADIOLOGY | Facility: CLINIC | Age: 70
End: 2020-01-23

## 2020-01-23 ENCOUNTER — OUTPATIENT (OUTPATIENT)
Dept: OUTPATIENT SERVICES | Facility: HOSPITAL | Age: 70
LOS: 1 days | End: 2020-01-23
Payer: COMMERCIAL

## 2020-01-23 ENCOUNTER — APPOINTMENT (OUTPATIENT)
Dept: INTERNAL MEDICINE | Facility: CLINIC | Age: 70
End: 2020-01-23
Payer: MEDICARE

## 2020-01-23 VITALS
HEART RATE: 74 BPM | OXYGEN SATURATION: 98 % | BODY MASS INDEX: 29.59 KG/M2 | HEIGHT: 63 IN | WEIGHT: 167 LBS | DIASTOLIC BLOOD PRESSURE: 68 MMHG | TEMPERATURE: 97.4 F | SYSTOLIC BLOOD PRESSURE: 132 MMHG

## 2020-01-23 DIAGNOSIS — R07.9 CHEST PAIN, UNSPECIFIED: ICD-10-CM

## 2020-01-23 DIAGNOSIS — M54.2 CERVICALGIA: ICD-10-CM

## 2020-01-23 DIAGNOSIS — Z95.1 PRESENCE OF AORTOCORONARY BYPASS GRAFT: Chronic | ICD-10-CM

## 2020-01-23 DIAGNOSIS — Z98.890 OTHER SPECIFIED POSTPROCEDURAL STATES: Chronic | ICD-10-CM

## 2020-01-23 PROCEDURE — 99203 OFFICE O/P NEW LOW 30 MIN: CPT | Mod: 25

## 2020-01-23 PROCEDURE — 36415 COLL VENOUS BLD VENIPUNCTURE: CPT

## 2020-01-23 PROCEDURE — 71046 X-RAY EXAM CHEST 2 VIEWS: CPT | Mod: 26

## 2020-01-23 PROCEDURE — 71046 X-RAY EXAM CHEST 2 VIEWS: CPT

## 2020-01-23 PROCEDURE — 93000 ELECTROCARDIOGRAM COMPLETE: CPT

## 2020-01-25 LAB
ALBUMIN SERPL ELPH-MCNC: 4.5 G/DL
ALP BLD-CCNC: 81 U/L
ALT SERPL-CCNC: 15 U/L
ANION GAP SERPL CALC-SCNC: 16 MMOL/L
AST SERPL-CCNC: 25 U/L
BASOPHILS # BLD AUTO: 0.03 K/UL
BASOPHILS NFR BLD AUTO: 0.5 %
BILIRUB SERPL-MCNC: 0.3 MG/DL
BUN SERPL-MCNC: 15 MG/DL
CALCIUM SERPL-MCNC: 10.3 MG/DL
CHLORIDE SERPL-SCNC: 103 MMOL/L
CO2 SERPL-SCNC: 23 MMOL/L
CREAT SERPL-MCNC: 0.86 MG/DL
EOSINOPHIL # BLD AUTO: 0.25 K/UL
EOSINOPHIL NFR BLD AUTO: 4.3 %
ERYTHROCYTE [SEDIMENTATION RATE] IN BLOOD BY WESTERGREN METHOD: 80 MM/HR
GLUCOSE SERPL-MCNC: 83 MG/DL
HCT VFR BLD CALC: 40.5 %
HGB BLD-MCNC: 12 G/DL
IMM GRANULOCYTES NFR BLD AUTO: 0.2 %
LYMPHOCYTES # BLD AUTO: 2.77 K/UL
LYMPHOCYTES NFR BLD AUTO: 47.4 %
MAN DIFF?: NORMAL
MCHC RBC-ENTMCNC: 25.5 PG
MCHC RBC-ENTMCNC: 29.6 GM/DL
MCV RBC AUTO: 86 FL
MONOCYTES # BLD AUTO: 0.52 K/UL
MONOCYTES NFR BLD AUTO: 8.9 %
NEUTROPHILS # BLD AUTO: 2.27 K/UL
NEUTROPHILS NFR BLD AUTO: 38.7 %
PLATELET # BLD AUTO: 358 K/UL
POTASSIUM SERPL-SCNC: 4.8 MMOL/L
PROT SERPL-MCNC: 7.8 G/DL
RBC # BLD: 4.71 M/UL
RBC # FLD: 13.6 %
SAVE SPECIMEN: NORMAL
SODIUM SERPL-SCNC: 143 MMOL/L
TROPONIN I SERPL-MCNC: 0.01 NG/ML
TROPONIN-T, HIGH SENSITIVITY: <6 NG/L
WBC # FLD AUTO: 5.85 K/UL

## 2020-07-24 ENCOUNTER — LABORATORY RESULT (OUTPATIENT)
Age: 70
End: 2020-07-24

## 2020-07-24 ENCOUNTER — APPOINTMENT (OUTPATIENT)
Dept: INTERNAL MEDICINE | Facility: CLINIC | Age: 70
End: 2020-07-24
Payer: MEDICARE

## 2020-07-24 VITALS
RESPIRATION RATE: 18 BRPM | HEIGHT: 63 IN | SYSTOLIC BLOOD PRESSURE: 138 MMHG | BODY MASS INDEX: 31.71 KG/M2 | OXYGEN SATURATION: 98 % | HEART RATE: 72 BPM | WEIGHT: 179 LBS | TEMPERATURE: 97.7 F | DIASTOLIC BLOOD PRESSURE: 78 MMHG

## 2020-07-24 VITALS — SYSTOLIC BLOOD PRESSURE: 132 MMHG | DIASTOLIC BLOOD PRESSURE: 76 MMHG

## 2020-07-24 DIAGNOSIS — Z11.59 ENCOUNTER FOR SCREENING FOR OTHER VIRAL DISEASES: ICD-10-CM

## 2020-07-24 DIAGNOSIS — Z00.00 ENCOUNTER FOR GENERAL ADULT MEDICAL EXAMINATION W/OUT ABNORMAL FINDINGS: ICD-10-CM

## 2020-07-24 DIAGNOSIS — R06.00 DYSPNEA, UNSPECIFIED: ICD-10-CM

## 2020-07-24 PROCEDURE — 93000 ELECTROCARDIOGRAM COMPLETE: CPT

## 2020-07-24 PROCEDURE — 99205 OFFICE O/P NEW HI 60 MIN: CPT | Mod: 25

## 2020-07-24 PROCEDURE — 36415 COLL VENOUS BLD VENIPUNCTURE: CPT

## 2020-07-24 RX ORDER — ATORVASTATIN CALCIUM 40 MG/1
40 TABLET, FILM COATED ORAL DAILY
Qty: 90 | Refills: 3 | Status: ACTIVE | COMMUNITY

## 2020-07-24 RX ORDER — POTASSIUM CHLORIDE 750 MG/1
10 TABLET, EXTENDED RELEASE ORAL DAILY
Qty: 1 | Refills: 0 | Status: DISCONTINUED | COMMUNITY
End: 2020-07-24

## 2020-07-24 RX ORDER — LOSARTAN POTASSIUM 50 MG/1
50 TABLET, FILM COATED ORAL DAILY
Qty: 90 | Refills: 3 | Status: ACTIVE | COMMUNITY
Start: 2020-07-24

## 2020-07-24 RX ORDER — CLOPIDOGREL 75 MG/1
75 TABLET, FILM COATED ORAL DAILY
Qty: 30 | Refills: 3 | Status: DISCONTINUED | COMMUNITY
End: 2020-07-24

## 2020-07-24 RX ORDER — FUROSEMIDE 20 MG/1
20 TABLET ORAL DAILY
Qty: 30 | Refills: 1 | Status: DISCONTINUED | COMMUNITY
End: 2020-07-24

## 2020-07-24 RX ORDER — TRAMADOL HYDROCHLORIDE 50 MG/1
50 TABLET, COATED ORAL EVERY 6 HOURS
Refills: 0 | Status: DISCONTINUED | COMMUNITY
End: 2020-07-24

## 2020-07-24 RX ORDER — PREDNISONE 10 MG
TABLET ORAL
Refills: 0 | Status: DISCONTINUED | COMMUNITY
End: 2020-07-24

## 2020-07-24 NOTE — ASSESSMENT
[FreeTextEntry1] : Hypertension appears to be well compensated, borderline. Same Rx for now.\par \par Blood work results are pending. Also urine and antibodies\par \par next  Visit-EKG and spirometry.\par \par \par \par \par \par

## 2020-07-24 NOTE — HEALTH RISK ASSESSMENT
[Very Good] : ~his/her~  mood as very good [No] : No [No falls in past year] : Patient reported no falls in the past year [0] : 2) Feeling down, depressed, or hopeless: Not at all (0) [Hepatitis C test declined] : Hepatitis C test declined [HIV test declined] : HIV test declined [Alone] : lives alone [None] : None [] :  [Feels Safe at Home] : Feels safe at home [Fully functional (bathing, dressing, toileting, transferring, walking, feeding)] : Fully functional (bathing, dressing, toileting, transferring, walking, feeding) [Fully functional (using the telephone, shopping, preparing meals, housekeeping, doing laundry, using] : Fully functional and needs no help or supervision to perform IADLs (using the telephone, shopping, preparing meals, housekeeping, doing laundry, using transportation, managing medications and managing finances) [Smoke Detector] : smoke detector [Seat Belt] :  uses seat belt [Carbon Monoxide Detector] : carbon monoxide detector [Patient/Caregiver unclear of wishes] : Patient/Caregiver unclear of wishes [FreeTextEntry1] : gen. health [] : No [de-identified] : Cardiothoracic surgery [de-identified] : Limited walking [de-identified] : Generally healthy [[id number]] : 0 [Language] : denies difficulty with language [Change in mental status noted] : No change in mental status noted [Handling Complex Tasks] : denies difficulty handling complex tasks [Behavior] : denies difficulty with behavior [Learning/Retaining New Information] : denies difficulty learning/retaining new information [Reasoning] : denies difficulty with reasoning [Spatial Ability and Orientation] : denies difficulty with spatial ability and orientation [High Risk Behavior] : no high risk behavior [Sexually Active] : not sexually active [Reports changes in hearing] : Reports no changes in hearing [Reports changes in vision] : Reports no changes in vision [Reports changes in dental health] : Reports no changes in dental health [Guns at Home] : no guns at home [Sunscreen] : does not use sunscreen [Travel to Developing Areas] : does not  travel to developing areas [TB Exposure] : is not being exposed to tuberculosis [Caregiver Concerns] : does not have caregiver concerns [FreeTextEntry2] : 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

## 2020-07-24 NOTE — HISTORY OF PRESENT ILLNESS
[FreeTextEntry1] : Comprehensive annual examination [de-identified] : The patient had a coronary artery bypass in 4/18.  She has done well since but has limited exertional tolerance No chest pain or edema. She is able to climb 13 steps slowly with mild dyspnea.

## 2020-07-24 NOTE — REVIEW OF SYSTEMS
[Dyspnea on Exertion] : dyspnea on exertion [Negative] : Musculoskeletal [FreeTextEntry5] : See present illness [FreeTextEntry6] : See present illness

## 2020-07-24 NOTE — DATA REVIEWED
[FreeTextEntry1] : Electrocardiogram reveals a sinus rhythm with an RSR in V1 and left atrial enlargement-borderline ECG.\par \par Chest x-ray was performed 1/20-clear lungs\par \par Blood work and urinalysis were drawn, results pending

## 2020-07-26 LAB
ALBUMIN SERPL ELPH-MCNC: 4.9 G/DL
ALP BLD-CCNC: 74 U/L
ALT SERPL-CCNC: 8 U/L
ANION GAP SERPL CALC-SCNC: 12 MMOL/L
APPEARANCE: CLEAR
AST SERPL-CCNC: 25 U/L
BASOPHILS # BLD AUTO: 0.03 K/UL
BASOPHILS NFR BLD AUTO: 0.7 %
BILIRUB SERPL-MCNC: 0.3 MG/DL
BILIRUBIN URINE: NEGATIVE
BLOOD URINE: NEGATIVE
BUN SERPL-MCNC: 12 MG/DL
CALCIUM SERPL-MCNC: 10 MG/DL
CHLORIDE SERPL-SCNC: 102 MMOL/L
CHOLEST SERPL-MCNC: 211 MG/DL
CHOLEST/HDLC SERPL: 4.2 RATIO
CO2 SERPL-SCNC: 26 MMOL/L
COLOR: YELLOW
CREAT SERPL-MCNC: 0.86 MG/DL
EOSINOPHIL # BLD AUTO: 0.37 K/UL
EOSINOPHIL NFR BLD AUTO: 8.5 %
ESTIMATED AVERAGE GLUCOSE: 126 MG/DL
GLUCOSE QUALITATIVE U: NEGATIVE
GLUCOSE SERPL-MCNC: 95 MG/DL
HBA1C MFR BLD HPLC: 6 %
HCT VFR BLD CALC: 38.5 %
HDLC SERPL-MCNC: 51 MG/DL
HGB BLD-MCNC: 11.7 G/DL
IMM GRANULOCYTES NFR BLD AUTO: 0 %
KETONES URINE: NEGATIVE
LDLC SERPL CALC-MCNC: 136 MG/DL
LEUKOCYTE ESTERASE URINE: NEGATIVE
LYMPHOCYTES # BLD AUTO: 2.37 K/UL
LYMPHOCYTES NFR BLD AUTO: 54.4 %
MAN DIFF?: NORMAL
MCHC RBC-ENTMCNC: 26.6 PG
MCHC RBC-ENTMCNC: 30.4 GM/DL
MCV RBC AUTO: 87.5 FL
MONOCYTES # BLD AUTO: 0.39 K/UL
MONOCYTES NFR BLD AUTO: 8.9 %
NEUTROPHILS # BLD AUTO: 1.2 K/UL
NEUTROPHILS NFR BLD AUTO: 27.5 %
NITRITE URINE: NEGATIVE
PH URINE: 6
PLATELET # BLD AUTO: 306 K/UL
POTASSIUM SERPL-SCNC: 4.1 MMOL/L
PROT SERPL-MCNC: 8 G/DL
PROTEIN URINE: NORMAL
RBC # BLD: 4.4 M/UL
RBC # FLD: 13.2 %
SARS-COV-2 IGG SERPL IA-ACNC: 135 INDEX
SARS-COV-2 IGG SERPL QL IA: POSITIVE
SODIUM SERPL-SCNC: 141 MMOL/L
SPECIFIC GRAVITY URINE: 1.03
T3RU NFR SERPL: 1.1 TBI
T4 SERPL-MCNC: 6.2 UG/DL
TRIGL SERPL-MCNC: 120 MG/DL
TSH SERPL-ACNC: 0.78 UIU/ML
UROBILINOGEN URINE: NORMAL
WBC # FLD AUTO: 4.36 K/UL

## 2020-11-24 ENCOUNTER — NON-APPOINTMENT (OUTPATIENT)
Age: 70
End: 2020-11-24

## 2020-11-24 ENCOUNTER — APPOINTMENT (OUTPATIENT)
Dept: INTERNAL MEDICINE | Facility: CLINIC | Age: 70
End: 2020-11-24
Payer: MEDICARE

## 2020-11-24 VITALS
SYSTOLIC BLOOD PRESSURE: 134 MMHG | RESPIRATION RATE: 18 BRPM | WEIGHT: 178 LBS | TEMPERATURE: 97.2 F | HEART RATE: 70 BPM | DIASTOLIC BLOOD PRESSURE: 78 MMHG | OXYGEN SATURATION: 98 % | HEIGHT: 63 IN | BODY MASS INDEX: 31.54 KG/M2

## 2020-11-24 DIAGNOSIS — Z95.1 PRESENCE OF AORTOCORONARY BYPASS GRAFT: ICD-10-CM

## 2020-11-24 DIAGNOSIS — K59.09 OTHER CONSTIPATION: ICD-10-CM

## 2020-11-24 DIAGNOSIS — R10.9 UNSPECIFIED ABDOMINAL PAIN: ICD-10-CM

## 2020-11-24 DIAGNOSIS — M15.9 POLYOSTEOARTHRITIS, UNSPECIFIED: ICD-10-CM

## 2020-11-24 DIAGNOSIS — E78.00 PURE HYPERCHOLESTEROLEMIA, UNSPECIFIED: ICD-10-CM

## 2020-11-24 PROCEDURE — 93000 ELECTROCARDIOGRAM COMPLETE: CPT

## 2020-11-24 PROCEDURE — 99214 OFFICE O/P EST MOD 30 MIN: CPT | Mod: 25

## 2020-11-24 PROCEDURE — 36415 COLL VENOUS BLD VENIPUNCTURE: CPT

## 2020-11-24 NOTE — HEALTH RISK ASSESSMENT
[No] : In the past 12 months have you used drugs other than those required for medical reasons? No [No falls in past year] : Patient reported no falls in the past year [0] : 2) Feeling down, depressed, or hopeless: Not at all (0) [] : No [de-identified] : none [de-identified] : Sedentary [de-identified] : Healthy,nancy roman with dentures [[id number]] : 0

## 2020-11-24 NOTE — ASSESSMENT
[FreeTextEntry1] : Chronic constipation will be addressed with MiraLax daily. . She will schedule a colonoscopy last was 5 years ago. she will followup with her dentist to get a better fit for her dentures. Osteoarthritis will be addressed with Tylenol p.r.n.\par Cardiovascular status appears stable. Same Rx  blood pressure control is satisfactory\par \par Next visit EKG, blood work

## 2020-11-24 NOTE — REVIEW OF SYSTEMS
[Dyspnea on Exertion] : dyspnea on exertion [Abdominal Pain] : abdominal pain [Constipation] : constipation [Joint Pain] : joint pain [Joint Stiffness] : joint stiffness [Negative] : Integumentary [FreeTextEntry4] : Multiple dental visits and dentures still do not fit properly [FreeTextEntry5] : See present illness [FreeTextEntry6] : See present illness [FreeTextEntry7] : left upper quadrant abdominal discomfort for several weeks.,  No nausea or vomiting. Bowel pattern is usua see present illness

## 2020-11-24 NOTE — DATA REVIEWED
[FreeTextEntry1] : Electrocardiogram reveals a sinus rhythm with possible left atrial enlargement-borderline ECG.\par Blood work results are pending.

## 2020-11-24 NOTE — HISTORY OF PRESENT ILLNESS
[FreeTextEntry1] : Followup for hypertension, generalized osteoarthritis, chronic constipation [de-identified] : She has had bilateral knee aches, and some left ankle discomfort. No shortness of breath or chest pain\par \par Chronic constipation.All of her life.She has daily bowel movements but usually hard At first.

## 2020-11-30 LAB
ALBUMIN SERPL ELPH-MCNC: 4.7 G/DL
ALP BLD-CCNC: 80 U/L
ALT SERPL-CCNC: 13 U/L
AMYLASE/CREAT SERPL: 110 U/L
ANION GAP SERPL CALC-SCNC: 11 MMOL/L
AST SERPL-CCNC: 27 U/L
BASOPHILS # BLD AUTO: 0.03 K/UL
BASOPHILS NFR BLD AUTO: 0.7 %
BILIRUB SERPL-MCNC: 0.3 MG/DL
BUN SERPL-MCNC: 14 MG/DL
CALCIUM SERPL-MCNC: 9.6 MG/DL
CHLORIDE SERPL-SCNC: 106 MMOL/L
CHOLEST SERPL-MCNC: 194 MG/DL
CO2 SERPL-SCNC: 26 MMOL/L
CREAT SERPL-MCNC: 0.81 MG/DL
EOSINOPHIL # BLD AUTO: 0.27 K/UL
EOSINOPHIL NFR BLD AUTO: 6.4 %
GLUCOSE SERPL-MCNC: 95 MG/DL
HCT VFR BLD CALC: 40.2 %
HDLC SERPL-MCNC: 55 MG/DL
HGB BLD-MCNC: 12.1 G/DL
IMM GRANULOCYTES NFR BLD AUTO: 0 %
LDLC SERPL CALC-MCNC: 124 MG/DL
LPL SERPL-CCNC: 34 U/L
LYMPHOCYTES # BLD AUTO: 2.06 K/UL
LYMPHOCYTES NFR BLD AUTO: 48.6 %
MAN DIFF?: NORMAL
MCHC RBC-ENTMCNC: 26.4 PG
MCHC RBC-ENTMCNC: 30.1 GM/DL
MCV RBC AUTO: 87.6 FL
MONOCYTES # BLD AUTO: 0.45 K/UL
MONOCYTES NFR BLD AUTO: 10.6 %
NEUTROPHILS # BLD AUTO: 1.43 K/UL
NEUTROPHILS NFR BLD AUTO: 33.7 %
NONHDLC SERPL-MCNC: 139 MG/DL
PLATELET # BLD AUTO: 318 K/UL
POTASSIUM SERPL-SCNC: 4.9 MMOL/L
PROT SERPL-MCNC: 7.5 G/DL
RBC # BLD: 4.59 M/UL
RBC # FLD: 13.6 %
SODIUM SERPL-SCNC: 143 MMOL/L
T3 SERPL-MCNC: 114 NG/DL
T4 SERPL-MCNC: 6.5 UG/DL
TRIGL SERPL-MCNC: 77 MG/DL
TSH SERPL-ACNC: 1.01 UIU/ML
WBC # FLD AUTO: 4.24 K/UL

## 2021-01-25 ENCOUNTER — INPATIENT (INPATIENT)
Facility: HOSPITAL | Age: 71
LOS: 1 days | Discharge: HOME | End: 2021-01-27
Attending: INTERNAL MEDICINE | Admitting: INTERNAL MEDICINE
Payer: MEDICARE

## 2021-01-25 VITALS
RESPIRATION RATE: 18 BRPM | OXYGEN SATURATION: 99 % | DIASTOLIC BLOOD PRESSURE: 83 MMHG | SYSTOLIC BLOOD PRESSURE: 156 MMHG | HEART RATE: 87 BPM | TEMPERATURE: 97 F

## 2021-01-25 DIAGNOSIS — R07.9 CHEST PAIN, UNSPECIFIED: ICD-10-CM

## 2021-01-25 DIAGNOSIS — I10 ESSENTIAL (PRIMARY) HYPERTENSION: ICD-10-CM

## 2021-01-25 DIAGNOSIS — Z29.9 ENCOUNTER FOR PROPHYLACTIC MEASURES, UNSPECIFIED: ICD-10-CM

## 2021-01-25 DIAGNOSIS — Z95.1 PRESENCE OF AORTOCORONARY BYPASS GRAFT: Chronic | ICD-10-CM

## 2021-01-25 LAB
ALBUMIN SERPL ELPH-MCNC: 4.3 G/DL — SIGNIFICANT CHANGE UP (ref 3.3–5)
ALP SERPL-CCNC: 78 U/L — SIGNIFICANT CHANGE UP (ref 40–120)
ALT FLD-CCNC: 11 U/L — SIGNIFICANT CHANGE UP (ref 4–33)
ANION GAP SERPL CALC-SCNC: 12 MMOL/L — SIGNIFICANT CHANGE UP (ref 7–14)
APTT BLD: 31.1 SEC — SIGNIFICANT CHANGE UP (ref 27–36.3)
AST SERPL-CCNC: 21 U/L — SIGNIFICANT CHANGE UP (ref 4–32)
BASOPHILS # BLD AUTO: 0.02 K/UL — SIGNIFICANT CHANGE UP (ref 0–0.2)
BASOPHILS NFR BLD AUTO: 0.2 % — SIGNIFICANT CHANGE UP (ref 0–2)
BILIRUB SERPL-MCNC: 0.3 MG/DL — SIGNIFICANT CHANGE UP (ref 0.2–1.2)
BUN SERPL-MCNC: 13 MG/DL — SIGNIFICANT CHANGE UP (ref 7–23)
CALCIUM SERPL-MCNC: 9.7 MG/DL — SIGNIFICANT CHANGE UP (ref 8.4–10.5)
CHLORIDE SERPL-SCNC: 100 MMOL/L — SIGNIFICANT CHANGE UP (ref 98–107)
CO2 SERPL-SCNC: 26 MMOL/L — SIGNIFICANT CHANGE UP (ref 22–31)
CREAT SERPL-MCNC: 0.87 MG/DL — SIGNIFICANT CHANGE UP (ref 0.5–1.3)
EOSINOPHIL # BLD AUTO: 0.12 K/UL — SIGNIFICANT CHANGE UP (ref 0–0.5)
EOSINOPHIL NFR BLD AUTO: 1.5 % — SIGNIFICANT CHANGE UP (ref 0–6)
GLUCOSE SERPL-MCNC: 104 MG/DL — HIGH (ref 70–99)
HCT VFR BLD CALC: 39.1 % — SIGNIFICANT CHANGE UP (ref 34.5–45)
HGB BLD-MCNC: 11.6 G/DL — SIGNIFICANT CHANGE UP (ref 11.5–15.5)
IANC: 5.87 K/UL — SIGNIFICANT CHANGE UP (ref 1.5–8.5)
IMM GRANULOCYTES NFR BLD AUTO: 0.2 % — SIGNIFICANT CHANGE UP (ref 0–1.5)
INR BLD: 1.28 RATIO — HIGH (ref 0.88–1.16)
LYMPHOCYTES # BLD AUTO: 1.51 K/UL — SIGNIFICANT CHANGE UP (ref 1–3.3)
LYMPHOCYTES # BLD AUTO: 18.8 % — SIGNIFICANT CHANGE UP (ref 13–44)
MCHC RBC-ENTMCNC: 25.2 PG — LOW (ref 27–34)
MCHC RBC-ENTMCNC: 29.7 GM/DL — LOW (ref 32–36)
MCV RBC AUTO: 85 FL — SIGNIFICANT CHANGE UP (ref 80–100)
MONOCYTES # BLD AUTO: 0.49 K/UL — SIGNIFICANT CHANGE UP (ref 0–0.9)
MONOCYTES NFR BLD AUTO: 6.1 % — SIGNIFICANT CHANGE UP (ref 2–14)
NEUTROPHILS # BLD AUTO: 5.87 K/UL — SIGNIFICANT CHANGE UP (ref 1.8–7.4)
NEUTROPHILS NFR BLD AUTO: 73.2 % — SIGNIFICANT CHANGE UP (ref 43–77)
NRBC # BLD: 0 /100 WBCS — SIGNIFICANT CHANGE UP
NRBC # FLD: 0 K/UL — SIGNIFICANT CHANGE UP
PLATELET # BLD AUTO: 352 K/UL — SIGNIFICANT CHANGE UP (ref 150–400)
POTASSIUM SERPL-MCNC: 3.8 MMOL/L — SIGNIFICANT CHANGE UP (ref 3.5–5.3)
POTASSIUM SERPL-SCNC: 3.8 MMOL/L — SIGNIFICANT CHANGE UP (ref 3.5–5.3)
PROT SERPL-MCNC: 7.6 G/DL — SIGNIFICANT CHANGE UP (ref 6–8.3)
PROTHROM AB SERPL-ACNC: 14.4 SEC — HIGH (ref 10.6–13.6)
RBC # BLD: 4.6 M/UL — SIGNIFICANT CHANGE UP (ref 3.8–5.2)
RBC # FLD: 12.8 % — SIGNIFICANT CHANGE UP (ref 10.3–14.5)
SARS-COV-2 RNA SPEC QL NAA+PROBE: SIGNIFICANT CHANGE UP
SODIUM SERPL-SCNC: 138 MMOL/L — SIGNIFICANT CHANGE UP (ref 135–145)
TROPONIN T, HIGH SENSITIVITY RESULT: 7 NG/L — SIGNIFICANT CHANGE UP
TROPONIN T, HIGH SENSITIVITY RESULT: 7 NG/L — SIGNIFICANT CHANGE UP
WBC # BLD: 8.03 K/UL — SIGNIFICANT CHANGE UP (ref 3.8–10.5)
WBC # FLD AUTO: 8.03 K/UL — SIGNIFICANT CHANGE UP (ref 3.8–10.5)

## 2021-01-25 PROCEDURE — 71046 X-RAY EXAM CHEST 2 VIEWS: CPT | Mod: 26

## 2021-01-25 PROCEDURE — 99285 EMERGENCY DEPT VISIT HI MDM: CPT

## 2021-01-25 PROCEDURE — 99223 1ST HOSP IP/OBS HIGH 75: CPT

## 2021-01-25 RX ORDER — ACETAMINOPHEN 500 MG
650 TABLET ORAL EVERY 6 HOURS
Refills: 0 | Status: DISCONTINUED | OUTPATIENT
Start: 2021-01-25 | End: 2021-01-27

## 2021-01-25 RX ORDER — ATORVASTATIN CALCIUM 80 MG/1
40 TABLET, FILM COATED ORAL AT BEDTIME
Refills: 0 | Status: DISCONTINUED | OUTPATIENT
Start: 2021-01-25 | End: 2021-01-27

## 2021-01-25 RX ORDER — METOPROLOL TARTRATE 50 MG
50 TABLET ORAL DAILY
Refills: 0 | Status: DISCONTINUED | OUTPATIENT
Start: 2021-01-25 | End: 2021-01-27

## 2021-01-25 RX ORDER — ENOXAPARIN SODIUM 100 MG/ML
40 INJECTION SUBCUTANEOUS DAILY
Refills: 0 | Status: DISCONTINUED | OUTPATIENT
Start: 2021-01-25 | End: 2021-01-27

## 2021-01-25 RX ORDER — AMLODIPINE BESYLATE 2.5 MG/1
5 TABLET ORAL DAILY
Refills: 0 | Status: DISCONTINUED | OUTPATIENT
Start: 2021-01-25 | End: 2021-01-27

## 2021-01-25 RX ORDER — LOSARTAN POTASSIUM 100 MG/1
50 TABLET, FILM COATED ORAL DAILY
Refills: 0 | Status: DISCONTINUED | OUTPATIENT
Start: 2021-01-25 | End: 2021-01-27

## 2021-01-25 RX ADMIN — ATORVASTATIN CALCIUM 40 MILLIGRAM(S): 80 TABLET, FILM COATED ORAL at 21:47

## 2021-01-25 NOTE — ED PROVIDER NOTE - OBJECTIVE STATEMENT
70F h/o CAD presents with chest pain since this morning.  Pain associated with movement, and radiates to left shoulder.  Feels similar to previous episode of MI.  Denies fever/chills, sob, n/v/d.

## 2021-01-25 NOTE — H&P ADULT - NSHPREVIEWOFSYSTEMS_GEN_ALL_CORE
CONSTITUTIONAL: No weakness, fevers or chills  EYES/ENT: No visual changes;  No vertigo or throat pain   NECK: No pain or stiffness  RESPIRATORY: No cough, wheezing, hemoptysis; No shortness of breath  CARDIOVASCULAR: pleuritic chest pain  GASTROINTESTINAL: No abdominal or epigastric pain. No nausea, vomiting, or hematemesis; No diarrhea or constipation. No melena or hematochezia.  GENITOURINARY: No dysuria, frequency or hematuria  NEUROLOGICAL: No numbness or weakness  SKIN: No itching, burning, rashes, or lesions   PSYCH: No Depression, no anxiety  All other review of systems is negative unless indicated above.

## 2021-01-25 NOTE — ED PROVIDER NOTE - ATTENDING CONTRIBUTION TO CARE
Dr. Sexton:  I performed a face to face bedside interview with patient regarding history of present illness, review of symptoms and past medical history. I completed an independent physical exam.  I have discussed patient's plan of care with PA.   I agree with note as stated above, having amended the EMR as needed to reflect my findings.   This includes HISTORY OF PRESENT ILLNESS, HIV, PAST MEDICAL/SURGICAL/FAMILY/SOCIAL HISTORY, ALLERGIES AND HOME MEDICATIONS, REVIEW OF SYSTEMS, PHYSICAL EXAM, and any PROGRESS NOTES during the time I functioned as the attending physician for this patient.    70F h/o CAD presents with chest pain since this morning.  Pain associated with movement.  Christiano fever/chills, sob, n/v/d.    Exam:  - nad  - rrr   -ctab   -abd soft ntnd    A/P  - CP, eval ACS   -cbc, cmp, trop, coags, ekg, cxr

## 2021-01-25 NOTE — H&P ADULT - HISTORY OF PRESENT ILLNESS
69yo F hx of CAD/MI s/p CABG, stent last 2018, HTN presented with cc of CP. Patient reported chest pain started overnight. She felt mid-upper chest pain in the early morning as she wakes up. Patient acknowledged the pleuritic nature of this pain. Patient felt chest pain is radiating to her left side shoulder. Patient reported, as of yesterday, she was able to ambulate one block or climbing one flight of stairs without SANTOS or chest pain. Also patient noted this chest pain is similar to her previous episode felt during her MI in 2018. Patient otherwise denied n/v, f/c, cough, LE swelling, dizziness, palpitations.

## 2021-01-25 NOTE — H&P ADULT - NSHPLABSRESULTS_GEN_ALL_CORE
(01-25 @ 11:18)                      11.6  8.03 )-----------( 352                 39.1    Neutrophils = 5.87 (73.2%)  Lymphocytes = 1.51 (18.8%)  Eosinophils = 0.12 (1.5%)  Basophils = 0.02 (0.2%)  Monocytes = 0.49 (6.1%)  Bands = --%    01-25    138  |  100  |  13  ----------------------------<  104<H>  3.8   |  26  |  0.87    Ca    9.7      25 Jan 2021 11:18    TPro  7.6  /  Alb  4.3  /  TBili  0.3  /  DBili  x   /  AST  21  /  ALT  11  /  AlkPhos  78  01-25    ( 25 Jan 2021 11:18 )   PT: 14.4 sec;   INR: 1.28 ratio;       PTT:31.1 sec    EKG: Q waves in inferior leads of unknown chronicity. No acute ST changes.

## 2021-01-25 NOTE — H&P ADULT - NSHPPHYSICALEXAM_GEN_ALL_CORE
T(C): 36.8 (01-25-21 @ 16:58), Max: 36.8 (01-25-21 @ 16:58)  HR: 77 (01-25-21 @ 14:36) (71 - 87)  BP: 146/68 (01-25-21 @ 16:58) (14/53 - 165/68)  RR: 19 (01-25-21 @ 16:58) (17 - 19)  SpO2: 100% (01-25-21 @ 16:58) (99% - 100%)  Wt(kg): --  GENERAL: NAD, well-developed  HEAD:  Atraumatic, Normocephalic  EYES: EOMI, PERRLA, conjunctiva and sclera clear  NECK: Supple, No JVD  CHEST/LUNG: Clear to auscultation bilaterally; No wheeze  HEART: Regular rate and rhythm; No murmurs, rubs, or gallops. Mid chest tender point.  ABDOMEN: Soft, Nontender, Nondistended; Bowel sounds present  EXTREMITIES:  2+ Peripheral Pulses, No clubbing, cyanosis, or edema  PSYCH: AAOx3  NEUROLOGY: non-focal  SKIN: No rashes or lesions

## 2021-01-25 NOTE — ED ADULT NURSE NOTE - OBJECTIVE STATEMENT
pt received in rm 29 AAO x 3. pt reports midsternal chest pain worse with inspiration radiating to left side of chest that began this morning. pt denies sob, n/v/d, fevers, chills, cough, sick contacts. pt appears in NAD at this time. respirations even and unlabored. pt arrived with 20g iv placed to left ac by EMS. sinus rhythm on monitor. will continue to monitor.

## 2021-01-26 LAB
ALBUMIN SERPL ELPH-MCNC: 3.9 G/DL — SIGNIFICANT CHANGE UP (ref 3.3–5)
ALP SERPL-CCNC: 75 U/L — SIGNIFICANT CHANGE UP (ref 40–120)
ALT FLD-CCNC: 9 U/L — SIGNIFICANT CHANGE UP (ref 4–33)
ANION GAP SERPL CALC-SCNC: 11 MMOL/L — SIGNIFICANT CHANGE UP (ref 7–14)
AST SERPL-CCNC: 20 U/L — SIGNIFICANT CHANGE UP (ref 4–32)
BILIRUB SERPL-MCNC: 0.3 MG/DL — SIGNIFICANT CHANGE UP (ref 0.2–1.2)
BUN SERPL-MCNC: 13 MG/DL — SIGNIFICANT CHANGE UP (ref 7–23)
CALCIUM SERPL-MCNC: 10 MG/DL — SIGNIFICANT CHANGE UP (ref 8.4–10.5)
CHLORIDE SERPL-SCNC: 101 MMOL/L — SIGNIFICANT CHANGE UP (ref 98–107)
CO2 SERPL-SCNC: 25 MMOL/L — SIGNIFICANT CHANGE UP (ref 22–31)
CREAT SERPL-MCNC: 0.88 MG/DL — SIGNIFICANT CHANGE UP (ref 0.5–1.3)
GLUCOSE SERPL-MCNC: 104 MG/DL — HIGH (ref 70–99)
HCT VFR BLD CALC: 37.7 % — SIGNIFICANT CHANGE UP (ref 34.5–45)
HCV AB S/CO SERPL IA: 0.12 S/CO — SIGNIFICANT CHANGE UP (ref 0–0.99)
HCV AB SERPL-IMP: SIGNIFICANT CHANGE UP
HGB BLD-MCNC: 11.8 G/DL — SIGNIFICANT CHANGE UP (ref 11.5–15.5)
MAGNESIUM SERPL-MCNC: 2 MG/DL — SIGNIFICANT CHANGE UP (ref 1.6–2.6)
MCHC RBC-ENTMCNC: 25.9 PG — LOW (ref 27–34)
MCHC RBC-ENTMCNC: 31.3 GM/DL — LOW (ref 32–36)
MCV RBC AUTO: 82.9 FL — SIGNIFICANT CHANGE UP (ref 80–100)
NRBC # BLD: 0 /100 WBCS — SIGNIFICANT CHANGE UP
NRBC # FLD: 0 K/UL — SIGNIFICANT CHANGE UP
PLATELET # BLD AUTO: 340 K/UL — SIGNIFICANT CHANGE UP (ref 150–400)
POTASSIUM SERPL-MCNC: 4.3 MMOL/L — SIGNIFICANT CHANGE UP (ref 3.5–5.3)
POTASSIUM SERPL-SCNC: 4.3 MMOL/L — SIGNIFICANT CHANGE UP (ref 3.5–5.3)
PROT SERPL-MCNC: 7.8 G/DL — SIGNIFICANT CHANGE UP (ref 6–8.3)
RBC # BLD: 4.55 M/UL — SIGNIFICANT CHANGE UP (ref 3.8–5.2)
RBC # FLD: 13 % — SIGNIFICANT CHANGE UP (ref 10.3–14.5)
SODIUM SERPL-SCNC: 137 MMOL/L — SIGNIFICANT CHANGE UP (ref 135–145)
WBC # BLD: 6.3 K/UL — SIGNIFICANT CHANGE UP (ref 3.8–10.5)
WBC # FLD AUTO: 6.3 K/UL — SIGNIFICANT CHANGE UP (ref 3.8–10.5)

## 2021-01-26 PROCEDURE — 99223 1ST HOSP IP/OBS HIGH 75: CPT

## 2021-01-26 PROCEDURE — 78452 HT MUSCLE IMAGE SPECT MULT: CPT | Mod: 26

## 2021-01-26 PROCEDURE — 93016 CV STRESS TEST SUPVJ ONLY: CPT | Mod: GC

## 2021-01-26 PROCEDURE — 93018 CV STRESS TEST I&R ONLY: CPT | Mod: GC

## 2021-01-26 PROCEDURE — 93306 TTE W/DOPPLER COMPLETE: CPT | Mod: 26

## 2021-01-26 RX ADMIN — LOSARTAN POTASSIUM 50 MILLIGRAM(S): 100 TABLET, FILM COATED ORAL at 05:42

## 2021-01-26 RX ADMIN — Medication 50 MILLIGRAM(S): at 05:42

## 2021-01-26 RX ADMIN — AMLODIPINE BESYLATE 5 MILLIGRAM(S): 2.5 TABLET ORAL at 05:42

## 2021-01-26 RX ADMIN — ATORVASTATIN CALCIUM 40 MILLIGRAM(S): 80 TABLET, FILM COATED ORAL at 21:16

## 2021-01-26 NOTE — CONSULT NOTE ADULT - ASSESSMENT
Patient is a 69 yo woman with CAD/MI s/p CABG, stent last 2018, HTN presented with cc of CP. Patient reported chest pain started overnight. She felt mid-upper chest pain in the early morning as she wakes up. Patient acknowledged the pleuritic nature of this pain. Patient felt chest pain is radiating to her left side shoulder. Patient reported, as of yesterday, she was able to ambulate one block or climbing one flight of stairs without SANTOS or chest pain. Also patient noted this chest pain is similar to her previous episode felt during her MI in 2018. Patient otherwise denied n/v, f/c, cough, LE swelling, dizziness, palpitations. (25 Jan 2021 17:11)    At the time of my evaluation this AM, the patient appeared hemodynamically and clinically stable. No interval events noted on bedside telemetry.  Physical examination was unremarkable. She appeared euvolemic on exam.    At this time, recommend:  1. Continue to monitor on telemetry.  2. Continue current medications for her history of CAD: ASA, statin, BB, ARB.  She is also on amlodipine for HTN.  3. Echocardiogram and NST pending.  4. Dispo planning pending findings from tests.

## 2021-01-26 NOTE — CONSULT NOTE ADULT - SUBJECTIVE AND OBJECTIVE BOX
Cardiology/Vascular Medicine Inpatient Consultation Note    HISTORY OF PRESENT ILLNESS:  Patient is a 71 yo woman with CAD/MI s/p CABG, stent last 2018, HTN presented with cc of CP. Patient reported chest pain started overnight. She felt mid-upper chest pain in the early morning as she wakes up. Patient acknowledged the pleuritic nature of this pain. Patient felt chest pain is radiating to her left side shoulder. Patient reported, as of yesterday, she was able to ambulate one block or climbing one flight of stairs without SANTOS or chest pain. Also patient noted this chest pain is similar to her previous episode felt during her MI in 2018. Patient otherwise denied n/v, f/c, cough, LE swelling, dizziness, palpitations. (25 Jan 2021 17:11)    At the time of my evaluation this AM, the patient appeared hemodynamically and clinically stable. No interval events noted on bedside telemetry.  Physical examination was unremarkable. She appeared euvolemic on exam.    At this time, recommend:  1. Continue to monitor on telemetry.  2. Continue current medications for her history of CAD: ASA, statin, BB, ARB.  She is also on amlodipine for HTN.  3. Echocardiogram and NST pending.  4. Dispo planning pending findings from tests.      Allergies  No Known Allergies    MEDICATIONS:  amLODIPine   Tablet 5 milliGRAM(s) Oral daily  enoxaparin Injectable 40 milliGRAM(s) SubCutaneous daily  losartan 50 milliGRAM(s) Oral daily  metoprolol succinate ER 50 milliGRAM(s) Oral daily  acetaminophen   Tablet .. 650 milliGRAM(s) Oral every 6 hours PRN  atorvastatin 40 milliGRAM(s) Oral at bedtime    PAST MEDICAL & SURGICAL HISTORY:  Essential hypertension  CAD (coronary artery disease)  S/P CABG (coronary artery bypass graft)    FAMILY HISTORY:  [place]    SOCIAL HISTORY:    As above, as per chart notes    REVIEW OF SYSTEMS:  As above, as per chart notes    PHYSICAL EXAM:  T(C): 36.7 (01-26-21 @ 09:05), Max: 36.9 (01-26-21 @ 03:02)  HR: 70 (01-26-21 @ 09:05) (70 - 81)  BP: 112/58 (01-26-21 @ 09:05) (14/53 - 147/71)  RR: 16 (01-26-21 @ 09:05) (16 - 19)  SpO2: 99% (01-26-21 @ 09:05) (99% - 100%)    Appearance: NAD, laying flat in bed  HEENT:   No JVD  Cardiovascular: Normal S1 S2, No JVD, No murmurs, No edema  Respiratory: Lungs clear to auscultation	  Psychiatry: Awake, alert  Gastrointestinal:  Soft, Non-tender, + BS	  Skin: No rashes, No ecchymoses, No cyanosis	  Neurologic: Non-focal  Extremities: No edema      LABS:	 	                          11.8   6.30  )-----------( 340      ( 26 Jan 2021 06:49 )             37.7     01-26    137  |  101  |  13  ----------------------------<  104<H>  4.3   |  25  |  0.88  01-25    138  |  100  |  13  ----------------------------<  104<H>  3.8   |  26  |  0.87    Ca    10.0      26 Jan 2021 06:49  Ca    9.7      25 Jan 2021 11:18  Mg     2.0     01-26    TPro  7.8  /  Alb  3.9  /  TBili  0.3  /  DBili  x   /  AST  20  /  ALT  9   /  AlkPhos  75  01-26  TPro  7.6  /  Alb  4.3  /  TBili  0.3  /  DBili  x   /  AST  21  /  ALT  11  /  AlkPhos  78  01-25    rd< from: Xray Chest 2 Views PA/Lat (01.25.21 @ 11:47) >    EXAM:  XR CHEST PA LAT 2V        PROCEDURE DATE:  Jan 25 2021         INTERPRETATION:  TIME OF EXAM: January 25, 2021 at 11:36 AM    CLINICAL INFORMATION: Chest pain    TECHNIQUE:   PA and lateral chest    INTERPRETATION:    The lungs are clear. The heart is not enlarged and there are no effusions or pneumothorax.      COMPARISON:  January 23      IMPRESSION:  Normal chest              MORGAN LEE MD; Attending Radiologist  This document has been electronically signed. Jan 25 2021  1:03PM    < end of copied text >  < from: Transthoracic Echocardiogram (01.26.21 @ 16:35) >    Patient name: RAVINDER SIMMONS  YOB: 1950   Age: 70 (F)   MR#: [id number]  Study Date: 1/26/2021  [facility] StressSonographer: Yuly Davis  [facility]  Study quality: Technically good  Referring Physician: Da Ruby MD, PhD  Blood Pressure: 112/58 mmHg  Height: 160 cm  Weight: 77 kg  BSA: 1.8 m2  ------------------------------------------------------------------------  PROCEDURE: Transthoracic echocardiogram with 2-D, M-Mode  and complete spectral and color flow Doppler.  INDICATION: Chest pain, unspecified (R07.9)  ------------------------------------------------------------------------  DIMENSIONS:  Dimensions:     Normal Values:  LA:     2.6 cm    2.0 - 4.0 cm  Ao:     3.0 cm    2.0 - 3.8 cm  SEPTUM: 0.8 cm    0.6 - 1.2 cm  PWT:    0.7 cm    0.6 - 1.1 cm  LVIDd:  4.3 cm    3.0 - 5.6 cm  LVIDs:  2.7 cm    1.8 - 4.0 cm  Derived Variables:  LVMI: 54 g/m2  RWT: 0.32  Fractional short: 37 %  Ejection Fraction (Teicholtz): 67 %  ------------------------------------------------------------------------  OBSERVATIONS:  Mitral Valve: Mitral annular calcification, otherwise  normal mitral valve. Mild mitral regurgitation.  Aortic Root: Normal aortic root.  Aortic Valve: Calcified trileaflet aortic valve with normal  opening.  Left Atrium: Normal left atrium.  Left Ventricle: Normal left ventricular systolic function.  No segmental wall motion abnormalities. Normal left  ventricular internal dimensions and wall thicknesses. Mild  diastolic dysfunction (Stage I).  Right Heart: Normal right atrium. Normal right ventricular  size and function. Normal tricuspid valve.  Mild tricuspid  regurgitation. Normal pulmonic valve. Minimal pulmonic  regurgitation.  Pericardium/PleuraNormal pericardium with no pericardial  effusion.  Hemodynamic: Estimated right ventricular systolic pressure  equals 36 mm Hg, assuming right atrial pressure equals 10  mm Hg, consistent with borderline pulmonary hypertension.  ------------------------------------------------------------------------  CONCLUSIONS:  1. Mitral annular calcification, otherwise normal mitral  valve. Mild mitral regurgitation.  2. Normal left ventricular internal dimensions and wall  thicknesses.  3. Normal left ventricular systolic function. No segmental  wall motion abnormalities.  4. Mild diastolic dysfunction (Stage I).  5. Normal right ventricular size and function.  6. Estimated right ventricular systolic pressure equals 36  mm Hg, assuming right atrial pressure equals 10 mm Hg,  consistent with borderline pulmonary hypertension.  ------------------------------------------------------------------------  Confirmed on  1/26/2021 - 10:49:43 by Isiah Zuniga M.D.,  [facility], [place]  ------------------------------------------------------------------------    < end of copied text >

## 2021-01-27 ENCOUNTER — TRANSCRIPTION ENCOUNTER (OUTPATIENT)
Age: 71
End: 2021-01-27

## 2021-01-27 VITALS
RESPIRATION RATE: 16 BRPM | TEMPERATURE: 98 F | DIASTOLIC BLOOD PRESSURE: 59 MMHG | SYSTOLIC BLOOD PRESSURE: 122 MMHG | HEART RATE: 66 BPM | OXYGEN SATURATION: 100 %

## 2021-01-27 LAB
ANION GAP SERPL CALC-SCNC: 9 MMOL/L — SIGNIFICANT CHANGE UP (ref 7–14)
BUN SERPL-MCNC: 12 MG/DL — SIGNIFICANT CHANGE UP (ref 7–23)
CALCIUM SERPL-MCNC: 9 MG/DL — SIGNIFICANT CHANGE UP (ref 8.4–10.5)
CHLORIDE SERPL-SCNC: 104 MMOL/L — SIGNIFICANT CHANGE UP (ref 98–107)
CO2 SERPL-SCNC: 26 MMOL/L — SIGNIFICANT CHANGE UP (ref 22–31)
CREAT SERPL-MCNC: 0.89 MG/DL — SIGNIFICANT CHANGE UP (ref 0.5–1.3)
GLUCOSE SERPL-MCNC: 97 MG/DL — SIGNIFICANT CHANGE UP (ref 70–99)
HCT VFR BLD CALC: 39.8 % — SIGNIFICANT CHANGE UP (ref 34.5–45)
HGB BLD-MCNC: 12.1 G/DL — SIGNIFICANT CHANGE UP (ref 11.5–15.5)
MAGNESIUM SERPL-MCNC: 2.1 MG/DL — SIGNIFICANT CHANGE UP (ref 1.6–2.6)
MCHC RBC-ENTMCNC: 25.6 PG — LOW (ref 27–34)
MCHC RBC-ENTMCNC: 30.4 GM/DL — LOW (ref 32–36)
MCV RBC AUTO: 84.3 FL — SIGNIFICANT CHANGE UP (ref 80–100)
NRBC # BLD: 0 /100 WBCS — SIGNIFICANT CHANGE UP
NRBC # FLD: 0 K/UL — SIGNIFICANT CHANGE UP
PHOSPHATE SERPL-MCNC: 3.2 MG/DL — SIGNIFICANT CHANGE UP (ref 2.5–4.5)
PLATELET # BLD AUTO: 358 K/UL — SIGNIFICANT CHANGE UP (ref 150–400)
POTASSIUM SERPL-MCNC: 4.8 MMOL/L — SIGNIFICANT CHANGE UP (ref 3.5–5.3)
POTASSIUM SERPL-SCNC: 4.8 MMOL/L — SIGNIFICANT CHANGE UP (ref 3.5–5.3)
RBC # BLD: 4.72 M/UL — SIGNIFICANT CHANGE UP (ref 3.8–5.2)
RBC # FLD: 12.7 % — SIGNIFICANT CHANGE UP (ref 10.3–14.5)
SODIUM SERPL-SCNC: 139 MMOL/L — SIGNIFICANT CHANGE UP (ref 135–145)
WBC # BLD: 5.06 K/UL — SIGNIFICANT CHANGE UP (ref 3.8–10.5)
WBC # FLD AUTO: 5.06 K/UL — SIGNIFICANT CHANGE UP (ref 3.8–10.5)

## 2021-01-27 PROCEDURE — 99239 HOSP IP/OBS DSCHRG MGMT >30: CPT

## 2021-01-27 RX ADMIN — AMLODIPINE BESYLATE 5 MILLIGRAM(S): 2.5 TABLET ORAL at 06:06

## 2021-01-27 RX ADMIN — LOSARTAN POTASSIUM 50 MILLIGRAM(S): 100 TABLET, FILM COATED ORAL at 06:07

## 2021-01-27 RX ADMIN — Medication 50 MILLIGRAM(S): at 06:06

## 2021-01-27 RX ADMIN — ENOXAPARIN SODIUM 40 MILLIGRAM(S): 100 INJECTION SUBCUTANEOUS at 11:40

## 2021-01-27 NOTE — DISCHARGE NOTE PROVIDER - NSDCCPCAREPLAN_GEN_ALL_CORE_FT
PRINCIPAL DISCHARGE DIAGNOSIS  Diagnosis: Chest pain  Assessment and Plan of Treatment: You were evaluated by a cardiologist and underwent a stress test during your stay. Your sress mallory results were normal.  You had an echo performed showing - Normal biventricular systolic function, borderline elevated pulmonary pressures noted on echocardiogram -- will advise periodic surveillance and share with your cardioliogist for further follow up.  Please follow up with your primary care doctor and your cardiologist.      SECONDARY DISCHARGE DIAGNOSES  Diagnosis: Essential hypertension  Assessment and Plan of Treatment: Continue blood pressure medication regimen as directed. Monitor for any visual changes, headaches or dizziness.  Monitor blood pressure regularly.  Follow up with your PCP for further management for high blood pressure.

## 2021-01-27 NOTE — DISCHARGE NOTE NURSING/CASE MANAGEMENT/SOCIAL WORK - PATIENT PORTAL LINK FT
You can access the FollowMyHealth Patient Portal offered by [facility] by registering at the following website: [email or web address]. By joining [facility]’s FollowMyHealth portal, you will also be able to view your health information using other applications (apps) compatible with our system.

## 2021-01-27 NOTE — PROGRESS NOTE ADULT - SUBJECTIVE AND OBJECTIVE BOX
Cardiology/Vascular Medicine Inpatient Progress Note      Vital Signs Last 24 Hrs  T(C): 36.7 (2021 06:04), Max: 36.7 (2021 09:05)  T(F): 98 (2021 06:04), Max: 98.1 (2021 09:05)  HR: 66 (2021 06:04) (66 - 70)  BP: 137/55 (2021 06:04) (112/58 - 137/55)  BP(mean): --  RR: 17 (2021 06:04) (16 - 17)  SpO2: 99% (2021 06:04) (99% - 99%)    I&O's Detail  None recorded    Appearance: NAD, laying flat in bed  HEENT:   No JVD  Cardiovascular: Normal S1 S2, No JVD, No murmurs, No edema  Respiratory: Lungs clear to auscultation	  Psychiatry: Awake, alert  Gastrointestinal:  Soft, Non-tender, + BS	  Skin: No rashes, No ecchymoses, No cyanosis	  Neurologic: Non-focal  Extremities: No edema    MEDICATIONS  (STANDING):  amLODIPine   Tablet 5 milliGRAM(s) Oral daily  atorvastatin 40 milliGRAM(s) Oral at bedtime  enoxaparin Injectable 40 milliGRAM(s) SubCutaneous daily  losartan 50 milliGRAM(s) Oral daily  metoprolol succinate ER 50 milliGRAM(s) Oral daily    MEDICATIONS  (PRN):  acetaminophen   Tablet .. 650 milliGRAM(s) Oral every 6 hours PRN Moderate Pain (4 - 6), Severe Pain (7 - 10)    LABS:	 	                          11.8   6.30  )-----------( 340      ( 2021 06:49 )             37.7       137  |  101  |  13  ----------------------------<  104<H>  4.3   |  25  |  0.88    Ca    10.0      2021 06:49  Mg     2.0         TPro  7.8  /  Alb  3.9  /  TBili  0.3  /  DBili  x   /  AST  20  /  ALT  9   /  AlkPhos  75      PT/INR - ( 2021 11:18 )   PT: 14.4 sec;   INR: 1.28 ratio    PTT - ( 2021 11:18 )  PTT:31.1 sec                      rd< from: Xray Chest 2 Views PA/Lat (21 @ 11:47) >    EXAM:  XR CHEST PA LAT 2V        PROCEDURE DATE:  2021         INTERPRETATION:  TIME OF EXAM: 2021 at 11:36 AM    CLINICAL INFORMATION: Chest pain    TECHNIQUE:   PA and lateral chest    INTERPRETATION:    The lungs are clear. The heart is not enlarged and there are no effusions or pneumothorax.      COMPARISON:        IMPRESSION:  Normal chest              MORGAN LEE MD; Attending Radiologist  This document has been electronically signed. 2021  1:03PM    < end of copied text >  < from: Transthoracic Echocardiogram (21 @ 16:35) >    Patient name: RAVINDER SIMMONS  YOB: 1950   Age: 70 (F)   MR#: [id number]  Study Date: 2021  Location: [facility] StressSonographer: Yuly Davis RDCS  Study quality: Technically good  Referring Physician: Da Ruby MD, PhD  Blood Pressure: 112/58 mmHg  Height: 160 cm  Weight: 77 kg  BSA: 1.8 m2  ------------------------------------------------------------------------  PROCEDURE: Transthoracic echocardiogram with 2-D, M-Mode  and complete spectral and color flow Doppler.  INDICATION: Chest pain, unspecified (R07.9)  ------------------------------------------------------------------------  DIMENSIONS:  Dimensions:     Normal Values:  LA:     2.6 cm    2.0 - 4.0 cm  Ao:     3.0 cm    2.0 - 3.8 cm  SEPTUM: 0.8 cm    0.6 - 1.2 cm  PWT:    0.7 cm    0.6 - 1.1 cm  LVIDd:  4.3 cm    3.0 - 5.6 cm  LVIDs:  2.7 cm    1.8 - 4.0 cm  Derived Variables:  LVMI: 54 g/m2  RWT: 0.32  Fractional short: 37 %  Ejection Fraction (Teicholtz): 67 %  ------------------------------------------------------------------------  OBSERVATIONS:  Mitral Valve: Mitral annular calcification, otherwise  normal mitral valve. Mild mitral regurgitation.  Aortic Root: Normal aortic root.  Aortic Valve: Calcified trileaflet aortic valve with normal  opening.  Left Atrium: Normal left atrium.  Left Ventricle: Normal left ventricular systolic function.  No segmental wall motion abnormalities. Normal left  ventricular internal dimensions and wall thicknesses. Mild  diastolic dysfunction (Stage I).  Right Heart: Normal right atrium. Normal right ventricular  size and function. Normal tricuspid valve.  Mild tricuspid  regurgitation. Normal pulmonic valve. Minimal pulmonic  regurgitation.  Pericardium/PleuraNormal pericardium with no pericardial  effusion.  Hemodynamic: Estimated right ventricular systolic pressure  equals 36 mm Hg, assuming right atrial pressure equals 10  mm Hg, consistent with borderline pulmonary hypertension.  ------------------------------------------------------------------------  CONCLUSIONS:  1. Mitral annular calcification, otherwise normal mitral  valve. Mild mitral regurgitation.  2. Normal left ventricular internal dimensions and wall  thicknesses.  3. Normal left ventricular systolic function. No segmental  wall motion abnormalities.  4. Mild diastolic dysfunction (Stage I).  5. Normal right ventricular size and function.  6. Estimated right ventricular systolic pressure equals 36  mm Hg, assuming right atrial pressure equals 10 mm Hg,  consistent with borderline pulmonary hypertension.  ------------------------------------------------------------------------  Confirmed on  2021 - 10:49:43 by Isiah Zuniga M.D.,  [facility], [place]  ------------------------------------------------------------------------    < end of copied text >    nuc< from: Nuclear Stress Test-Pharmacologic (21 @ 12:10) >    PATIENT: RAVINDER SIMMONS  : 1950   AGE: 70 (F)   MR#: [id number]  STUDY DATE: 2021  LOCATION: [facility] PHYSICIAN(S): Da Ruby MD  FELLOW: JONNA Caal PA  ------------------------------------------------------------------------  TYPE OF TEST: Stress Pharmacologic  INDICATION: Chest pain, unspecified (R07.9)  ------------------------------------------------------------------------  HISTORY:  CARDIAC HISTORY: 70 year old female with hypertension,  CAD, MI, CABG, stent who complains of chest pain  RISK FACTORS: Elevated Cholesterol, Hypertension, Post  Menopausal, Family History  MEDICATIONS: Lovenox, Tylenol, Amlodipine, Atorvastatin,  Losartan, Metoprolol ER  ------------------------------------------------------------------------  BASELINE ELECTROCARDIOGRAM:  Rhythm: Sinus Rhythm - 71 BPM  ST: No significant ST abnormalities.  ------------------------------------------------------------------------  HEMODYNAMIC PARAMETERS:       HR      BP  Baseline  Pre-Injection             71  133/61  00:00     Inject Regadenoson        79  00:30     Post Injection            76  01:00     Post Injection           104  02:00     Post Injection           114  03:00     Post Injection         107  04:00     Post Injection            96  174/76  05:00     Recovery                  93  160/74  06:00     Recovery                  88  07:00     Recovery                  89  156/74  09:08     Recovery                  86  159/68  ------------------------------------------------------------------------  Agent: Regadenoson 0.4 mg/5 ml NS. injected over 10 sec.  HR: Baseline HR: 71 bpm   Peak HR: 114 bpm (77% of MPHR)  MPHR: 149 bpm   85% of MPHR: 127 bpm  BP: Baseline BP: 133/61 mmHg   Peak BP: 174/76 mmHg   Peak  RPP: 36511 (Rate Pressure Product)  Last Caffeine intake: 12 hrs  Terminated: Completion of protocol  ------------------------------------------------------------------------  SYMPTOMS/FINDINGS:  Symptoms: No Symptom  Chest pain: No chest pain with administration of  Regadenoson  Treatment: None  ------------------------------------------------------------------------  ECG ABNORMALITIES DURING/AFTER STRESS:   Abnormalities: None  ------------------------------------------------------------------------  NEW ARRHYTHMIAS DEVELOPED DURING/AFTER STRESS:  None  ------------------------------------------------------------------------  STRESS TEST IMPRESSIONS:  Chest Pain: No chest pain with administration of  Regadenoson.  Symptom: No Symptom.  HR Response: Appropriate.  BP Response: Paradoxical increase.  Heart Rhythm: Sinus Rhythm - 71 BPM.  Baseline ECG: No significant ST abnormalities.  ECG Abnormalities: None.  Arrhythmia: None.  ------------------------------------------------------------------------  PROCEDURE:  7.58 mCi of Tc 99m Tetrofosmin were injected during stress  protocol. Approximately 45 minutes later, tomographic  images were obtained in a 180 degree arc from right  anterior oblique to left anterior oblique with 64 stops.  The tomographic slices were reconstructed in 3 orthogonal  planes (short axis, horizontal long axis and vertical long  axis).  Interpretation was performed both by visual and  quantitative analysis.  Stress images were acquired using CZT-based system with  pinhole collimation ([employer], [employer]),  and reconstructed using MLEM algorithm. Images were  re-acquired with the patient in a prone position.  ------------------------------------------------------------------------  NUCLEAR FINDINGS:  Review of raw data shows: The study is of good technical  quality.  The left ventricle was small in size. Normal myocardial  perfusion scan,with no evidence of infarction or inducible  ischemia.  ------------------------------------------------------------------------  GATED ANALYSIS:  Post-stress gated wall motion analysis was performed (LVEF  > 70%; LVEDV = 44 ml.), revealing normal LV systolic  function.  There was no segmental wall motion abnormality.   RV function appears normal.  ------------------------------------------------------------------------  IMPRESSIONS:Normal Study  * Myocardial Perfusion SPECT results are normal.  * Review of raw data shows: The study is of good technical  quality.  * The left ventricle was small in size. Normal myocardial  perfusion scan,with no evidence of infarction or inducible  ischemia.  * Post-stress gated wall motion analysis was performed  (LVEF > 70%; LVEDV = 44 ml.), revealing normal LV systolic  function.  There was no segmental wall motion abnormality.   RV function appears normal.  ------------------------------------------------------------------------  Confirmed on  2021 - 17:04:36 by Edin Dumont M.D.  ------------------------------------------------------------------------    < end of copied text >

## 2021-01-27 NOTE — DISCHARGE NOTE PROVIDER - HOSPITAL COURSE
Patient is a 71 yo woman with CAD/MI s/p CABG, stent last 2018, HTN presented with cc of CP. Patient reported chest pain started overnight. She felt mid-upper chest pain in the early morning as she wakes up. Patient acknowledged the pleuritic nature of this pain. Patient felt chest pain is radiating to her left side shoulder. Patient reported, as of yesterday, she was able to ambulate one block or climbing one flight of stairs without SANTOS or chest pain. Also patient noted this chest pain is similar to her previous episode felt during her MI in 2018. Patient otherwise denied n/v, f/c, cough, LE swelling, dizziness, palpitations. (25 Jan 2021 17:11)    1. Normal biventricular systolic function, borderline elevated pulmonary pressures noted on echocardiogram -- will advise periodic surveillance.  2. No evidence of ischemia noted on nuclear stress test.  3. Continue current home medications for her history of CAD: ASA, statin, BB, ARB.  She is also on amlodipine for HTN.  4. Patient may be discharged to home today.  Advised to f/u with PMD and her own cardiologist.      Cleared for discharge home as per Dr. Ruby on 1/27 - home

## 2021-01-27 NOTE — DISCHARGE NOTE PROVIDER - NSDCMRMEDTOKEN_GEN_ALL_CORE_FT
AMLODIPINE BESYLATE 5MG TABLET: 1 tab(s) orally once a day  ATORVASTATIN CALCIUM 40MG TABLET: 1 tab(s) orally once a day  LOSARTAN POTASSIUM 50MG TABLET: 1 tab  orally daily   METOPROLOL SUCCINATE ER 50MG TABLET EXTENDED RELEASE 24 HOUR: 1 tab(s) orally once a day

## 2021-01-27 NOTE — PROGRESS NOTE ADULT - ASSESSMENT
Patient is a 69 yo woman with CAD/MI s/p CABG, stent last 2018, HTN presented with cc of CP. Patient reported chest pain started overnight. She felt mid-upper chest pain in the early morning as she wakes up. Patient acknowledged the pleuritic nature of this pain. Patient felt chest pain is radiating to her left side shoulder. Patient reported, as of yesterday, she was able to ambulate one block or climbing one flight of stairs without SANTOS or chest pain. Also patient noted this chest pain is similar to her previous episode felt during her MI in 2018. Patient otherwise denied n/v, f/c, cough, LE swelling, dizziness, palpitations. (25 Jan 2021 17:11)    At the time of my evaluation this AM, the patient appeared hemodynamically and clinically stable. No interval events noted on bedside telemetry.  Physical examination was unremarkable. She appeared euvolemic on exam.    1. Normal biventricular systolic function, borderline elevated pulmonary pressures noted on echocardiogram -- will advise periodic surveillance.  2. No evidence of ischemia noted on nuclear stress test.  3. Continue current home medications for her history of CAD: ASA, statin, BB, ARB.  She is also on amlodipine for HTN.  4. Patient may be discharged to home today.  Advised to f/u with PMD and her own cardiologist.

## 2021-04-02 ENCOUNTER — APPOINTMENT (OUTPATIENT)
Dept: INTERNAL MEDICINE | Facility: CLINIC | Age: 71
End: 2021-04-02
Payer: MEDICARE

## 2021-04-02 ENCOUNTER — NON-APPOINTMENT (OUTPATIENT)
Age: 71
End: 2021-04-02

## 2021-04-02 VITALS
TEMPERATURE: 96.8 F | DIASTOLIC BLOOD PRESSURE: 76 MMHG | BODY MASS INDEX: 31.01 KG/M2 | RESPIRATION RATE: 18 BRPM | HEIGHT: 63 IN | OXYGEN SATURATION: 97 % | HEART RATE: 76 BPM | SYSTOLIC BLOOD PRESSURE: 140 MMHG | WEIGHT: 175 LBS

## 2021-04-02 DIAGNOSIS — R07.9 CHEST PAIN, UNSPECIFIED: ICD-10-CM

## 2021-04-02 DIAGNOSIS — M26.622 ARTHRALGIA OF LEFT TEMPOROMANDIBULAR JOINT: ICD-10-CM

## 2021-04-02 DIAGNOSIS — I25.10 ATHEROSCLEROTIC HEART DISEASE OF NATIVE CORONARY ARTERY W/OUT ANGINA PECTORIS: ICD-10-CM

## 2021-04-02 DIAGNOSIS — I10 ESSENTIAL (PRIMARY) HYPERTENSION: ICD-10-CM

## 2021-04-02 PROCEDURE — 36415 COLL VENOUS BLD VENIPUNCTURE: CPT

## 2021-04-02 PROCEDURE — 93000 ELECTROCARDIOGRAM COMPLETE: CPT

## 2021-04-02 PROCEDURE — 99072 ADDL SUPL MATRL&STAF TM PHE: CPT

## 2021-04-02 PROCEDURE — 99214 OFFICE O/P EST MOD 30 MIN: CPT | Mod: 25

## 2021-04-02 RX ORDER — METOPROLOL TARTRATE 50 MG/1
50 TABLET, FILM COATED ORAL DAILY
Qty: 90 | Refills: 2 | Status: DISCONTINUED | COMMUNITY
End: 2021-04-02

## 2021-04-02 NOTE — DATA REVIEWED
[FreeTextEntry1] : Electrocardiogram reveals a sinus bradycardia at 58 within normal limits.\par blood work results are pending.\par \par Next visit EKG,BP check

## 2021-04-02 NOTE — PHYSICAL EXAM
[Normal Supraclavicular Nodes] : no supraclavicular lymphadenopathy [Normal Anterior Cervical Nodes] : no anterior cervical lymphadenopathy [Normal] : affect was normal and insight and judgment were intact [de-identified] : tender in the leftpreauricular area- TMJ

## 2021-04-02 NOTE — ASSESSMENT
[FreeTextEntry1] : The episode 8 weeks ago may have been anginal and arch and, but there was no recurrence. She understands that she should be emergently reevaluated if she develops the same or similar symptoms again.\par \par she has left TMJ arthralgia. She will return to the dentist for followup. She has new dentures and this is certainly the origin of the problem.\par \par Her blood pressure is somewhat elevated. Metoprolol will be changed from tartrate 2 succinate and the dosage increased from 50-75 mg per day. \par next visit recheck blood pressure plus EKG  Blood work results from today are pending.

## 2021-04-02 NOTE — HISTORY OF PRESENT ILLNESS
[FreeTextEntry1] : Followup for hypertension. Also left facial pain,.also episode of chest pain\par \par 8 weeks ago the patient went to the [facility] emergency room O. on ourwith complaints of substernal chest pain radiating to the left arm.  [de-identified] : The patient was evaluated in the emergency room with an EKG,blood tests, chest x-ray and the patient relates a nuclear study.. She was sent home and told that she was okay. She has been asymptomatic since that time.\par \par

## 2021-04-02 NOTE — REVIEW OF SYSTEMS
[Chest Pain] : chest pain [Negative] : Musculoskeletal [FreeTextEntry4] : See present illness [FreeTextEntry5] : See present illness

## 2021-04-04 LAB
ALBUMIN SERPL ELPH-MCNC: 4.7 G/DL
ALP BLD-CCNC: 78 U/L
ALT SERPL-CCNC: 12 U/L
ANION GAP SERPL CALC-SCNC: 10 MMOL/L
AST SERPL-CCNC: 28 U/L
BASOPHILS # BLD AUTO: 0.04 K/UL
BASOPHILS NFR BLD AUTO: 1 %
BILIRUB SERPL-MCNC: 0.3 MG/DL
BUN SERPL-MCNC: 12 MG/DL
CALCIUM SERPL-MCNC: 10.1 MG/DL
CHLORIDE SERPL-SCNC: 104 MMOL/L
CHOLEST SERPL-MCNC: 168 MG/DL
CO2 SERPL-SCNC: 27 MMOL/L
CREAT SERPL-MCNC: 0.85 MG/DL
EOSINOPHIL # BLD AUTO: 0.15 K/UL
EOSINOPHIL NFR BLD AUTO: 3.8 %
GLUCOSE SERPL-MCNC: 85 MG/DL
HCT VFR BLD CALC: 41.5 %
HDLC SERPL-MCNC: 50 MG/DL
HGB BLD-MCNC: 12.4 G/DL
IMM GRANULOCYTES NFR BLD AUTO: 0 %
LDLC SERPL CALC-MCNC: 100 MG/DL
LYMPHOCYTES # BLD AUTO: 2.08 K/UL
LYMPHOCYTES NFR BLD AUTO: 52.5 %
MAN DIFF?: NORMAL
MCHC RBC-ENTMCNC: 26 PG
MCHC RBC-ENTMCNC: 29.9 GM/DL
MCV RBC AUTO: 87 FL
MONOCYTES # BLD AUTO: 0.33 K/UL
MONOCYTES NFR BLD AUTO: 8.3 %
NEUTROPHILS # BLD AUTO: 1.36 K/UL
NEUTROPHILS NFR BLD AUTO: 34.4 %
NONHDLC SERPL-MCNC: 118 MG/DL
PLATELET # BLD AUTO: 354 K/UL
POTASSIUM SERPL-SCNC: 4.3 MMOL/L
PROT SERPL-MCNC: 8 G/DL
RBC # BLD: 4.77 M/UL
RBC # FLD: 13.6 %
SODIUM SERPL-SCNC: 141 MMOL/L
T3 SERPL-MCNC: 106 NG/DL
T4 SERPL-MCNC: 6.1 UG/DL
TRIGL SERPL-MCNC: 88 MG/DL
TSH SERPL-ACNC: 0.66 UIU/ML
WBC # FLD AUTO: 3.96 K/UL

## 2021-08-05 ENCOUNTER — APPOINTMENT (OUTPATIENT)
Dept: INTERNAL MEDICINE | Facility: CLINIC | Age: 71
End: 2021-08-05

## 2021-10-25 PROBLEM — Z00.00 ENCOUNTER FOR PREVENTIVE HEALTH EXAMINATION: Status: ACTIVE | Noted: 2021-10-25

## 2021-10-26 ENCOUNTER — NON-APPOINTMENT (OUTPATIENT)
Age: 71
End: 2021-10-26

## 2021-10-26 ENCOUNTER — APPOINTMENT (OUTPATIENT)
Dept: SURGICAL ONCOLOGY | Facility: CLINIC | Age: 71
End: 2021-10-26
Payer: MEDICARE

## 2021-10-26 VITALS
RESPIRATION RATE: 17 BRPM | HEART RATE: 80 BPM | BODY MASS INDEX: 29.59 KG/M2 | OXYGEN SATURATION: 96 % | WEIGHT: 167 LBS | SYSTOLIC BLOOD PRESSURE: 142 MMHG | HEIGHT: 63 IN | DIASTOLIC BLOOD PRESSURE: 79 MMHG

## 2021-10-26 DIAGNOSIS — D24.9 BENIGN NEOPLASM OF UNSPECIFIED BREAST: ICD-10-CM

## 2021-10-26 PROCEDURE — 99203 OFFICE O/P NEW LOW 30 MIN: CPT

## 2021-12-08 NOTE — CONSULT LETTER
[Consult Letter:] : I had the pleasure of evaluating your patient, [unfilled]. [Please see my note below.] : Please see my note below. [Consult Closing:] : Thank you very much for allowing me to participate in the care of this patient.  If you have any questions, please do not hesitate to contact me. [Sincerely,] : Sincerely, [( Thank you for referring [unfilled] for consultation for _____ )] : Thank you for referring [unfilled] for consultation for [unfilled] [Dear  ___] : Dear  [unfilled], [FreeTextEntry3] : Jah Lambert MD, FICS, FACS\par , Surgical Oncology \par [facility] \par [address]\[place] [address]\par \[place] [address]\par \par (mob) [phone]\par (o) [phone]\par (f) [phone]\par

## 2021-12-08 NOTE — ASSESSMENT
[FreeTextEntry1] : IMP: 71 year old present with left breast fibroadenoma \par \par PLAN:\par Left breast mammo/sono 4/2022\par RTO 6 months after imaging \par I have discussed the diagnosis, therapeutic plan and options with the patient at length. Patient expressed verbal understanding to proceed with proposed plan. All questions answered.\par \par

## 2021-12-08 NOTE — HISTORY OF PRESENT ILLNESS
[de-identified] : Ms. RAVINDER SIMMONS is a 71 year old female who present today for initial consultation for left breast fibroadenoma. Referred by: Dr. Viviane Padgett \par PMH: HTN, HLD, CAD \par Family history: maternal aunt breast cancer \par \par B/L mammo screening 7/24/21: Nodules are noted b/l. The density of the breast parenchyma limits the secitivity of mammo. SPot compression views of both breast amd b/l breast sono recommended for addition evaluation. BIRADS 0\par \par B/L mammo/sono 8/10/21: No persistent suspicious appearing nodule right breast. Nodularity noted in the left breast particularly in the outer lower quadrant 5 x 4 mm hypoechoic nodule 2:00 left breast 6 cm from the nipple there is a 1.1 x 0.5 cm focal nodule with indistinct margins and some shadowing 5:00 left  breast 7 cm from the nipple. Biopsy recommended. BIRADS 4\par \par Left breast biopsy 8/24/21: Left breast 2:00- Fibroadenoma. 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 \par \par Today 10/26/21: Pt denies any palpable lumps noted in the breast, axillae, and neck bilaterally. \par \par PCP: Dr. Viviane Padgett

## 2021-12-08 NOTE — PHYSICAL EXAM
[Normal] : supple, no neck mass and thyroid not enlarged [Normal Neck Lymph Nodes] : normal neck lymph nodes  [Normal Supraclavicular Lymph Nodes] : normal supraclavicular lymph nodes [Normal Groin Lymph Nodes] : normal groin lymph nodes [Normal Axillary Lymph Nodes] : normal axillary lymph nodes [Normal] : oriented to person, place and time, with appropriate affect [FreeTextEntry1] : COVID-19 precautions as per [facility] policy was universally followed\par  [de-identified] : Complete breast exam performed in supine and upright positions. No palpable masses, tenderness, nipple discharge, inversion, deviation or enlarged axillary or supraclavicular lymph nodes bilaterally.

## 2022-09-14 RX ORDER — METOPROLOL SUCCINATE 50 MG/1
50 TABLET, EXTENDED RELEASE ORAL
Qty: 135 | Refills: 3 | Status: ACTIVE | COMMUNITY
Start: 2021-04-02 | End: 1900-01-01

## 2022-09-14 NOTE — H&P ADULT - HISTORY OF PRESENT ILLNESS
Chief complaint:   Chief Complaint   Patient presents with   • Follow-up     Room 8       Vitals:  Visit Vitals  /64   Pulse 72   Temp 98.8 °F (37.1 °C) (Temporal)   Resp 12   Ht 5' 5\" (1.651 m)   Wt 64.9 kg (143 lb)   LMP 12/06/2021   BMI 23.80 kg/m²       HISTORY OF PRESENT ILLNESS     HPI  Patient presents for follow-up visit.        Hx CVA 3/2020. Neurology Dr Dwyer follows.  on plavix, aspirin, and atorvastatin.   Hx R facial weakness.   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 No evidence of PFO on ALAN.       Hx near-syncope, tachycardia, EP follows, tilt table test was negaitve.   She is on propranolol.   Metoprolol was not tolerated well.     ...    DM, type 1, very well controlled with insulin pump. Endocrinology follows.   Ophthalmology follows annually.   No retinopathy or diabetic neuropathy.     Hx sinus tachycardia, s/p Cardiology evaluation. She takes Toprol XL 50 mg bid.   Due to recurrent tachycardia, Cardiology advised consult with neurologist specializing in autonomic dysfunction.     Hx CP. Cardiology follows. NM stress test in 4/2020 was normal.    Vit D deficiency. She is taking vit D 2000 IU daily. Monitor.     Complex regional pain syndrome, chronic pain and hypersensitivity to touch in R foot, onset after R foot trauma. She is on neurontin 300 mg qid.   Neuro stimulator. Pain mngt specialist follows and manages this.     Insomnia, s/p evaluation with sleep specialist evaluation. She use ambien 5 mg at bedtime, no side effects, she requests refill.     Hx pre-ulcerated calluses on B 4th toes. Podiatry consult requested asap.     Allergic rhinitis. She uses allegra.        Other significant problems:  Patient Active Problem List    Diagnosis Date Noted   • Dizziness 08/12/2022     Priority: Low   • Other chest pain 04/17/2020     Priority: Low   • Stroke (CMS/HCC) 04/17/2020     Priority: Low   • Abnormal echocardiogram      Priority: Low     AValve mass poss Llambls and ALAN with  suspected R Upper Pulmonary Vein Shunt     • Spinal cord stimulator status      Priority: Low   • Carpal tunnel syndrome on right 08/07/2019     Priority: Low   • Complex regional pain syndrome type 1 of right lower extremity 06/19/2019     Priority: Low   • Primary insomnia 11/26/2018     Priority: Low   • Chronic pain in right foot 11/26/2018     Priority: Low   • Tachycardia 09/13/2018     Priority: Low   • Type 1 diabetes mellitus without complication (CMS/[facility]) 08/05/2013     Priority: Low       PAST MEDICAL, FAMILY AND SOCIAL HISTORY     Medications:  Current Outpatient Medications   Medication Sig Dispense Refill   • zolpidem (AMBIEN) 5 MG tablet Take 1 tablet by mouth at bedtime. 90 tablet 1   • Continuous Blood Gluc Sensor (Dexcom G6 Sensor) Misc Insert new sensor every 10 days. 3 each 3   • propRANolol (INDERAL) 20 MG tablet Take 1 tablet by mouth in the morning and 1 tablet in the evening. 60 tablet 0   • fludrocortisone (FLORINEF) 0.1 MG tablet Take 1 tablet by mouth daily. 30 tablet 1   • topiramate (TOPAMAX) 25 MG tablet Take 3 tablets by mouth at bedtime. 270 tablet 3   • rimegepant sulfate (NURTEC ODT) 75 MG disintegrating tablet Place 75 mg under the tongue daily as needed for Migraine. Max 75 mg/24 hours. 8 tablet 11   • acetone, urine, test (Ketostix) strip Use as directed 50 each 1   • Insulin Syringe-Needle U-100 (BD Veo Insulin Syringe U/F) 31G X 15/64\" 0.3 ML Misc Use as directed 50 each 1   • insulin aspart (NovoLOG) 100 UNIT/ML injectable solution TO USE IN INSULIN PUMP. MAX DAILY DOSE OF 80 UNITS. 75 mL 0   • gabapentin (NEURONTIN) 300 MG capsule Take 1 capsule by mouth in the morning and 1 capsule at noon and 1 capsule in the evening and 1 capsule before bedtime. 120 capsule 5   • aspirin 81 MG chewable tablet Chew 1 tablet by mouth daily. 90 tablet 3   • clopidogrel (Plavix) 75 MG tablet Take 1 tablet by mouth daily. 90 tablet 3   • ALPRAZolam (XANAX) 0.25 MG tablet Take 1 tablet by mouth  daily as needed for Anxiety (avoid driving car after taking this medication). (Patient taking differently: Take 0.25 mg by mouth daily as needed for Anxiety (avoid driving car after taking this medication). (when flying in a plane)) 4 tablet 0   • Glucagon (Gvoke HypoPen 2-Pack) 1 MG/0.2ML Solution Auto-injector Inject the contents of 1 pen (0.2mL) into the skin 1 time as needed for severe hypoglycemia 0.4 mL 6   • atorvastatin (LIPITOR) 40 MG tablet Take 1 tablet by mouth daily. 90 tablet 3   • blood glucose test strip Use new test strips each time to check blood sugar 8 times daily. : Meter: Nikkie Contour Next 800 each 3   • nitroGLYcerin (NITROSTAT) 0.4 MG sublingual tablet Place 1 tablet under the tongue every 5 minutes as needed for Chest pain. Do not take more than 3 nitro in 15 minutes. 45 tablet 2   • magnesium 250 MG tablet Take 250 mg by mouth daily.     • Cholecalciferol (VITAMIN D) 50 mcg (2,000 units) capsule Take 1 capsule by mouth 2 times daily. 30 capsule    • glucagon 1 MG injection kit Inject as directed 4 kit 11   • EPINEPHrine 0.3 MG/0.3ML auto-injector use as directed 2 each 0   • acetaminophen (TYLENOL) 500 MG tablet Take 500 mg by mouth every 6 hours as needed for Pain.     • insulin subQ pump      • fexofenadine (ALLEGRA) 180 MG tablet Take 180 mg by mouth daily.         Current Facility-Administered Medications   Medication Dose Route Frequency Provider Last Rate Last Admin   • sodium chloride (NORMAL SALINE) 0.9 % bolus 500 mL  500 mL Intravenous Once Markie Guthrie MD   Completed at 08/27/20 0900       Allergies:  ALLERGIES:   Allergen Reactions   • Sumatriptan Other (See Comments)     Potentially all triptans relatively CI in pt with history of stroke.   • Bee Sting SWELLING   • Codeine RASH     Pt does not remember, as a child       Past Medical  History/Surgeries:  Past Medical History:   Diagnosis Date   • Abnormal echocardiogram     AValve mass poss Llambls and ALAN with suspected R  Upper Pulmonary Vein Shunt   • Cerebral infarction (CMS/[facility]) 2020    some word finding difficulties   • Chronic pain     to foot.  right, not neuropathy, complex regional pain   • Insomnia    • Palpitations    • PONV (postoperative nausea and vomiting)     rash as well, also needs benadryl   • Rectal bleed     possible polyp; resolved now   • Recurrent streptococcal tonsillitis    • Seizures (CMS/[facility])     R/T low blood sugar, multiple, last one 2018   • Spinal cord stimulator status    • Tilt table evaluation 2022   • Type I diabetes mellitus (CMS/[facility])     Continuous Glucose Monitoring device / Insulin Pump   • Wears prescription eyeglasses        Past Surgical History:   Procedure Laterality Date   • Bunionectomy Right 2009    Excision of nonhealed fracture fragment, right first metatarsal phalangeal joint / Simple bunionectomy   • Carpal tunnel release Right 10/25/2019    Revision open carpal tunnel release right & Hypothenar fat flap right hand (Dr. DEONTE Cortez)   • Carpal tunnel release Right 2006    (Dr. IRINEO Man)   • Carpal tunnel release Left 2006    (Dr. IRINEO Man)   •  section, low transverse  2013    Primary low transverse  Section (Dr. BAYRON Alston)   •  section, low transverse  09/15/2015    Repeat low transverse  section with tubal sterilization (Dr. BAYRON Alston)   • Flexible sigmoidoscopy diagnostic include specimens  2011    Normal   • Hernia repair  09/15/2015    Umbilical hernia repair (Dr. JAC Galvez)   • Hernia repair  2019    Recurrent umbilical hernia->Repair with 6.4 cm ventral X patch (Dr. JAC Galvez)   • Holter monitor     • Spinal cord stimulator implant  2010   • Spinal cord stimulator implant  2018    [employer] pulse generator replacement and pocket revision   • Flynn - cv  2020   • Tonsillectomy  2019    Due to Chronic tonsillitis, tonsil hypertrophy (Dr. DEONTE Chaidez)   • Tubal ligation Bilateral  09/15/2015    Repeat low transverse  section with tubal sterilization (Dr. BAYRON Alston)       Family History:  Family History   Problem Relation Age of Onset   • Myocardial Infarction Mother    • Anemia Mother    • Systemic Lupus Erythematosus Mother    • Myocardial Infarction Father    • Macular degeneration Father    • Depression Sister    • Diabetes Brother         Type I   • Depression Sister    • ADHD/ADD Sister    • Alcohol Abuse Sister    • Patient is unaware of any medical problems Sister    • Patient is unaware of any medical problems Sister    • Patient is unaware of any medical problems Sister    • ADHD/ADD Brother    • ADHD/ADD Brother    • Patient is unaware of any medical problems Brother    • Patient is unaware of any medical problems Brother    • Patient is unaware of any medical problems Brother    • Patient is unaware of any medical problems Brother    • Patient is unaware of any medical problems Brother    • Diabetes Maternal Grandmother         Type II   • Anemia Maternal Grandfather    • Aneurysm Maternal Grandfather    • Patient is unaware of any medical problems Paternal Grandmother    • Patient is unaware of any medical problems Paternal Grandfather    • Autism spectrum disorder Son    • Autism spectrum disorder Daughter    • Bilateral breast cancer Neg Hx    • Cancer, Colon Neg Hx        Social History:  Social History     Tobacco Use   • Smoking status: Never Smoker   • Smokeless tobacco: Never Used   Substance Use Topics   • Alcohol use: Yes     Alcohol/week: 1.0 standard drink     Types: 1 Glasses of wine per week     Comment: 2 drinks per month       REVIEW OF SYSTEMS     Review of Systems      PHYSICAL EXAM     Physical Exam  Constitutional:       General: She is not in acute distress.     Appearance: She is well-developed. She is not diaphoretic.   HENT:      Head: Normocephalic and atraumatic.      Neck: Normal range of motion and neck supple.   Eyes:      General: No scleral  icterus.     Conjunctiva/sclera: Conjunctivae normal.      Pupils: Pupils are equal, round, and reactive to light.   Neck:      Thyroid: No thyromegaly.      Vascular: No JVD.   Cardiovascular:      Rate and Rhythm: Normal rate and regular rhythm.      Heart sounds: No murmur heard.    No gallop.   Pulmonary:      Effort: Pulmonary effort is normal. No respiratory distress.      Breath sounds: No wheezing or rales.   Abdominal:      General: There is no distension.      Palpations: Abdomen is soft. There is no mass.      Tenderness: There is no abdominal tenderness. There is no guarding.   Skin:     Coloration: Skin is not pale.      Findings: No rash.   Neurological:      Mental Status: She is alert and oriented to person, place, and time.      Cranial Nerves: No cranial nerve deficit.      Motor: No abnormal muscle tone.      Coordination: Coordination normal.       Recent lab tests:   Sodium (mmol/L)   Date Value   09/01/2022 142      Potassium (mmol/L)   Date Value   09/01/2022 3.9      BUN (mg/dL)   Date Value   09/01/2022 13      Creatinine (mg/dL)   Date Value   09/01/2022 0.88     Hemoglobin A1C (%)   Date Value   09/01/2022 6.7 (H)      Glucose (mg/dL)   Date Value   09/01/2022 147 (H)      Microalbumin/ Creatinine Ratio (mg/g)   Date Value   09/01/2022 12.5     Cholesterol (mg/dL)   Date Value   09/01/2022 178      HDL (mg/dL)   Date Value   09/01/2022 71     Triglycerides (mg/dL)   Date Value   09/01/2022 33      LDL (mg/dL)   Date Value   09/01/2022 100     GPT/ALT (Units/L)   Date Value   09/01/2022 15      GOT/AST (Units/L)   Date Value   09/01/2022 11     HGB (g/dL)   Date Value   09/01/2022 12.5      PLT (K/mcL)   Date Value   09/01/2022 217   ;  TSH (mcUnits/mL)   Date Value   09/01/2022 1.044     Vitamin D, 25-Hydroxy (ng/mL)   Date Value   03/14/2022 39.8         ASSESSMENT/PLAN     Hx CVA 3/2020. Neurology Dr Dwyer follows.  on plavix, aspirin, and atorvastatin.   Hx R facial weakness.   Echo  TTE showed possible Lambl's excrescence. No evidence of PFO on ALAN.     ...    DM, type 1, very well controlled with insulin pump. Endocrinology follows.   Ophthalmology follows annually.   No retinopathy or diabetic neuropathy.     Hx sinus tachycardia, s/p Cardiology evaluation. On propranolol. EP follows.     ...    Vit D deficiency. She is taking vit D 2000 IU daily. Monitor.     Complex regional pain syndrome, chronic pain and hypersensitivity to touch in R foot, onset after R foot trauma. She is on neurontin 300 mg qid.   Neuro stimulator. Pain mngt specialist follows and manages this.     Insomnia, s/p evaluation with sleep specialist evaluation. She use ambien 5 mg qhs.      Patient is a 68 y/o female w/ PMHx of known CAD (hx of PCI), HTN and HLD who presented to cardiologist Dr. Cortes for recurrent exertional chest pain      NST 12/21/2017: EF: 67%, J point depression 0.5 to 1mm in inferolateral leads w/ upsloping ST segment, stress test terminated due to chest pain Confirm medications upon arrival    Patient is a 68 y/o female strong FHX of CAD w/ PMHx of known CAD (hx of PCI), HTN and HLD who presented to cardiologist Dr. Cortes for recurrent exertional chest pain described as a tightness radiating to neck and ear, 8/10 severity associated w/ palpitations and which is relieved with rest and deep breath. In addition pt endorses SANTOS upon walking one city block and is subsequently resolved with 10 minutes of rest. Pt denies syncope, dizziness, PND/orthopnea or LE edema. NST 12/21/2017: EF: 67%, J point depression 0.5 to 1mm in inferolateral leads w/ upsloping ST segment, stress test terminated due to chest pain    In light of pts risk factors, known CAD CCS III anginal symptoms and abnormal stress test pt is referred for cardiac catheterization w/ possible intervention if clinically indicated to r/o progression of CAD Confirm medications upon arrival    Patient is a 66 y/o female strong FHX of CAD w/ PMHx of known CAD (hx of PC @ [facility], 2013 report [place]), HTN and HLD who presented to cardiologist Dr. Cortes for recurrent exertional chest pain described as a tightness radiating to neck and ear, 8/10 severity associated w/ palpitations and which is relieved with rest and deep breath. In addition pt endorses SANTOS upon walking one city block and is subsequently resolved with 10 minutes of rest. Pt denies syncope, dizziness, PND/orthopnea or LE edema. NST 12/21/2017: EF: 67%, J point depression 0.5 to 1mm in inferolateral leads w/ upsloping ST segment, stress test terminated due to chest pain    In light of pts risk factors, known CAD CCS III anginal symptoms and abnormal stress test pt is referred for cardiac catheterization w/ possible intervention if clinically indicated to r/o progression of CAD Confirm medications upon arrival    Patient is a 66 y/o female strong FHX of CAD w/ PMHx of known CAD (hx of PC @ [facility], 2013 report pending), HTN and HLD who presented to cardiologist Dr. Cortes for recurrent exertional chest pain described as a tightness radiating to neck and ear, 8/10 severity associated w/ palpitations and which is relieved with rest and deep breath. In addition pt endorses SANTOS upon walking one city block and is subsequently resolved with 10 minutes of rest. Pt denies syncope, dizziness, PND/orthopnea or LE edema. NST 12/21/2017: EF: 67%, J point depression 0.5 to 1mm in inferolateral leads w/ upsloping ST segment, stress test terminated due to chest pain    In light of pts risk factors, known CAD CCS III anginal symptoms and abnormal stress test pt is referred for cardiac catheterization w/ possible intervention if clinically indicated to r/o progression of CAD Confirm medications upon arrival    Patient is a 66 y/o female strong FHX of CAD w/ PMHx of HTN, Hyperlipidemia, known CAD s/p PCI CHENG mLAD (99%) 7/16/13 at [facility],who presented to cardiologist Dr. Cortes for recurrent exertional chest pain described as a tightness radiating to neck and ear, 8/10 severity associated w/ palpitations and which is relieved with rest and deep breath. In addition pt endorses SANTOS upon walking one city block and is subsequently resolved with 10 minutes of rest. Pt denies syncope, dizziness, PND/orthopnea or LE edema. NST 12/21/2017: EF: 67%, J point depression 0.5 to 1mm in inferolateral leads w/ upsloping ST segment, normal myocardial perfusion however patient experienced chest pain at peak exercise which resolved during recovery.     stress test terminated due to chest pain    In light of pts risk factors, known CAD CCS III anginal symptoms and abnormal stress test pt is referred for cardiac catheterization w/ possible intervention if clinically indicated to r/o progression of CAD    Cardiac Cath at [facility] 7/16/13: LVEF 65%. No AS. No MR. LM: large sized with minor luminal irregularities. CSA IVUS 7.0 mm2. LAD: large sized, proximal 50% IVUS CSA 5.3 mm2, lesion extends to mid LAD with 4.0 mm2 (proximal to 99 % lesion in mid LAD). Promus CHENG mLAD 99% with residual 0% stenosis. LCx: (dominant), large sized, minor luminal irregularities. RCA: minor luminal irregularities. Confirm medications upon arrival    Patient is a 66 y/o female strong FHX of CAD w/ PMHx of HTN, Hyperlipidemia, known CAD s/p PCI CHENG mLAD (99%) 7/16/13 at [facility] who presented to cardiologist Dr. Cortes for recurrent exertional chest pain described as a tightness radiating to neck and ear, 8/10 severity associated w/ palpitations and which is relieved with rest and deep breath. In addition pt endorses SANTOS upon walking one city block and is subsequently resolved with 10 minutes of rest. Pt denies syncope, dizziness, PND/orthopnea or LE edema. NST 12/21/2017: EF: 67%, J point depression 0.5 to 1mm in inferolateral leads w/ upsloping ST segment, normal myocardial perfusion however patient experienced chest pain at peak exercise which resolved during recovery.     stress test terminated due to chest pain    In light of pts risk factors, known CAD CCS III anginal symptoms and abnormal stress test pt is referred for cardiac catheterization w/ possible intervention if clinically indicated to r/o progression of CAD    Cardiac Cath at [facility] 7/16/13: LVEF 65%. No AS. No MR. LM: large sized with minor luminal irregularities. CSA IVUS 7.0 mm2. LAD: large sized, proximal 50% IVUS CSA 5.3 mm2, lesion extends to mid LAD with 4.0 mm2 (proximal to 99 % lesion in mid LAD). Promus CHENG mLAD 99% with residual 0% stenosis. LCx: (dominant), large sized, minor luminal irregularities. RCA: minor luminal irregularities. Patient is a 68 y/o female strong FHX of CAD w/ PMHx of HTN, Hyperlipidemia, known CAD s/p PCI CHENG mLAD (99%) 7/16/13 at [facility] who presented to cardiologist Dr. Cortes for recurrent exertional chest pain described as a tightness radiating to neck and ear, 8/10 severity associated w/ palpitations and which is relieved with rest and deep breath. In addition pt endorses SANTOS upon walking one city block and is subsequently resolved with 10 minutes of rest. Pt denies syncope, dizziness, PND/orthopnea or LE edema. NST 12/21/2017: EF: 67%, J point depression 0.5 to 1mm in inferolateral leads w/ upsloping ST segment, normal myocardial perfusion however patient experienced chest pain at peak exercise which resolved during recovery. Stress test terminated due to chest pain.    In light of pts risk factors, known CAD, CCS III anginal symptoms and abnormal stress test pt is referred for cardiac catheterization w/ possible intervention if clinically indicated to r/o progression of CAD    Cardiac Cath at [facility] 7/16/13: LVEF 65%. No AS. No MR. LM: large sized with minor luminal irregularities. CSA IVUS 7.0 mm2. LAD: large sized, proximal 50% IVUS CSA 5.3 mm2, lesion extends to mid LAD with 4.0 mm2 (proximal to 99 % lesion in mid LAD). Promus CHENG mLAD 99% with residual 0% stenosis. LCx: (dominant), large sized, minor luminal irregularities. RCA: minor luminal irregularities.

## 2024-02-09 NOTE — BRIEF OPERATIVE NOTE - SPECIMENS
Left message for patient to call office back to see if patient has specific questions to discuss with Birgit or what message was in regards to?       none

## 2024-06-15 RX ORDER — METOPROLOL TARTRATE 50 MG
0 TABLET ORAL
Qty: 0 | Refills: 0 | DISCHARGE

## 2024-06-15 RX ORDER — METOPROLOL TARTRATE 50 MG
1 TABLET ORAL
Qty: 0 | Refills: 0 | DISCHARGE

## 2024-06-15 RX ORDER — ATORVASTATIN CALCIUM 80 MG/1
0 TABLET, FILM COATED ORAL
Qty: 0 | Refills: 0 | DISCHARGE

## 2024-06-15 RX ORDER — LOSARTAN POTASSIUM 100 MG/1
1 TABLET, FILM COATED ORAL
Qty: 0 | Refills: 0 | DISCHARGE

## 2024-06-15 RX ORDER — AMLODIPINE BESYLATE 2.5 MG/1
0 TABLET ORAL
Qty: 0 | Refills: 0 | DISCHARGE

## 2024-06-15 RX ORDER — AMLODIPINE BESYLATE 2.5 MG/1
1 TABLET ORAL
Qty: 0 | Refills: 0 | DISCHARGE

## 2024-06-15 RX ORDER — LOSARTAN POTASSIUM 100 MG/1
0 TABLET, FILM COATED ORAL
Qty: 0 | Refills: 0 | DISCHARGE

## 2024-06-15 RX ORDER — ATORVASTATIN CALCIUM 80 MG/1
1 TABLET, FILM COATED ORAL
Qty: 0 | Refills: 0 | DISCHARGE